# Patient Record
Sex: FEMALE | Race: BLACK OR AFRICAN AMERICAN | Employment: OTHER | URBAN - METROPOLITAN AREA
[De-identification: names, ages, dates, MRNs, and addresses within clinical notes are randomized per-mention and may not be internally consistent; named-entity substitution may affect disease eponyms.]

---

## 2021-11-13 ENCOUNTER — HOSPITAL ENCOUNTER (EMERGENCY)
Age: 71
Discharge: HOME | End: 2021-11-13
Payer: COMMERCIAL

## 2021-11-13 VITALS
DIASTOLIC BLOOD PRESSURE: 92 MMHG | WEIGHT: 127 LBS | HEART RATE: 69 BPM | HEIGHT: 61 IN | RESPIRATION RATE: 18 BRPM | OXYGEN SATURATION: 97 % | BODY MASS INDEX: 23.98 KG/M2 | SYSTOLIC BLOOD PRESSURE: 186 MMHG

## 2021-11-13 DIAGNOSIS — S05.8X2A ABRASION OF LEFT EYE, INITIAL ENCOUNTER: ICD-10-CM

## 2021-11-13 DIAGNOSIS — H11.32 CONJUNCTIVAL HEMORRHAGE OF LEFT EYE: Primary | ICD-10-CM

## 2021-11-13 DIAGNOSIS — S05.02XA ABRASION OF LEFT CORNEA, INITIAL ENCOUNTER: ICD-10-CM

## 2021-11-13 PROCEDURE — 99284 EMERGENCY DEPT VISIT MOD MDM: CPT

## 2021-11-13 PROCEDURE — 90471 IMMUNIZATION ADMIN: CPT | Performed by: NURSE PRACTITIONER

## 2021-11-13 PROCEDURE — 6370000000 HC RX 637 (ALT 250 FOR IP): Performed by: NURSE PRACTITIONER

## 2021-11-13 PROCEDURE — 90715 TDAP VACCINE 7 YRS/> IM: CPT | Performed by: NURSE PRACTITIONER

## 2021-11-13 PROCEDURE — 6360000002 HC RX W HCPCS: Performed by: NURSE PRACTITIONER

## 2021-11-13 RX ORDER — AMLODIPINE BESYLATE 10 MG/1
10 TABLET ORAL DAILY
COMMUNITY
Start: 2021-10-01

## 2021-11-13 RX ORDER — ASPIRIN 81 MG/1
81 TABLET ORAL DAILY
COMMUNITY
Start: 2021-10-01

## 2021-11-13 RX ORDER — OMEPRAZOLE 20 MG/1
20 CAPSULE, DELAYED RELEASE ORAL DAILY
COMMUNITY
Start: 2021-10-01

## 2021-11-13 RX ORDER — GENTAMICIN SULFATE 3 MG/ML
2 SOLUTION/ DROPS OPHTHALMIC ONCE
Status: COMPLETED | OUTPATIENT
Start: 2021-11-13 | End: 2021-11-13

## 2021-11-13 RX ORDER — VALSARTAN AND HYDROCHLOROTHIAZIDE 320; 25 MG/1; MG/1
1 TABLET, FILM COATED ORAL DAILY
COMMUNITY
Start: 2021-10-22

## 2021-11-13 RX ORDER — GENTAMICIN SULFATE 3 MG/ML
2 SOLUTION/ DROPS OPHTHALMIC 3 TIMES DAILY
Status: DISCONTINUED | OUTPATIENT
Start: 2021-11-13 | End: 2021-11-13

## 2021-11-13 RX ORDER — ATORVASTATIN CALCIUM 10 MG/1
10 TABLET, FILM COATED ORAL DAILY
COMMUNITY
Start: 2021-10-01

## 2021-11-13 RX ORDER — ATENOLOL 100 MG/1
100 TABLET ORAL DAILY
COMMUNITY
Start: 2021-10-01

## 2021-11-13 RX ADMIN — FLUORESCEIN SODIUM 1 MG: 1 STRIP OPHTHALMIC at 11:21

## 2021-11-13 RX ADMIN — GENTAMICIN SULFATE 2 DROP: 3 SOLUTION OPHTHALMIC at 11:40

## 2021-11-13 RX ADMIN — TETANUS TOXOID, REDUCED DIPHTHERIA TOXOID AND ACELLULAR PERTUSSIS VACCINE, ADSORBED 0.5 ML: 5; 2.5; 8; 8; 2.5 SUSPENSION INTRAMUSCULAR at 11:40

## 2021-11-13 ASSESSMENT — ENCOUNTER SYMPTOMS
BLURRED VISION: 1
EYE WATERING: 0
EYE ITCHING: 0
NAUSEA: 0
EYE DISCHARGE: 0
EYE REDNESS: 1
CRUSTING: 0
EYE INFLAMMATION: 1
DOUBLE VISION: 0
BLIND SPOTS: 0
PHOTOPHOBIA: 0
VOMITING: 0
PERI-ORBITAL EDEMA: 0

## 2021-11-13 ASSESSMENT — PAIN DESCRIPTION - PAIN TYPE: TYPE: ACUTE PAIN

## 2021-11-13 ASSESSMENT — PAIN DESCRIPTION - ORIENTATION: ORIENTATION: LEFT

## 2021-11-13 ASSESSMENT — PAIN DESCRIPTION - LOCATION: LOCATION: EYE

## 2021-11-13 ASSESSMENT — PAIN SCALES - GENERAL: PAINLEVEL_OUTOF10: 5

## 2021-11-13 NOTE — ED NOTES
Visual acuity completed and Radha np updated. Left eye 20/30, right eye 20/30.      Ilya Abbott RN  11/13/21 2547

## 2021-11-13 NOTE — ED PROVIDER NOTES
[address] [facility] ED  eMERGENCY dEPARTMENT eNCOUnter      Pt Name: Geetha Sims  MRN: [id number]  Magalygfirais 1950  Date of evaluation: 11/13/2021  Provider: FEMI Trevino 6626       Chief Complaint   Patient presents with    Eye Problem     contact with metal hook of [employer] decoration immediatly prior to arrival, left eye         HISTORY OF PRESENT ILLNESS   (Location/Symptom, Timing/Onset,Context/Setting, Quality, Duration, Modifying Factors, Severity)  Note limiting factors. Geetha Sims is a 66 y.o. female who presents to the emergency department      The history is provided by the patient. Eye Problem  Location:  Left eye  Quality:  Foreign body sensation  Severity:  Mild  Onset quality:  Sudden  Duration:  1 hour  Timing:  Constant  Progression:  Unchanged  Chronicity:  New  Context: direct trauma and scratch    Context: not burn, not chemical exposure, not contact lens problem, not foreign body, not using machinery, not smoke exposure and not UV exposure    Context comment:  Hiy in eye with garland that had a metal strip in it  Relieved by:  None tried  Worsened by:  Nothing  Ineffective treatments:  None tried  Associated symptoms: blurred vision, inflammation and redness    Associated symptoms: no crusting, no decreased vision, no discharge, no double vision, no facial rash, no headaches, no itching, no nausea, no numbness, no photophobia, no scotomas, no swelling, no tearing, no tingling, no vomiting and no weakness    Risk factors: conjunctival hemorrhage    Risk factors: no exposure to pinkeye, no previous injury to eye, no recent herpes zoster and no recent URI        NursingNotes were reviewed. REVIEW OF SYSTEMS    (2-9 systems for level 4, 10 or more for level 5)     Review of Systems   Eyes: Positive for blurred vision and redness. Negative for double vision, photophobia, discharge and itching. Gastrointestinal: Negative for nausea and vomiting. Neurological: Negative for tingling, weakness, numbness and headaches. Except as noted above the remainder of the review of systems was reviewed and negative. PAST MEDICAL HISTORY     Past Medical History:   Diagnosis Date    Hyperlipidemia     Hypertension          SURGICALHISTORY     History reviewed. No pertinent surgical history. CURRENT MEDICATIONS       Previous Medications    AMLODIPINE (NORVASC) 10 MG TABLET    Take 10 mg by mouth daily    ASPIRIN 81 MG EC TABLET    Take 81 mg by mouth daily    ATENOLOL (TENORMIN) 100 MG TABLET    Take 100 mg by mouth daily    ATORVASTATIN (LIPITOR) 10 MG TABLET    Take 10 mg by mouth daily    CHOLECALCIFEROL 50 MCG (2000 UT) TABS    Take 2,000 Units by mouth daily    OMEPRAZOLE (PRILOSEC) 20 MG DELAYED RELEASE CAPSULE    Take 20 mg by mouth daily    VALSARTAN-HYDROCHLOROTHIAZIDE (DIOVAN-HCT) 320-25 MG PER TABLET    Take 1 tablet by mouth daily       ALLERGIES     Hydralazine, Lisinopril, and Morphine    FAMILY HISTORY     History reviewed. No pertinent family history. SOCIAL HISTORY       Social History     Socioeconomic History    Marital status: Single     Spouse name: None    Number of children: None    Years of education: None    Highest education level: None   Occupational History    None   Tobacco Use    Smoking status: Never Smoker    Smokeless tobacco: Never Used   Substance and Sexual Activity    Alcohol use: Never    Drug use: Never    Sexual activity: None   Other Topics Concern    None   Social History Narrative    None     Social Determinants of Health     Financial Resource Strain:     Difficulty of Paying Living Expenses: Not on file   Food Insecurity:     Worried About Running Out of Food in the Last Year: Not on file    Jovan of Food in the Last Year: Not on file   Transportation Needs:     Lack of Transportation (Medical): Not on file    Lack of Transportation (Non-Medical):  Not on file   Physical Activity: physician with the below findings:        Interpretation per the Radiologist below, if available at the time ofthis note:    No orders to display         ED BEDSIDE ULTRASOUND:   Performed by ED Physician - none    LABS:  Labs Reviewed - No data to display    All other labs were within normal range or not returned as of this dictation. EMERGENCY DEPARTMENT COURSE and DIFFERENTIAL DIAGNOSIS/MDM:   Vitals:    Vitals:    11/13/21 1045   BP: (!) 186/92   Pulse: 69   Resp: 18   SpO2: 97%   Weight: 127 lb (57.6 kg)   Height: 5' 1\" (1.549 m)           MDM    CRITICAL CARE TIME   Total Critical Care time was  minutes, excluding separately reportableprocedures. There was a high probability of clinicallysignificant/life threatening deterioration in the patient's condition which required my urgent intervention. CONSULTS:  None    PROCEDURES:  Unless otherwise noted below, none     Procedures    FINAL IMPRESSION      1. Conjunctival hemorrhage of left eye    2. Abrasion of left cornea, initial encounter    3.  Abrasion of left eye, initial encounter        DISPOSITION/PLAN   DISPOSITION Decision To Discharge 11/13/2021 11:32:02 AM      PATIENT REFERRED TO:  [address]   [phone]  Schedule an appointment as soon as possible for a visit in 1 day  call MOnday morning ask for same day appointment      DISCHARGE MEDICATIONS:  New Prescriptions    No medications on file          (Please note that portions of this note were completed with a voice recognitionprogram.  Efforts were made to edit the dictations but occasionally words are mis-transcribed.)    FEMI Griffith CNP (electronically signed)  Attending Emergency Physician         FEMI Jeter CNP  11/13/21 3631

## 2021-11-13 NOTE — ED TRIAGE NOTES
PT to ed from home via triage with complaints of eye injury  Pt states the was hit in the eye by a elías ornament. Pt reports metal hook end hit left eye. Subconjunctival hemorrhage noted to lateral left eye. No bleeding drainage or discharge noted. Pt reports vision is intact with mild blurring noted.

## 2024-07-05 ENCOUNTER — HOSPITAL ENCOUNTER (OUTPATIENT)
Facility: HOSPITAL | Age: 74
Setting detail: OBSERVATION
Discharge: HOME | End: 2024-07-07
Attending: STUDENT IN AN ORGANIZED HEALTH CARE EDUCATION/TRAINING PROGRAM | Admitting: INTERNAL MEDICINE
Payer: COMMERCIAL

## 2024-07-05 ENCOUNTER — APPOINTMENT (OUTPATIENT)
Dept: RADIOLOGY | Facility: HOSPITAL | Age: 74
End: 2024-07-05
Payer: COMMERCIAL

## 2024-07-05 ENCOUNTER — APPOINTMENT (OUTPATIENT)
Dept: CARDIOLOGY | Facility: HOSPITAL | Age: 74
End: 2024-07-05
Payer: COMMERCIAL

## 2024-07-05 DIAGNOSIS — G45.8 OTHER TRANSIENT CEREBRAL ISCHEMIC ATTACKS AND RELATED SYNDROMES: ICD-10-CM

## 2024-07-05 DIAGNOSIS — R06.02 SHORTNESS OF BREATH: ICD-10-CM

## 2024-07-05 DIAGNOSIS — R51.9 NONINTRACTABLE HEADACHE, UNSPECIFIED CHRONICITY PATTERN, UNSPECIFIED HEADACHE TYPE: Primary | ICD-10-CM

## 2024-07-05 DIAGNOSIS — G45.9 TIA (TRANSIENT ISCHEMIC ATTACK): ICD-10-CM

## 2024-07-05 LAB
ALBUMIN SERPL BCP-MCNC: 4.3 G/DL (ref 3.4–5)
ALP SERPL-CCNC: 74 U/L (ref 33–136)
ALT SERPL W P-5'-P-CCNC: 14 U/L (ref 7–45)
ANION GAP SERPL CALC-SCNC: 13 MMOL/L (ref 10–20)
APTT PPP: 31 SECONDS (ref 27–38)
AST SERPL W P-5'-P-CCNC: 17 U/L (ref 9–39)
BASOPHILS # BLD AUTO: 0.04 X10*3/UL (ref 0–0.1)
BASOPHILS NFR BLD AUTO: 0.7 %
BILIRUB SERPL-MCNC: 0.4 MG/DL (ref 0–1.2)
BNP SERPL-MCNC: 19 PG/ML (ref 0–99)
BUN SERPL-MCNC: 25 MG/DL (ref 6–23)
CALCIUM SERPL-MCNC: 9.4 MG/DL (ref 8.6–10.3)
CARDIAC TROPONIN I PNL SERPL HS: 4 NG/L (ref 0–13)
CARDIAC TROPONIN I PNL SERPL HS: 5 NG/L (ref 0–13)
CHLORIDE SERPL-SCNC: 104 MMOL/L (ref 98–107)
CHOLEST SERPL-MCNC: 159 MG/DL (ref 0–199)
CHOLESTEROL/HDL RATIO: 3.8
CO2 SERPL-SCNC: 26 MMOL/L (ref 21–32)
CREAT SERPL-MCNC: 1.03 MG/DL (ref 0.5–1.05)
EGFRCR SERPLBLD CKD-EPI 2021: 57 ML/MIN/1.73M*2
EOSINOPHIL # BLD AUTO: 0.15 X10*3/UL (ref 0–0.4)
EOSINOPHIL NFR BLD AUTO: 2.6 %
ERYTHROCYTE [DISTWIDTH] IN BLOOD BY AUTOMATED COUNT: 13.2 % (ref 11.5–14.5)
GLUCOSE BLD MANUAL STRIP-MCNC: 136 MG/DL (ref 74–99)
GLUCOSE SERPL-MCNC: 100 MG/DL (ref 74–99)
HCT VFR BLD AUTO: 39.4 % (ref 36–46)
HDLC SERPL-MCNC: 42.1 MG/DL
HGB BLD-MCNC: 12.8 G/DL (ref 12–16)
HOLD SPECIMEN: NORMAL
IMM GRANULOCYTES # BLD AUTO: 0.01 X10*3/UL (ref 0–0.5)
IMM GRANULOCYTES NFR BLD AUTO: 0.2 % (ref 0–0.9)
INR PPP: 1 (ref 0.9–1.1)
LDLC SERPL CALC-MCNC: 85 MG/DL
LYMPHOCYTES # BLD AUTO: 2.71 X10*3/UL (ref 0.8–3)
LYMPHOCYTES NFR BLD AUTO: 47.5 %
MAGNESIUM SERPL-MCNC: 2.41 MG/DL (ref 1.6–2.4)
MCH RBC QN AUTO: 28.5 PG (ref 26–34)
MCHC RBC AUTO-ENTMCNC: 32.5 G/DL (ref 32–36)
MCV RBC AUTO: 88 FL (ref 80–100)
MONOCYTES # BLD AUTO: 0.62 X10*3/UL (ref 0.05–0.8)
MONOCYTES NFR BLD AUTO: 10.9 %
NEUTROPHILS # BLD AUTO: 2.18 X10*3/UL (ref 1.6–5.5)
NEUTROPHILS NFR BLD AUTO: 38.1 %
NON HDL CHOLESTEROL: 117 MG/DL (ref 0–149)
NRBC BLD-RTO: 0 /100 WBCS (ref 0–0)
PLATELET # BLD AUTO: 268 X10*3/UL (ref 150–450)
POTASSIUM SERPL-SCNC: 4.5 MMOL/L (ref 3.5–5.3)
PROT SERPL-MCNC: 7.5 G/DL (ref 6.4–8.2)
PROTHROMBIN TIME: 10.7 SECONDS (ref 9.8–12.8)
RBC # BLD AUTO: 4.49 X10*6/UL (ref 4–5.2)
SODIUM SERPL-SCNC: 138 MMOL/L (ref 136–145)
TRIGL SERPL-MCNC: 158 MG/DL (ref 0–149)
VLDL: 32 MG/DL (ref 0–40)
WBC # BLD AUTO: 5.7 X10*3/UL (ref 4.4–11.3)

## 2024-07-05 PROCEDURE — 36415 COLL VENOUS BLD VENIPUNCTURE: CPT | Performed by: INTERNAL MEDICINE

## 2024-07-05 PROCEDURE — 96361 HYDRATE IV INFUSION ADD-ON: CPT

## 2024-07-05 PROCEDURE — 80061 LIPID PANEL: CPT | Performed by: INTERNAL MEDICINE

## 2024-07-05 PROCEDURE — 85610 PROTHROMBIN TIME: CPT | Performed by: STUDENT IN AN ORGANIZED HEALTH CARE EDUCATION/TRAINING PROGRAM

## 2024-07-05 PROCEDURE — 80053 COMPREHEN METABOLIC PANEL: CPT | Performed by: STUDENT IN AN ORGANIZED HEALTH CARE EDUCATION/TRAINING PROGRAM

## 2024-07-05 PROCEDURE — 70551 MRI BRAIN STEM W/O DYE: CPT

## 2024-07-05 PROCEDURE — 93005 ELECTROCARDIOGRAM TRACING: CPT

## 2024-07-05 PROCEDURE — 71045 X-RAY EXAM CHEST 1 VIEW: CPT

## 2024-07-05 PROCEDURE — 70450 CT HEAD/BRAIN W/O DYE: CPT | Mod: RCN

## 2024-07-05 PROCEDURE — 85730 THROMBOPLASTIN TIME PARTIAL: CPT | Performed by: STUDENT IN AN ORGANIZED HEALTH CARE EDUCATION/TRAINING PROGRAM

## 2024-07-05 PROCEDURE — 85025 COMPLETE CBC W/AUTO DIFF WBC: CPT | Performed by: STUDENT IN AN ORGANIZED HEALTH CARE EDUCATION/TRAINING PROGRAM

## 2024-07-05 PROCEDURE — G0378 HOSPITAL OBSERVATION PER HR: HCPCS

## 2024-07-05 PROCEDURE — 70498 CT ANGIOGRAPHY NECK: CPT | Performed by: RADIOLOGY

## 2024-07-05 PROCEDURE — 97165 OT EVAL LOW COMPLEX 30 MIN: CPT | Mod: GO

## 2024-07-05 PROCEDURE — 96374 THER/PROPH/DIAG INJ IV PUSH: CPT | Mod: 59

## 2024-07-05 PROCEDURE — 83880 ASSAY OF NATRIURETIC PEPTIDE: CPT | Performed by: INTERNAL MEDICINE

## 2024-07-05 PROCEDURE — 70551 MRI BRAIN STEM W/O DYE: CPT | Performed by: RADIOLOGY

## 2024-07-05 PROCEDURE — 70496 CT ANGIOGRAPHY HEAD: CPT | Performed by: RADIOLOGY

## 2024-07-05 PROCEDURE — 83036 HEMOGLOBIN GLYCOSYLATED A1C: CPT | Mod: ELYLAB | Performed by: INTERNAL MEDICINE

## 2024-07-05 PROCEDURE — 2550000001 HC RX 255 CONTRASTS: Performed by: STUDENT IN AN ORGANIZED HEALTH CARE EDUCATION/TRAINING PROGRAM

## 2024-07-05 PROCEDURE — 82947 ASSAY GLUCOSE BLOOD QUANT: CPT

## 2024-07-05 PROCEDURE — 2500000004 HC RX 250 GENERAL PHARMACY W/ HCPCS (ALT 636 FOR OP/ED): Performed by: STUDENT IN AN ORGANIZED HEALTH CARE EDUCATION/TRAINING PROGRAM

## 2024-07-05 PROCEDURE — 2500000001 HC RX 250 WO HCPCS SELF ADMINISTERED DRUGS (ALT 637 FOR MEDICARE OP): Performed by: STUDENT IN AN ORGANIZED HEALTH CARE EDUCATION/TRAINING PROGRAM

## 2024-07-05 PROCEDURE — 84484 ASSAY OF TROPONIN QUANT: CPT | Performed by: STUDENT IN AN ORGANIZED HEALTH CARE EDUCATION/TRAINING PROGRAM

## 2024-07-05 PROCEDURE — 96375 TX/PRO/DX INJ NEW DRUG ADDON: CPT | Mod: 59

## 2024-07-05 PROCEDURE — 71045 X-RAY EXAM CHEST 1 VIEW: CPT | Mod: FOREIGN READ | Performed by: RADIOLOGY

## 2024-07-05 PROCEDURE — 97161 PT EVAL LOW COMPLEX 20 MIN: CPT | Mod: GP | Performed by: PHYSICAL THERAPIST

## 2024-07-05 PROCEDURE — 83735 ASSAY OF MAGNESIUM: CPT | Performed by: STUDENT IN AN ORGANIZED HEALTH CARE EDUCATION/TRAINING PROGRAM

## 2024-07-05 PROCEDURE — 99223 1ST HOSP IP/OBS HIGH 75: CPT | Performed by: INTERNAL MEDICINE

## 2024-07-05 PROCEDURE — 99285 EMERGENCY DEPT VISIT HI MDM: CPT

## 2024-07-05 PROCEDURE — 2500000001 HC RX 250 WO HCPCS SELF ADMINISTERED DRUGS (ALT 637 FOR MEDICARE OP): Performed by: INTERNAL MEDICINE

## 2024-07-05 PROCEDURE — 36415 COLL VENOUS BLD VENIPUNCTURE: CPT | Performed by: STUDENT IN AN ORGANIZED HEALTH CARE EDUCATION/TRAINING PROGRAM

## 2024-07-05 PROCEDURE — 70498 CT ANGIOGRAPHY NECK: CPT

## 2024-07-05 RX ORDER — ASPIRIN 81 MG/1
81 TABLET ORAL DAILY
Status: DISCONTINUED | OUTPATIENT
Start: 2024-07-06 | End: 2024-07-07 | Stop reason: HOSPADM

## 2024-07-05 RX ORDER — DICLOFENAC SODIUM 10 MG/G
4 GEL TOPICAL 4 TIMES DAILY PRN
COMMUNITY

## 2024-07-05 RX ORDER — ASPIRIN 81 MG/1
81 TABLET ORAL DAILY
COMMUNITY

## 2024-07-05 RX ORDER — AMLODIPINE BESYLATE 10 MG/1
10 TABLET ORAL DAILY
COMMUNITY

## 2024-07-05 RX ORDER — CLOPIDOGREL BISULFATE 300 MG/1
300 TABLET, FILM COATED ORAL ONCE
Status: COMPLETED | OUTPATIENT
Start: 2024-07-05 | End: 2024-07-05

## 2024-07-05 RX ORDER — POLYETHYLENE GLYCOL 3350 17 G/17G
17 POWDER, FOR SOLUTION ORAL DAILY
Status: DISCONTINUED | OUTPATIENT
Start: 2024-07-05 | End: 2024-07-07 | Stop reason: HOSPADM

## 2024-07-05 RX ORDER — METOCLOPRAMIDE HYDROCHLORIDE 5 MG/ML
10 INJECTION INTRAMUSCULAR; INTRAVENOUS ONCE
Status: COMPLETED | OUTPATIENT
Start: 2024-07-05 | End: 2024-07-05

## 2024-07-05 RX ORDER — ASPIRIN 325 MG
50000 TABLET, DELAYED RELEASE (ENTERIC COATED) ORAL
COMMUNITY

## 2024-07-05 RX ORDER — CLOPIDOGREL BISULFATE 75 MG/1
75 TABLET ORAL DAILY
Status: DISCONTINUED | OUTPATIENT
Start: 2024-07-06 | End: 2024-07-07 | Stop reason: HOSPADM

## 2024-07-05 RX ORDER — ATORVASTATIN CALCIUM 10 MG/1
10 TABLET, FILM COATED ORAL DAILY
COMMUNITY
End: 2024-07-07 | Stop reason: HOSPADM

## 2024-07-05 RX ORDER — ATENOLOL 100 MG/1
100 TABLET ORAL DAILY
COMMUNITY

## 2024-07-05 RX ORDER — DIPHENHYDRAMINE HYDROCHLORIDE 50 MG/ML
25 INJECTION INTRAMUSCULAR; INTRAVENOUS ONCE
Status: COMPLETED | OUTPATIENT
Start: 2024-07-05 | End: 2024-07-05

## 2024-07-05 RX ORDER — ATORVASTATIN CALCIUM 20 MG/1
40 TABLET, FILM COATED ORAL NIGHTLY
Status: DISCONTINUED | OUTPATIENT
Start: 2024-07-05 | End: 2024-07-07 | Stop reason: HOSPADM

## 2024-07-05 RX ORDER — LABETALOL HYDROCHLORIDE 5 MG/ML
10 INJECTION, SOLUTION INTRAVENOUS EVERY 10 MIN PRN
Status: ACTIVE | OUTPATIENT
Start: 2024-07-05 | End: 2024-07-07

## 2024-07-05 RX ORDER — ASPIRIN 325 MG
325 TABLET ORAL ONCE
Status: COMPLETED | OUTPATIENT
Start: 2024-07-05 | End: 2024-07-05

## 2024-07-05 RX ORDER — LOSARTAN POTASSIUM AND HYDROCHLOROTHIAZIDE 25; 100 MG/1; MG/1
1 TABLET ORAL DAILY
COMMUNITY

## 2024-07-05 SDOH — SOCIAL STABILITY: SOCIAL INSECURITY: HAS ANYONE EVER THREATENED TO HURT YOUR FAMILY OR YOUR PETS?: NO

## 2024-07-05 SDOH — SOCIAL STABILITY: SOCIAL INSECURITY: ABUSE: ADULT

## 2024-07-05 SDOH — SOCIAL STABILITY: SOCIAL INSECURITY: DO YOU FEEL UNSAFE GOING BACK TO THE PLACE WHERE YOU ARE LIVING?: NO

## 2024-07-05 SDOH — SOCIAL STABILITY: SOCIAL INSECURITY: DO YOU FEEL ANYONE HAS EXPLOITED OR TAKEN ADVANTAGE OF YOU FINANCIALLY OR OF YOUR PERSONAL PROPERTY?: NO

## 2024-07-05 SDOH — SOCIAL STABILITY: SOCIAL INSECURITY: HAVE YOU HAD THOUGHTS OF HARMING ANYONE ELSE?: NO

## 2024-07-05 SDOH — SOCIAL STABILITY: SOCIAL INSECURITY: ARE THERE ANY APPARENT SIGNS OF INJURIES/BEHAVIORS THAT COULD BE RELATED TO ABUSE/NEGLECT?: NO

## 2024-07-05 SDOH — SOCIAL STABILITY: SOCIAL INSECURITY: HAVE YOU HAD ANY THOUGHTS OF HARMING ANYONE ELSE?: NO

## 2024-07-05 SDOH — SOCIAL STABILITY: SOCIAL INSECURITY: ARE YOU OR HAVE YOU BEEN THREATENED OR ABUSED PHYSICALLY, EMOTIONALLY, OR SEXUALLY BY ANYONE?: NO

## 2024-07-05 SDOH — SOCIAL STABILITY: SOCIAL INSECURITY: DOES ANYONE TRY TO KEEP YOU FROM HAVING/CONTACTING OTHER FRIENDS OR DOING THINGS OUTSIDE YOUR HOME?: NO

## 2024-07-05 ASSESSMENT — COGNITIVE AND FUNCTIONAL STATUS - GENERAL
DAILY ACTIVITIY SCORE: 24
DAILY ACTIVITIY SCORE: 24
MOBILITY SCORE: 24
PATIENT BASELINE BEDBOUND: NO
MOBILITY SCORE: 24

## 2024-07-05 ASSESSMENT — ENCOUNTER SYMPTOMS
CONSTITUTIONAL NEGATIVE: 1
ENDOCRINE NEGATIVE: 1
RESPIRATORY NEGATIVE: 1
MUSCULOSKELETAL NEGATIVE: 1
EYES NEGATIVE: 1
CARDIOVASCULAR NEGATIVE: 1
GASTROINTESTINAL NEGATIVE: 1
NEUROLOGICAL NEGATIVE: 1

## 2024-07-05 ASSESSMENT — LIFESTYLE VARIABLES
HOW MANY STANDARD DRINKS CONTAINING ALCOHOL DO YOU HAVE ON A TYPICAL DAY: PATIENT DOES NOT DRINK
HOW OFTEN DO YOU HAVE A DRINK CONTAINING ALCOHOL: NEVER
AUDIT-C TOTAL SCORE: 0
TOTAL SCORE: 0
SKIP TO QUESTIONS 9-10: 1
EVER FELT BAD OR GUILTY ABOUT YOUR DRINKING: NO
AUDIT-C TOTAL SCORE: 0
HAVE PEOPLE ANNOYED YOU BY CRITICIZING YOUR DRINKING: NO
HOW OFTEN DO YOU HAVE 6 OR MORE DRINKS ON ONE OCCASION: NEVER
EVER HAD A DRINK FIRST THING IN THE MORNING TO STEADY YOUR NERVES TO GET RID OF A HANGOVER: NO
HAVE YOU EVER FELT YOU SHOULD CUT DOWN ON YOUR DRINKING: NO

## 2024-07-05 ASSESSMENT — COLUMBIA-SUICIDE SEVERITY RATING SCALE - C-SSRS
6. HAVE YOU EVER DONE ANYTHING, STARTED TO DO ANYTHING, OR PREPARED TO DO ANYTHING TO END YOUR LIFE?: NO
1. IN THE PAST MONTH, HAVE YOU WISHED YOU WERE DEAD OR WISHED YOU COULD GO TO SLEEP AND NOT WAKE UP?: NO
2. HAVE YOU ACTUALLY HAD ANY THOUGHTS OF KILLING YOURSELF?: NO

## 2024-07-05 ASSESSMENT — PAIN SCALES - GENERAL
PAINLEVEL_OUTOF10: 0 - NO PAIN
PAINLEVEL_OUTOF10: 0 - NO PAIN
PAINLEVEL_OUTOF10: 8
PAINLEVEL_OUTOF10: 0 - NO PAIN
PAINLEVEL_OUTOF10: 4

## 2024-07-05 ASSESSMENT — PATIENT HEALTH QUESTIONNAIRE - PHQ9
2. FEELING DOWN, DEPRESSED OR HOPELESS: NOT AT ALL
1. LITTLE INTEREST OR PLEASURE IN DOING THINGS: NOT AT ALL
SUM OF ALL RESPONSES TO PHQ9 QUESTIONS 1 & 2: 0

## 2024-07-05 ASSESSMENT — PAIN DESCRIPTION - ORIENTATION: ORIENTATION: RIGHT

## 2024-07-05 ASSESSMENT — ACTIVITIES OF DAILY LIVING (ADL)
WALKS IN HOME: INDEPENDENT
FEEDING YOURSELF: INDEPENDENT
TOILETING: INDEPENDENT
BATHING_ASSISTANCE: INDEPENDENT
DRESSING YOURSELF: INDEPENDENT
ADEQUATE_TO_COMPLETE_ADL: YES
JUDGMENT_ADEQUATE_SAFELY_COMPLETE_DAILY_ACTIVITIES: YES
GROOMING: INDEPENDENT
BATHING: INDEPENDENT
PATIENT'S MEMORY ADEQUATE TO SAFELY COMPLETE DAILY ACTIVITIES?: YES
LACK_OF_TRANSPORTATION: NO
HEARING - RIGHT EAR: FUNCTIONAL
HEARING - LEFT EAR: FUNCTIONAL

## 2024-07-05 ASSESSMENT — PAIN - FUNCTIONAL ASSESSMENT
PAIN_FUNCTIONAL_ASSESSMENT: 0-10

## 2024-07-05 ASSESSMENT — PAIN DESCRIPTION - LOCATION: LOCATION: HEAD

## 2024-07-05 ASSESSMENT — PAIN DESCRIPTION - PAIN TYPE: TYPE: ACUTE PAIN

## 2024-07-05 ASSESSMENT — PAIN DESCRIPTION - DESCRIPTORS: DESCRIPTORS: ACHING

## 2024-07-05 NOTE — H&P
History Of Present Illness  Moon Martinez is a 74 y.o. female with a significant past medical history of hypertension hyperlipidemia came to the emergency room complaining of facial droop, word finding difficulty slurred speech and right upper extremity weakness and numbness, by the time patient reached the emergency room symptoms resolved, denies any chest pain shortness of breath or palpitations, denies any abdominal pain nausea or vomitings, denies any weakness.  Concern for TIA, CT head ruled out intracranial bleed, CTA head and neck no evidence of significant arterial occlusion except moderate stenosis involving the right MCA distal M1 segment.  ER physician contacted both neurology and stroke team at [facility].  No intervention was recommended, continue medical treatment with the dual antiplatelet therapy and admitting patient for further stroke workup.     Past Medical History  She has a past medical history of Personal history of other diseases of the circulatory system and Personal history of other endocrine, nutritional and metabolic disease.  Hypertension.  Hyperlipidemia  Surgical History  She has a past surgical history that includes Lithotripsy (03/24/2015); Other surgical history (03/24/2015); and Other surgical history (03/24/2015).     Social History  She has no history on file for tobacco use, alcohol use, and drug use.    Family History  No family history on file.  Father has history of hypertension  Allergies  Hydralazine, Hydrochlorothiazide, Lisinopril, Milk, and Morphine    Review of Systems   Constitutional: Negative.    HENT: Negative.     Eyes: Negative.    Respiratory: Negative.     Cardiovascular: Negative.    Gastrointestinal: Negative.    Endocrine: Negative.    Genitourinary: Negative.    Musculoskeletal: Negative.    Skin: Negative.    Neurological: Negative.       Review of Systems   Constitutional: Negative.   HENT: Negative.     Eyes: Negative.    Cardiovascular:  Negative.    Respiratory: Negative.     Endocrine: Negative.    Skin: Negative.    Musculoskeletal: Negative.    Gastrointestinal: Negative.    Genitourinary: Negative.    Neurological: Negative.       Physical Exam  General: No distress  Neck: Supple.  HEENT: Normocephalic atraumatic pupils equal react light  Heart: S1-S2 heard no murmurs gallops regurgitation  Lungs: Clear to auscultation bilaterally no wheezing.  Abdomen: Nondistended nontender bowel sounds are present  Neuro: No focal deficits  Last Recorded Vitals  /71   Pulse 70   Temp 36.6 °C (97.9 °F)   Resp 16   Wt 57.6 kg (127 lb)   SpO2 94%     Relevant Results  CT angio head and neck w and wo IV contrast    Result Date: 7/5/2024  Interpreted By:  Jimenez Martinez, STUDY: CT ANGIO HEAD AND NECK W AND WO IV CONTRAST performed 7/5/2024   INDICATION: Signs/Symptoms:HA, c/f dissection   COMPARISON: CT head dated 07/05/2024.   ACCESSION NUMBER(S): [id number]   ORDERING CLINICIAN: MATHEW CULLEN   TECHNIQUE: Axial CTA imaging was obtained through the head and neck following the administration of 75 ML Omnipaque 350 intravenous contrast. Coronal, sagittal, MIP, and 3D reconstructions were obtained. 3D reconstructions were rendered using a separate 3D workstation.   Motion artifact degrades assessment.   FINDINGS: NECK:   No suspicious lymphadenopathy. Patent airway. Salivary glands are unremarkable. Multinodular thyroid gland with dominant 2.0 cm nodule involving the inferior aspect of the left lobe of the thyroid gland. No definite acute osseous abnormality of the cervical spine. Moderate to advanced cervical spondylosis. Mild biapical pleuroparenchymal scarring.   VASCULAR FINDINGS:   Aorta and subclavian arteries:Brachiocephalic and left common carotid artery share a common origin, an anatomic variant. Subclavian arteries are patent without flow significant stenosis.   Common carotid arteries: Allowing for motion artifact, patent bilaterally without  focal stenosis.   External carotid arteries: Patent bilaterally.   Internal carotid arteries: Patent bilaterally. There is small calcified atherosclerotic plaque of the left carotid bifurcation/left internal carotid artery origin. There is no NASCET stenosis bilaterally. There is also trace atherosclerotic involvement of the parasellar intracranial internal carotid artery segments bilaterally without stenosis or definite aneurysm.   Anterior cerebral arteries: Right A1 segment is hypoplastic versus absent. Otherwise expected enhancement of the ACAs bilaterally.   Middle cerebral arteries: Patent appearing bilaterally. There is moderate stenosis involving the right MCA distal M1 and extending to the M2 segment origins (series 401, image 513 and series 404, image 84, for example). The left MCA appears patent without proximal stenosis.   Vertebral arteries: Patent flow signal bilaterally. Mild tortuosity of the V1 segments bilaterally. Otherwise normal enhancement.   Basilar artery: Normal.   Posterior communicating arteries: Visualized on the left, not well visualized on the right.   Posterior cerebral arteries: Near fetal origin of the left PCA. Normal bilaterally.       Motion artifact mildly degrades assessment. No evidence of source vessel arterial occlusion within the head and neck.   There is concern for moderate stenosis involving the right MCA distal M1 segment extending to the proximal M2 branches. Suspected hypoplastic versus absent right A1 segment. Otherwise no additional flow significant stenosis within the head and neck identified.   No evidence of dissection. No NASCET stenosis of the internal carotid artery origins.   2.0 cm diameter nodule suggested within the inferior aspect of the left lobe of the thyroid gland; recommend nonemergent ultrasound for further characterization.   MACRO: Incidental Finding:  There are few small hypoattenuating nodules measuring equal to or greater than 1.5 cm in the  thyroid gland. (**-YCF-**)   Instructions:  Further evaluation with nonemergent thyroid ultrasound. (Managing Incidental Thyroid Nodules Detected on Imaging: White Paper of the ACR Incidental Thyroid Findings Committee. Ora Haines et al. Journal of the American College of Radiology,Volume 12, Issue 2, 143  150.) [email or web address]   Signed by: Jimenez Martinez 7/5/2024 10:04 AM Dictation workstation:   [id number]    ECG 12 lead    Result Date: 7/5/2024  Normal sinus rhythm Possible Left atrial enlargement Left ventricular hypertrophy Cannot rule out Septal infarct (cited on or before 05-JUL-2024) ST & Marked T wave abnormality, consider lateral ischemia Abnormal ECG When compared with ECG of 05-JUL-2024 07:51, (unconfirmed) T wave inversion no longer evident in Inferior leads See ED provider note for full interpretation and clinical correlation    XR chest 1 view    Result Date: 7/5/2024  STUDY: Chest Radiograph;  7/5/2024 7:18AM INDICATION: Chest pain. COMPARISON: None Available. ACCESSION NUMBER(S): [id number] ORDERING CLINICIAN: MATHEW CULLEN TECHNIQUE:  Frontal chest was obtained at 8:18 hours. FINDINGS: CARDIOMEDIASTINAL SILHOUETTE: Cardiomediastinal silhouette is normal in size and configuration.  LUNGS: Lungs are clear.  ABDOMEN: No remarkable upper abdominal findings.  BONES: No acute osseous changes.    No radiographic evidence of acute cardiopulmonary disease. Signed by Raphael Nelson MD    CT head wo IV contrast    Result Date: 7/5/2024  Interpreted By:  Nicole Kapoor, STUDY: CT HEAD WO IV CONTRAST;  7/5/2024 7:48 am   INDICATION: Signs/Symptoms:R side headache, transient slurred speech.   COMPARISON: 01/20/2015   ACCESSION NUMBER(S): [id number]   ORDERING CLINICIAN: MATHEW CULLEN   TECHNIQUE: Noncontrast axial CT scan of head was performed. Angled reformats in brain and bone windows and coronal and sagittal reformats in brain window were generated.   FINDINGS: CSF Spaces: The ventricles, sulci  and basal cisterns are within normal limits. There is no extraaxial fluid collection.   Parenchyma:  The grey-white differentiation is intact. There is no mass effect or midline shift.  There is no intracranial hemorrhage.   Calvarium: The calvarium is unremarkable.   Paranasal sinuses and mastoids: Mild mucoperiosteal thickening is noted in ethmoid air cells. Paranasal sinuses and mastoid air cells are otherwise clear.       No evidence of acute cortical infarct or intracranial hemorrhage.   No evidence of intracranial hemorrhage or displaced skull fracture.   MACRO: None.   Signed by: Nicole Kapoor 7/5/2024 8:20 AM Dictation workstation:   [id number]    ECG 12 lead    Result Date: 7/5/2024  Normal sinus rhythm Possible Left atrial enlargement Left ventricular hypertrophy Cannot rule out Septal infarct (cited on or before 05-JUL-2024) ST & Marked T wave abnormality, consider inferolateral ischemia Abnormal ECG When compared with ECG of 15-JUL-2022 13:25, Previous ECG has undetermined rhythm, needs review Serial changes of Septal infarct Present See ED provider note for full interpretation and clinical correlation   Scheduled medications  [START ON 7/6/2024] aspirin, 81 mg, oral, Daily  aspirin, 325 mg, oral, Once  atorvastatin, 40 mg, oral, Nightly  clopidogrel, 300 mg, oral, Once  [START ON 7/6/2024] clopidogrel, 75 mg, oral, Daily  oxygen, , inhalation, Continuous - 02/gases  perflutren lipid microspheres, 0.5-10 mL of dilution, intravenous, Once in imaging  perflutren protein A microsphere, 0.5 mL, intravenous, Once in imaging  polyethylene glycol, 17 g, oral, Daily  sulfur hexafluoride microsphr, 2 mL, intravenous, Once in imaging      Continuous medications     PRN medications  PRN medications: labetaloL       Assessment/Plan   TIA.  Hypertension  T wave changes on the EKG.  Narrowing of right MCA.    History of:  Hypertension  Hyperlipidemia.    Plan:  Patient initially presented to the emergency room  with right upper extremity weakness, word finding difficulty and slurred speech-symptoms resolved by the time patient reached the emergency room.  Concern for TIA, initial workup is unremarkable except mild to moderate stenosis of right MCA.  ER physician contacted both neurology and stroke neurology service at [facility], discussed with Dr. Lonnie Hanson-recommended admitted locally for TIA workup.  As the patient's ABCD2 score is 4-starting patient on dual antiplatelet therapy.  With aspirin and Plavix, statins nightly.  MRI brain ordered.  Echocardiogram.  Neurochecks.  Gentle hydration.  EKG results reviewed, showing a significant T wave depressions in the chest leads, consulted cardiology, cycling troponins.  Lipid panel for tomorrow.  Blood pressure initially was high in 170s improved to 140s on its own, stable.  Currently n.p.o., speech evaluation, after clearance okay for regular diet.    DVT prophylaxis:  Lovenox daily.    Disposition:  Stroke workup and neurology evaluation pending.  Possible discharge in next 1 to 2 days.    Wilfred Henderson MD

## 2024-07-05 NOTE — PROGRESS NOTES
Physical Therapy    Physical Therapy Evaluation    Patient Name: Moon Martinez  MRN: [id number]  Today's Date: 7/5/2024   Time Calculation  Start Time: 1501  Stop Time: 1513  Time Calculation (min): 12 min  909/909-B    Assessment/Plan   PT Assessment  Rehab Prognosis: Good  Evaluation/Treatment Tolerance: Patient tolerated treatment well  Medical Staff Made Aware: Yes  Strengths: Ability to acquire knowledge, Access to adaptive/assistive products, Attitude of self, Capable of completing ADLs semi/independent, Coping skills, Insight into problems, Housing layout, Living arrangement secure, Premorbid level of function  End of Session Communication: Bedside nurse  End of Session Patient Position: Bed, 2 rail up, Alarm off, not on at start of session (Call light within reach)  IP OR SWING BED PT PLAN  Inpatient or Swing Bed: Inpatient  PT Plan  PT Plan: PT Eval only  PT Eval Only Reason: No acute PT needs identified  PT Frequency: PT eval only  PT Discharge Recommendations: No further acute PT  PT Recommended Transfer Status: Independent  PPhysical Therapy eval completed per MD requisition. P.T. recommendations as outlined above. Recommend D/C from acute care when medically appropriate as deemed by medical staff.    Subjective       General Visit Information:  General  Reason for Referral: impaired mobility  Referred By: Dr. Chavez (PT/OT 7/5)  Past Medical History Relevant to Rehab: includes: HTN, HLD  Family/Caregiver Present: Yes (Friend present and supportive)  Prior to Session Communication: Bedside nurse  Patient Position Received: Bed, 2 rail up, Alarm off, not on at start of session  Preferred Learning Style: auditory, verbal  General Comment: Pt. is a 73yo whp presented to [facility] ED on 7/5/2024 with c/o headache, R facial droop with drooling, R arm weakness/numbness. on arrival to ED, symptoms have resolved per EMR. admitted for TIA workup.   CT head (-), CXR (-),   CT angio head: Moderate stenosis R MCA    Home  Living:  Home Living  Home Living Comments: Pt. lives alone in a 2nd floor apt with elevator or 12 steps with HR to access.  Tub shower with a grab bar but no seat. Laundry same floor.    Prior Level of Function:  Prior Function Per Pt/Caregiver Report  Prior Function Comments: Pt. amb without AD PTA and was I with I with ADLs and IADLs PTA. Pt. denied falls in last 3 months. Pt. drove PTA.    Precautions:  Precautions  Medical Precautions:  (Activity order: Bathroom privileges with A; aspiration precautions)  Precautions Comment: Per EMR: Low fall risk         Objective     Pain:  Pain Assessment  Pain Assessment: 0-10  0-10 (Numeric) Pain Score: 0 - No pain    Cognition:  Cognition  Overall Cognitive Status: Within Functional Limits  Orientation Level: Oriented X4    General Assessments:  General Observation  General Observation: Tele   Activity Tolerance  Endurance: Endurance does not limit participation in activity                 Dynamic Sitting Balance  Dynamic Sitting-Comments: Good static and dynamic sitting balance  Dynamic Standing Balance  Dynamic Standing-Comments: Goodstatic and dynamic standing balance    Functional Assessments:     Bed Mobility  Bed Mobility: Yes  Bed Mobility 1  Bed Mobility 1: Supine to sitting, Sitting to supine  Level of Assistance 1: Independent  Transfers  Transfer: Yes  Transfer 1  Technique 1: Sit to stand, Stand to sit  Transfer Device 1:  (No AD)  Transfer Level of Assistance 1: Independent  Ambulation/Gait Training  Ambulation/Gait Training Performed: Yes  Ambulation/Gait Training 1  Surface 1: Level tile  Device 1: No device  Assistance 1: Independent  Quality of Gait 1:  (Steady, reciprocal gait with no gait deviations)  Comments/Distance (ft) 1: 200'  Stairs  Stairs: No       Extremity/Trunk Assessments:        RLE   RLE : Within Functional Limits  LLE   LLE : Within Functional Limits    Outcome Measures:     [facility] Basic Mobility  Turning from your back to your side  while in a flat bed without using bedrails: None  Moving from lying on your back to sitting on the side of a flat bed without using bedrails: None  Moving to and from bed to chair (including a wheelchair): None  Standing up from a chair using your arms (e.g. wheelchair or bedside chair): None  To walk in hospital room: None  Climbing 3-5 steps with railing: None  Basic Mobility - Total Score: 24                                            Education Documentation  Mobility Training, taught by [place] PT at 7/5/2024  3:22 PM.  Learner: Patient  Readiness: Acceptance  Method: Explanation  Response: Verbalizes Understanding  Comment: Role of PT

## 2024-07-05 NOTE — PROGRESS NOTES
Occupational Therapy    Evaluation    Patient Name: Moon Martinez  MRN: [id number]  Today's Date: 7/5/2024  Time Calculation  Start Time: 1500  Stop Time: 1512  Time Calculation (min): 12 min        Assessment:  OT Assessment: Independent ADLs/transfers/mobility  Prognosis: Good  Barriers to Discharge: None  Evaluation/Treatment Tolerance: Patient tolerated treatment well  End of Session Communication: Bedside nurse  End of Session Patient Position: Bed, 2 rail up, Alarm off, not on at start of session  Prognosis: Good  Barriers to Discharge: None  Evaluation/Treatment Tolerance: Patient tolerated treatment well  Plan:  No Skilled OT: No acute OT goals identified  OT Frequency: OT eval only  OT Discharge Recommendations: No further acute OT, No OT needed after discharge  OT - OK to Discharge: Yes (when medically stable/cleared)       Subjective   Current Problem:  1. Nonintractable headache, unspecified chronicity pattern, unspecified headache type        2. TIA (transient ischemic attack)  Transthoracic Echo (TTE) Complete    Transthoracic Echo (TTE) Complete        General:  General  Reason for Referral: ADL impairment  Referred By: Scott 7/5, OT/PT  Past Medical History Relevant to Rehab: HTN, HLD  Family/Caregiver Present: Yes  Caregiver Feedback: friend  Prior to Session Communication: Bedside nurse  Patient Position Received: Bed, 2 rail up, Alarm off, not on at start of session  General Comment: Pt. is 73 y/o female to ED with c/o headache, R facial droop with drooling, R arm weakness/numbness. on arrival to ED, symptoms have resolved per EMR. admitted for TIA workup. CT head (-), CXR (-), CT angio head: Moderate stenosis R MCA  Precautions:  Precautions Comment: no known precautions    Pain:  Pain Assessment  Pain Assessment: 0-10  0-10 (Numeric) Pain Score: 0 - No pain  Pain Type:  (reports have chronic shoulder pain an dback pain, but no pain at this time.)    Objective   Cognition:  Overall Cognitive  Status: Within Functional Limits  Orientation Level: Oriented X4     Home Living:  Home Living Comments: Pt. lives on 2nd floor apartment; has elevator access; able to do stairs. tub shower, no seat or grab bars. Laundry on 2nd floor  Prior Function:  Prior Function Comments: Independent with ADLs/IADLs. No AD. Drives. Denies falls.    ADL:  Eating Assistance: Independent  Grooming Assistance: Independent  Bathing Assistance: Independent  UE Dressing Assistance: Independent  LE Dressing Assistance: Independent  Toileting Assistance with Device: Independent  Activity Tolerance:  Endurance: Endurance does not limit participation in activity  Bed Mobility/Transfers: Bed Mobility  Bed Mobility:  (Independent sup to sit, sit to sup, and boosting herself in bed.)    Transfers  Transfer:  (sit to stand, stand to sit; No AD. Independent)    Functional Mobility:  Functional Mobility  Functional Mobility Performed:  (>100 feet. No Ad. Independent)    Standing Balance:  Dynamic Standing Balance  Dynamic Standing-Comments: Good     Sensation:  Sensation Comment: denies numbness/tinging  Strength:  Strength Comments: Eulalio 5/5 MMT    Coordination:  Coordination Comment: WNL   Hand Function:  Gross Grasp: Functional  Extremities: RUE   RUE : Within Functional Limits and LUE   LUE: Within Functional Limits    Outcome Measures:[facility] Daily Activity  Putting on and taking off regular lower body clothing: None  Bathing (including washing, rinsing, drying): None  Putting on and taking off regular upper body clothing: None  Toileting, which includes using toilet, bedpan or urinal: None  Taking care of personal grooming such as brushing teeth: None  Eating Meals: None  Daily Activity - Total Score: 24      Education Documentation  ADL Training, taught by Wendy Yao OT at 7/5/2024  3:18 PM.  Learner: Patient  Readiness: Acceptance  Method: Explanation  Response: Verbalizes Understanding, Demonstrated Understanding

## 2024-07-05 NOTE — ED PROVIDER NOTES
HPI: The patient is a 74-year-old woman with history of hypertension who is on a baby aspirin who presents to the Emergency Department with a chief complaint of headache.  Patient reports that when she woke this morning she was in her normal state of health at roughly 5:30 AM.  She was watching TV and noticed that she developed a severe right-sided headache and noticed that she was drooling out of the right side of her mouth.  She felt some pain in her right arm as well as some numbness and felt like her speech was off.  She then presented to the ER for further evaluation and care.  She reports that at this time, the headache is still present but she no longer has the drooling or the speech difficulty or the numbness.  She reports has not had symptoms like this before and takes baby aspirin but no other blood thinners.  No trauma.  She reports that when she went to bed last night when she awoke this morning she was in her normal state of health.  Patient denies any new vision change or jaw claudication.     PAST MEDICAL HISTORY:  as per HPI  ALLERGIES:  as per HPI  MEDICATIONS:  as per HPI  FAMILY HISTORY: as per HPI  SURGICAL HISTORY: as per HPI  SOCIAL HISTORY: as per HPI     PHYSICAL EXAM:  VITAL SIGNS: Nursing notes reviewed.  GENERAL:  Alert and interactive  EYES:   Eyes track.  ENT:  Airway patent.  RESPIRATORY:  Nonlabored breathing.  CARDIOVASCULAR:  [Regular rate.] [Regular rhythm.]  GASTROINTESTINAL:  No distension.  MUSCULOSKELETAL:  No deformity.   NEUROLOGICAL:  Awake.  Tracks with eyes, PERRL, EOMI, equal sensation in V1-V3, no facial droop, sounds equal in both ears, 5/5 w/ strength shoulder raise, tongue protrudes at midline, soft palate raises symmetrically 5/5 strength in UE and LE, no deficit with light touch.  Normal finger-nose bilaterally.  NIH 0  SKIN:  Dry.    Stroke Documentation  Arrival From: [home]   Mode of Arrival: [private vehicle]   V.A.N. Assessment: [negative]  Stroke Onset: This  onset  LKW: 6:30 AM  TNK Administration Prior to Arrival: [No]  Patient Taken Directly to CT: [yes]        NIH Stroke Scale  1a. LOC: [0]  0 = Alert and responsive  1 = Arousable to minor stimulation  2 = Arousable only to painful stimulation  3 = Unarousable or reflex responses    1b. Questions: [0]  Ask patient´s age and month. Must be exact.  0 = Both correct  1 = One correct  2 = Neither correct    1c. Commands: [0]  Ask patient to open/close eyes,  and release non-affected hand.  0 = Both correct  1 = One correct  2 = Neither correct    2. Best Gaze: [0]  Horizontal extraocular movements by voluntary or reflexive testing.  0 = Normal  1 = Partial gaze palsy; abnormal gaze in one or both eyes  2 = Forced eye deviation or total paresis which cannot be overcome by oculocephalic maneuver    3. Visual Fields: [0]  Test by confrontation or threat as appropriate. If monocular, score field of good eye.  0 = No visual loss  1 = Partial hemianopia, quadrantanopia, extinction  2 = Complete hemianopia  3 = Bilateral hemianopia or blindness    4. Facial Palsy: [0]  If stuporous, check symmetry of grimace to pain. Paralysis (lower face).  0 = Normal  1 = Minor paralysis (normal looking face, asymmetric smile)  2 = Partial paralysis  3 = Complete paralysis (upper and lower face)    5a. Left motor arm: [0]  Arms outstretched 90° (if patient is sitting) or 45° (if supine) for 10 seconds. Encourage best effort, note paretic side.  0 = No drift  1 = Drift but does not hit bed  2 = Some antigravity effort, but cannot sustain  3 = No antigravity effort, but minimal movement present  4 = No movement at all X = Unable to assess due to amputation, fusion, etc    5b. Right motor arm: [0]  Arms outstretched 90° (if patient is sitting) or 45° (if supine) for 10 seconds. Encourage best effort, note paretic side.  0 = No drift  1 = Drift but does not hit bed  2 = Some antigravity effort, but cannot sustain  3 = No antigravity effort,  but minimal movement present  4 = No movement at all X = Unable to assess due to amputation, fusion, etc    6a. Left motor leg: [0]  Raise leg to 30° (always test patient supine) for 5 seconds.  0 = No drift  1 = Drift but does not hit bed  2 = Some antigravity effort, but cannot sustain  3 = No antigravity effort, but minimal movement present  4 = No movement at all X = Unable to assess due to amputation, fusion, etc    6b. Right motor leg: [0]  Raise leg to 30° (always test patient supine) for 5 seconds.  0 = No drift  1 = Drift but does not hit bed  2 = Some antigravity effort, but cannot sustain  3 = No antigravity effort, but minimal movement present  4 = No movement at all X = Unable to assess due to amputation, fusion, etc    7. Limb Ataxia: [0]  Check finger-nose-finger; heel-shin; score only if out of proportion to weakness.  0 = No ataxia (or aphasic, hemiplegic)  1 = Ataxia present in one limb  2 = Ataxia present in two limbs X = Unable to assess as above    8. Sensory: [0]  Use safety pin. Check grimace or withdrawal if stuporous. Score only stroke related losses.  0 = Normal  1 = Mild to moderate unilateral sensory loss but patient aware of touch  2 = Severe to total sensory loss, patient unaware of touch (or bilateral sensory loss or comatose)    9. Best language: [0]  Ask patient to describe cookie jar picture, name objects, read sentences. May use repeating, writing, stereognosis.  0 = Normal  1 = Mild-moderate aphasia  2 = Severe aphasia (almost no information exchanged)  3 = Mute, global aphasia, or coma    10. Dysarthria: [0]  0 = Normal  1 = Mild-moderate dysarthria  2 = Severe, unintelligible or mute  X = Intubation or mechanical barrier    11. Extinction and inattention: [0]  Simultaneously touch patient on both hands, show fingers in both visual fields, ask patient to describe deficit, left hand.  0 = Normal, none detected (or severe visual loss with normal cutaneous responses)  1 = Neglects  or extinguishes to bilateral simultaneous stimulation in any sensory modality (visual, tactile, auditory, spatial, or personal inattention)  2 = Profound vamsi-inattention or extinction in more than one modality    Total NIHSS score: [0]        MEDICAL DECISION MAKING (MDM):     DIAGNOSTIC STUDIES  Labs: Coagulation screen, CBC, CMP, magnesium level, troponin  Radiology: Chest x-ray, CT head, CTA head and neck     EKG 0751  Per my interpretation:  Electrocardiogram ECG  RATE:  [Normal]  RHYTHM: [Sinus]  AXIS: Left axis deviation  INTERVALS: [Normal]  ST-T WAVE CHANGES: T wave inversions in 2, 3, aVF and V4 V5 and V6.  ABNORMALITIES/COMPARISON: T wave inversions seen today are similar to most recent EKG in July 15, 2022.  Left axis is also stable.    EKG 0950  Per my interpretation:  Electrocardiogram ECG  RATE:  [Normal]  RHYTHM: [Sinus]  AXIS: Left axis deviation  INTERVALS: [Normal]  ST-T WAVE CHANGES: Similar T wave inversions to previous EKG  ABNORMALITIES/COMPARISON: EKG unchanged earlier today         SUMMARY:  The patient is admitted to the Emergency Department for evaluation of above. Complete history and physical examination was performed by me.  Patient presents with abrupt onset headache and some neurologic symptoms that raise suspicion for intracranial pathology.  Her neurologic deficits that she describes such as drooling which could point towards a facial droop, word finding difficulty and slurred speech as well as right upper extremity weakness and numbness have since resolved which is reassuring.  Given this we did not make her a brain attack but I did prior ties her for a CT of the head to investigate for bleed or other acute pathology.  I also initiated cardiac workup including EKG and blood work.  I was also worried about cerebrovascular dissection which could be causing pain and some of these transient neurologic symptoms so I sent her for CTA as well.  I treated her headache with Reglan Benadryl  and fluids.  Lower suspicion for giant cell arteritis given no vision change no temporal tenderness and no jaw claudication.  CT head was negative for acute pathology and the CTA did show some atherosclerotic disease and some concerns for narrowing of the right MCA.  Did discuss this with our on-call neurologist, Dr. Hernandez who recommended discussing this with the stroke neurology service at [facility].  I reached out to them spoke with Dr. Delfino Hanson who felt that the patient could be admitted locally for a TIA workup and did agree with giving the patient dual antiplatelet therapy given the patient's ABCD 2 score is 4.  Was given aspirin and Plavix will be admitted for further evaluation care.  Patient was reassessed and continues to have an NIH of 0 which is reassuring.  Patient will be admitted for further evaluation and care.     DIAGNOSIS:    TIA  Headache     DISPOSITION:    1) admission     Dakota Mcgee MD  07/05/24 [id number]

## 2024-07-06 ENCOUNTER — APPOINTMENT (OUTPATIENT)
Dept: CARDIOLOGY | Facility: HOSPITAL | Age: 74
End: 2024-07-06
Payer: COMMERCIAL

## 2024-07-06 ENCOUNTER — APPOINTMENT (OUTPATIENT)
Dept: RADIOLOGY | Facility: HOSPITAL | Age: 74
End: 2024-07-06
Payer: COMMERCIAL

## 2024-07-06 LAB
ANION GAP SERPL CALC-SCNC: 11 MMOL/L (ref 10–20)
AORTIC VALVE MEAN GRADIENT: 6 MMHG
AORTIC VALVE PEAK VELOCITY: 1.8 M/S
AV PEAK GRADIENT: 13 MMHG
AVA (PEAK VEL): 2.42 CM2
AVA (VTI): 2.63 CM2
BASOPHILS # BLD AUTO: 0.06 X10*3/UL (ref 0–0.1)
BASOPHILS NFR BLD AUTO: 1.1 %
BUN SERPL-MCNC: 27 MG/DL (ref 6–23)
CALCIUM SERPL-MCNC: 9.3 MG/DL (ref 8.6–10.3)
CHLORIDE SERPL-SCNC: 104 MMOL/L (ref 98–107)
CO2 SERPL-SCNC: 26 MMOL/L (ref 21–32)
CREAT SERPL-MCNC: 1.02 MG/DL (ref 0.5–1.05)
EGFRCR SERPLBLD CKD-EPI 2021: 57 ML/MIN/1.73M*2
EJECTION FRACTION APICAL 4 CHAMBER: 73.3
EJECTION FRACTION: 68 %
EOSINOPHIL # BLD AUTO: 0.24 X10*3/UL (ref 0–0.4)
EOSINOPHIL NFR BLD AUTO: 4.3 %
ERYTHROCYTE [DISTWIDTH] IN BLOOD BY AUTOMATED COUNT: 13.2 % (ref 11.5–14.5)
EST. AVERAGE GLUCOSE BLD GHB EST-MCNC: 120 MG/DL
GLUCOSE BLD MANUAL STRIP-MCNC: 104 MG/DL (ref 74–99)
GLUCOSE BLD MANUAL STRIP-MCNC: 105 MG/DL (ref 74–99)
GLUCOSE BLD MANUAL STRIP-MCNC: 90 MG/DL (ref 74–99)
GLUCOSE SERPL-MCNC: 94 MG/DL (ref 74–99)
HBA1C MFR BLD: 5.8 %
HCT VFR BLD AUTO: 41.4 % (ref 36–46)
HGB BLD-MCNC: 13.5 G/DL (ref 12–16)
IMM GRANULOCYTES # BLD AUTO: 0.01 X10*3/UL (ref 0–0.5)
IMM GRANULOCYTES NFR BLD AUTO: 0.2 % (ref 0–0.9)
LEFT ATRIUM VOLUME AREA LENGTH INDEX BSA: 11.4 ML/M2
LEFT VENTRICLE INTERNAL DIMENSION DIASTOLE: 3.83 CM (ref 3.5–6)
LEFT VENTRICULAR OUTFLOW TRACT DIAMETER: 1.95 CM
LV EJECTION FRACTION BIPLANE: 70 %
LYMPHOCYTES # BLD AUTO: 2.65 X10*3/UL (ref 0.8–3)
LYMPHOCYTES NFR BLD AUTO: 47 %
MCH RBC QN AUTO: 28.7 PG (ref 26–34)
MCHC RBC AUTO-ENTMCNC: 32.6 G/DL (ref 32–36)
MCV RBC AUTO: 88 FL (ref 80–100)
MITRAL VALVE E/A RATIO: 0.95
MONOCYTES # BLD AUTO: 0.62 X10*3/UL (ref 0.05–0.8)
MONOCYTES NFR BLD AUTO: 11 %
NEUTROPHILS # BLD AUTO: 2.06 X10*3/UL (ref 1.6–5.5)
NEUTROPHILS NFR BLD AUTO: 36.4 %
NRBC BLD-RTO: 0 /100 WBCS (ref 0–0)
PLATELET # BLD AUTO: 282 X10*3/UL (ref 150–450)
POTASSIUM SERPL-SCNC: 4.4 MMOL/L (ref 3.5–5.3)
RBC # BLD AUTO: 4.71 X10*6/UL (ref 4–5.2)
RIGHT VENTRICLE FREE WALL PEAK S': 13.5 CM/S
RIGHT VENTRICLE PEAK SYSTOLIC PRESSURE: 13.2 MMHG
SODIUM SERPL-SCNC: 137 MMOL/L (ref 136–145)
TRICUSPID ANNULAR PLANE SYSTOLIC EXCURSION: 1.6 CM
WBC # BLD AUTO: 5.6 X10*3/UL (ref 4.4–11.3)

## 2024-07-06 PROCEDURE — 93306 TTE W/DOPPLER COMPLETE: CPT | Performed by: INTERNAL MEDICINE

## 2024-07-06 PROCEDURE — 2500000001 HC RX 250 WO HCPCS SELF ADMINISTERED DRUGS (ALT 637 FOR MEDICARE OP): Performed by: INTERNAL MEDICINE

## 2024-07-06 PROCEDURE — 2500000004 HC RX 250 GENERAL PHARMACY W/ HCPCS (ALT 636 FOR OP/ED): Performed by: INTERNAL MEDICINE

## 2024-07-06 PROCEDURE — 93970 EXTREMITY STUDY: CPT

## 2024-07-06 PROCEDURE — 82947 ASSAY GLUCOSE BLOOD QUANT: CPT | Mod: 91

## 2024-07-06 PROCEDURE — 36415 COLL VENOUS BLD VENIPUNCTURE: CPT | Performed by: INTERNAL MEDICINE

## 2024-07-06 PROCEDURE — 93306 TTE W/DOPPLER COMPLETE: CPT

## 2024-07-06 PROCEDURE — 99223 1ST HOSP IP/OBS HIGH 75: CPT | Performed by: PSYCHIATRY & NEUROLOGY

## 2024-07-06 PROCEDURE — 82374 ASSAY BLOOD CARBON DIOXIDE: CPT | Performed by: INTERNAL MEDICINE

## 2024-07-06 PROCEDURE — G0378 HOSPITAL OBSERVATION PER HR: HCPCS

## 2024-07-06 PROCEDURE — 93971 EXTREMITY STUDY: CPT | Performed by: RADIOLOGY

## 2024-07-06 PROCEDURE — 85025 COMPLETE CBC W/AUTO DIFF WBC: CPT | Performed by: INTERNAL MEDICINE

## 2024-07-06 ASSESSMENT — COGNITIVE AND FUNCTIONAL STATUS - GENERAL
DAILY ACTIVITIY SCORE: 24
MOBILITY SCORE: 24

## 2024-07-06 ASSESSMENT — ENCOUNTER SYMPTOMS
SINUS PRESSURE: 0
CONFUSION: 0
HEADACHES: 0
HALLUCINATIONS: 0
BRUISES/BLEEDS EASILY: 0
JOINT SWELLING: 0
NECK PAIN: 0
LIGHT-HEADEDNESS: 0
SEIZURES: 0
ADENOPATHY: 0
WHEEZING: 0
PALPITATIONS: 0
FACIAL ASYMMETRY: 0
EYE PAIN: 0
HYPERACTIVE: 0
FATIGUE: 0
PHOTOPHOBIA: 0
VOMITING: 0
NECK STIFFNESS: 0
COUGH: 0
NAUSEA: 0
AGITATION: 0
SHORTNESS OF BREATH: 0
BACK PAIN: 0
TREMORS: 0
FEVER: 0
SLEEP DISTURBANCE: 0
SPEECH DIFFICULTY: 1
TROUBLE SWALLOWING: 0
NUMBNESS: 0
ABDOMINAL PAIN: 0
UNEXPECTED WEIGHT CHANGE: 0
WEAKNESS: 1
DIZZINESS: 0
ARTHRALGIAS: 0
FREQUENCY: 0
DIFFICULTY URINATING: 0

## 2024-07-06 ASSESSMENT — PAIN - FUNCTIONAL ASSESSMENT: PAIN_FUNCTIONAL_ASSESSMENT: 0-10

## 2024-07-06 ASSESSMENT — PAIN SCALES - GENERAL: PAINLEVEL_OUTOF10: 7

## 2024-07-06 NOTE — CONSULTS
History Of Present Illness  Moon Martinez is a 74 y.o. female presenting with  left -sided weakness ,speech difficulty and clumsiness.  She came to the emergency room and by the time she came her symptoms were resolved.  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  Patient was not a candidate for TNK or surgical intervention.  By the time patient came back from the CAT scan all her symptoms were resolved    She has a longstanding history of TIA which occurred almost like 15 or 20 years ago    At the time of my evaluation she denies any headache nausea vomiting or any weakness numbness or difficulty with speech or swallowing    Please refer to the initial H&P and the ER records for details.        Past Medical History  Past Medical History:   Diagnosis Date    Personal history of other diseases of the circulatory system     History of hypertension    Personal history of other endocrine, nutritional and metabolic disease     History of hypercholesterolemia     Surgical History  Past Surgical History:   Procedure Laterality Date    LITHOTRIPSY  03/24/2015    Renal Lithotripsy    OTHER SURGICAL HISTORY  03/24/2015    Cystoscopy With Insertion Of Ureteral Stent    OTHER SURGICAL HISTORY  03/24/2015    Abdominal Surgery     Social History  Social History     Tobacco Use    Smoking status: Never    Smokeless tobacco: Never     Allergies  Hydralazine, Hydrochlorothiazide, Lisinopril, Milk, and Morphine  Home Medications  Medications Prior to Admission   Medication Sig Dispense Refill Last Dose    amLODIPine (Norvasc) 10 mg tablet Take 1 tablet (10 mg) by mouth once daily.   7/5/2024 at am    aspirin 81 mg EC tablet Take 1 tablet (81 mg) by mouth once daily.   7/5/2024 at am    atenolol (Tenormin) 100 mg tablet Take 1 tablet (100 mg) by mouth once daily.   7/5/2024 at am    atorvastatin (Lipitor) 10 mg tablet Take 1 tablet (10 mg) by mouth once daily.    7/5/2024 at am    losartan-hydrochlorothiazide (Hyzaar) 100-25 mg tablet Take 1 tablet by mouth once daily.   7/5/2024 at am    cholecalciferol (Vitamin D-3) 50,000 unit capsule Take 1 capsule (50,000 Units) by mouth 1 (one) time per week. Sunday 6/30/2024    diclofenac sodium (Voltaren) 1 % gel Apply 4.5 inches (4 g) topically 4 times a day as needed.   Unknown       Review of Systems   Constitutional:  Negative for fatigue, fever and unexpected weight change.   HENT:  Negative for dental problem, ear pain, hearing loss, sinus pressure, tinnitus and trouble swallowing.    Eyes:  Negative for photophobia, pain and visual disturbance.   Respiratory:  Negative for cough, shortness of breath and wheezing.    Cardiovascular:  Negative for chest pain, palpitations and leg swelling.   Gastrointestinal:  Negative for abdominal pain, nausea and vomiting.   Genitourinary:  Negative for difficulty urinating, enuresis and frequency.   Musculoskeletal:  Negative for arthralgias, back pain, joint swelling, neck pain and neck stiffness.   Skin:  Negative for pallor and rash.   Allergic/Immunologic: Negative for food allergies.   Neurological:  Positive for speech difficulty and weakness. Negative for dizziness, tremors, seizures, syncope, facial asymmetry, light-headedness, numbness and headaches.   Hematological:  Negative for adenopathy. Does not bruise/bleed easily.   Psychiatric/Behavioral:  Negative for agitation, behavioral problems, confusion, hallucinations and sleep disturbance. The patient is not hyperactive.        Physical Exam  Neurological Exam  Constitutional: General appearance: no acute distress   Auscultation of Heart: Regular rate and rhythm, no murmurs, normal S1 and S2.   Carotid Arteries: Intact without any bruits.   Neck is supple.   No lymph adenopathy.   Peripheral Vascular Exam: Pulses +2 and equal in all extremities. No swelling, varicosities, edema or tenderness to palpations.    Abdomen is soft,  nondistended. No organomegaly.  Mental status: The patient was in no distress, alert, interactive and cooperative. Affect is appropriate.   Orientation: oriented to person, oriented to place and oriented to time.   Memory: recent memory intact and remote memory intact.   Attention: normal attention span and normal concentrating ability.   Language: normal comprehension and no difficulty naming common objects.   Fund of knowledge: Patient displays adequate knowledge of current events, adequate fund of knowledge regarding past history and adequate fund of knowledge regarding vocabulary.   Eyes: The ophthalmoscopic examination was normal. The fundi are visualized with normal disc margins and without.  Cranial nerve II: Visual fields full to confrontation.   Cranial nerves III, IV, and VI: Pupils round, equally reactive to light; no ptosis. EOMs intact. No nystagmus.   Cranial Nerve V: Facial sensation intact bilaterally.   Cranial nerve VII: Normal and symmetric facial strength.   Cranial nerve VIII: Hearing is intact bilaterally to finger rub / whisper.   Cranial nerves IX and X: Palate elevates symmetrically.   Cranial nerve XI: Shoulder shrug and neck rotation strength are intact.   Cranial nerve XII: Tongue midline with normal strength.   Motor: Motor exam was normal. Muscle bulk was normal in both upper and lower extremities. Muscle tone was normal in both upper and lower extremities. Muscle strength was 5/5 throughout. no abnormal or adventitious movements were present.   Deep Tendon Reflexes: left biceps 2+ , right biceps 2+, left triceps 2+, right triceps 2+, left brachioradialis 2+, right brachioradialis 2+, left patella 2+, right patella 2+, left ankle jerk 2+, right ankle jerk 2+   Plantar Reflex: Toes downgoing to plantar stimulation on the left. Toes downgoing to plantar stimulation on the right.   Sensory Exam: Normal to light touch.   Coordination: There is no limb dystaxia and rapid alternating  "movements are intact.  Gait: Gait is normal without spasticity, ataxia or bradykinesia. Stance is stable with a negative Romberg.  Last Recorded Vitals  Blood pressure 169/82, pulse 80, temperature 36.1 °C (97 °F), resp. rate 17, height 1.499 m (4' 11\"), weight 58.1 kg (128 lb), SpO2 97%.      Relevant Results  Recent Results (from the past 24 hour(s))   POCT GLUCOSE    Collection Time: 07/05/24  7:42 PM   Result Value Ref Range    POCT Glucose 136 (H) 74 - 99 mg/dL   CBC and Auto Differential    Collection Time: 07/06/24  6:05 AM   Result Value Ref Range    WBC 5.6 4.4 - 11.3 x10*3/uL    nRBC 0.0 0.0 - 0.0 /100 WBCs    RBC 4.71 4.00 - 5.20 x10*6/uL    Hemoglobin 13.5 12.0 - 16.0 g/dL    Hematocrit 41.4 36.0 - 46.0 %    MCV 88 80 - 100 fL    MCH 28.7 26.0 - 34.0 pg    MCHC 32.6 32.0 - 36.0 g/dL    RDW 13.2 11.5 - 14.5 %    Platelets 282 150 - 450 x10*3/uL    Neutrophils % 36.4 40.0 - 80.0 %    Immature Granulocytes %, Automated 0.2 0.0 - 0.9 %    Lymphocytes % 47.0 13.0 - 44.0 %    Monocytes % 11.0 2.0 - 10.0 %    Eosinophils % 4.3 0.0 - 6.0 %    Basophils % 1.1 0.0 - 2.0 %    Neutrophils Absolute 2.06 1.60 - 5.50 x10*3/uL    Immature Granulocytes Absolute, Automated 0.01 0.00 - 0.50 x10*3/uL    Lymphocytes Absolute 2.65 0.80 - 3.00 x10*3/uL    Monocytes Absolute 0.62 0.05 - 0.80 x10*3/uL    Eosinophils Absolute 0.24 0.00 - 0.40 x10*3/uL    Basophils Absolute 0.06 0.00 - 0.10 x10*3/uL   Basic Metabolic Panel    Collection Time: 07/06/24  6:05 AM   Result Value Ref Range    Glucose 94 74 - 99 mg/dL    Sodium 137 136 - 145 mmol/L    Potassium 4.4 3.5 - 5.3 mmol/L    Chloride 104 98 - 107 mmol/L    Bicarbonate 26 21 - 32 mmol/L    Anion Gap 11 10 - 20 mmol/L    Urea Nitrogen 27 (H) 6 - 23 mg/dL    Creatinine 1.02 0.50 - 1.05 mg/dL    eGFR 57 (L) >60 mL/min/1.73m*2    Calcium 9.3 8.6 - 10.3 mg/dL   POCT GLUCOSE    Collection Time: 07/06/24  6:05 AM   Result Value Ref Range    POCT Glucose 90 74 - 99 mg/dL "   Transthoracic Echo (TTE) Complete    Collection Time: 07/06/24 10:05 AM   Result Value Ref Range    AV mn grad 6.0 mmHg    AV pk geri 1.80 m/s    LV Biplane EF 70 %    LVOT diam 1.95 cm    MV E/A ratio 0.95     Tricuspid annular plane systolic excursion 1.6 cm    LA vol index A/L 11.4 ml/m2    LV EF 68 %    RV free wall pk S' 13.50 cm/s    RVSP 13.2 mmHg    LVIDd 3.83 cm    Aortic Valve Area by Continuity of Peak Velocity 2.42 cm2    AV pk grad 13.0 mmHg    Aortic Valve Area by Continuity of VTI 2.63 cm2    LV A4C EF 73.3    POCT GLUCOSE    Collection Time: 07/06/24 11:06 AM   Result Value Ref Range    POCT Glucose 105 (H) 74 - 99 mg/dL      NIH Stroke Scale  1A. Level of Consciousness: Alert, Keenly Responsive  1B. Ask Month and Age: Both Questions Right  1C. Blink Eyes & Squeeze Hands: Performs Both Tasks  2. Best Gaze: Normal  3. Visual: No Visual Loss  4. Facial Palsy: Normal Symmetrical Movements  5A. Motor - Left Arm: No Drift  5B. Motor - Right Arm: No Drift  6A. Motor - Left Leg: No Drift  6B. Motor - Right Leg: No Drift  7. Limb Ataxia: Absent  8. Sensory Loss: Normal  9. Best Language: No Aphasia  10. Dysarthria: Normal  11. Extinction and Inattention: No Abnormality  NIH Stroke Scale: 0           [place] Coma Scale  Best Eye Response: Spontaneous  Best Verbal Response: Oriented  Best Motor Response: Follows commands  [place] Coma Scale Score: 15              CMP:    Results from last 7 days   Lab Units 07/06/24  0605 07/05/24  0752   SODIUM mmol/L 137 138   POTASSIUM mmol/L 4.4 4.5   CHLORIDE mmol/L 104 104   CO2 mmol/L 26 26   BUN mg/dL 27* 25*   CREATININE mg/dL 1.02 1.03   GLUCOSE mg/dL 94 100*   PROTEIN TOTAL g/dL  --  7.5   CALCIUM mg/dL 9.3 9.4   BILIRUBIN TOTAL mg/dL  --  0.4   ALK PHOS U/L  --  74   AST U/L  --  17   ALT U/L  --  14       Lipid Panel:  Results from last 7 days   Lab Units 07/05/24  1501   HDL mg/dL 42.1   CHOLESTEROL/HDL RATIO  3.8   VLDL mg/dL 32   TRIGLYCERIDES mg/dL 158*   NON  HDL CHOL. mg/dL 117            MR brain wo IV contrast    Result Date: 7/5/2024  Interpreted By:  Gareth Rossi, STUDY: MR BRAIN WO IV CONTRAST   INDICATION: Signs/Symptoms:TIA   COMPARISON: Head CT 07/05/2024.   ACCESSION NUMBER(S): [id number]   ORDERING CLINICIAN: JOHN GONZALEZ   TECHNIQUE: Multi-planar multi-sequential MR imaging of the brain was performed without intravenous contrast.   FINDINGS:   No acute infarction, intracranial hemorrhage or mass. Moderate microangiopathic disease.   No hydrocephalus.  No extra-axial fluid collections.   The skull base flow voids are present.   The visualized intraorbital contents are normal.   Mild mucosal disease of the ethmoid air cells. Trace left mastoid effusion.   The visualized osseous structures, soft tissues and partially visualized parotid glands appear normal.       No acute intracranial abnormality. Moderate microangiopathic disease.   Signed by: Gareth Rossi 7/5/2024 6:19 PM Dictation workstation:   [id number]   CT head wo IV contrast    Result Date: 7/5/2024  Interpreted By:  Nicole Kapoor, STUDY: CT HEAD WO IV CONTRAST;  7/5/2024 7:48 am   INDICATION: Signs/Symptoms:R side headache, transient slurred speech.   COMPARISON: 01/20/2015   ACCESSION NUMBER(S): [id number]   ORDERING CLINICIAN: MATHEW CULLEN   TECHNIQUE: Noncontrast axial CT scan of head was performed. Angled reformats in brain and bone windows and coronal and sagittal reformats in brain window were generated.   FINDINGS: CSF Spaces: The ventricles, sulci and basal cisterns are within normal limits. There is no extraaxial fluid collection.   Parenchyma:  The grey-white differentiation is intact. There is no mass effect or midline shift.  There is no intracranial hemorrhage.   Calvarium: The calvarium is unremarkable.   Paranasal sinuses and mastoids: Mild mucoperiosteal thickening is noted in ethmoid air cells. Paranasal sinuses and mastoid air cells are otherwise clear.       No evidence  of acute cortical infarct or intracranial hemorrhage.   No evidence of intracranial hemorrhage or displaced skull fracture.   MACRO: None.   Signed by: Nicole Kapoor 7/5/2024 8:20 AM Dictation workstation:   [id number]   Transthoracic Echo (TTE) Complete    Result Date: 7/6/2024          [place] [address]  Tel [phone] Fax [phone] TRANSTHORACIC ECHOCARDIOGRAM REPORT Patient Name:      VICTOR M Casanova Physician:    [id number] Nathan Lee MD, [facility] Study Date:        7/6/2024              Ordering Provider:    [id number] JOHN GONZALEZ MRN/PID:           [id number]              Fellow: Accession#:        [id number]          Nurse: Date of Birth/Age: 1950 / 74 years   Sonographer:          Jenn Tinoco RDCS Gender:            F                     Additional Staff: Height:            149.86 cm             Admit Date:           7/5/2024 Weight:            57.61 kg              Admission Status:     Inpatient -                                                                Routine BSA / BMI:         1.52 m2 / 25.65 kg/m2 Department Location:  [address] [place] [address] Blood Pressure: 134 /71 mmHg Study Type:    TRANSTHORACIC ECHO (TTE) COMPLETE Diagnosis/ICD: 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 Study Detail: The following Echo studies were performed: M-Mode, 2D, Doppler and               color flow. Technically challenging study due to body habitus.               Agitated saline used as a contrast agent for intraseptal  flow               evaluation and Definity used as a contrast agent for endocardial               border definition. Total contrast used for this procedure was 4 mL               via IV push.  PHYSICIAN INTERPRETATION: Left Ventricle: The left ventricular systolic function is normal, with a visually estimated ejection fraction of 65-70%. There are no regional wall motion abnormalities. The left ventricular cavity size is normal. The left ventricular septal wall thickness is moderately increased. There is moderately increased left ventricular posterior wall thickness. Spectral Doppler shows an impaired relaxation pattern of left ventricular diastolic filling. Left Atrium: The left atrium is normal in size. A bubble study using agitated saline was performed. Bubble study is positive. A moderate PFO (11-20 bubbles) was demonstrated. Left atrial volume index is 11.2 ml/m2. Right Ventricle: The right ventricle is normal in size. There is mildly increased right ventriclar wall thickness. There is normal right ventricular global systolic function. Normal right ventricular chamber size and function. Right Atrium: The right atrium is normal in size. Aortic Valve: The aortic valve is trileaflet. The aortic valve dimensionless index is 0.88. There is no evidence of aortic valve regurgitation. The peak instantaneous gradient of the aortic valve is 13.0 mmHg. The mean gradient of the aortic valve is 6.0 mmHg. Mitral Valve: The mitral valve is normal in structure. There is mild mitral valve regurgitation. Mild (+) mitral regurgitation. Tricuspid Valve: The tricuspid valve is structurally normal. No evidence of tricuspid regurgitation. Trivial tricuspid regurgitation with estimated RVSP 13 mmHg. Pulmonic Valve: The pulmonic valve is structurally normal. There is no indication of pulmonic valve regurgitation. Pericardium: There is no pericardial effusion noted. Aorta: The aortic root is normal. Systemic Veins: The inferior vena  cava was not well visualized.  CONCLUSIONS:  1. The left ventricular systolic function is normal, with a visually estimated ejection fraction of 65-70%.  2. Spectral Doppler shows an impaired relaxation pattern of left ventricular diastolic filling.  3. Moderately increased left ventricular septal thickness.  4. The left ventricular posterior wall thickness is moderately increased.  5. There is normal right ventricular global systolic function.  6. Mild (+) mitral regurgitation.  7. Trivial tricuspid regurgitation with estimated RVSP 13 mmHg.  8. No previous available for comparison.  9. A bubble study using agitated saline was performed. Bubble study is positive. A moderate PFO (11-20 bubbles) was demonstrated. QUANTITATIVE DATA SUMMARY: 2D MEASUREMENTS:                           Normal Ranges: Ao Root d:     2.13 cm    (2.0-3.7cm) LAs:           3.46 cm    (2.7-4.0cm) IVSd:          1.37 cm    (0.6-1.1cm) LVPWd:         1.38 cm    (0.6-1.1cm) LVIDd:         3.82 cm    (3.9-5.9cm) LVIDs:         2.40 cm LV Mass Index: 144.9 g/m2 LV % FS        37.4 % LA VOLUME:                               Normal Ranges: LA Vol A4C:        17.0 ml    (22+/-6mL/m2) LA Vol A2C:        16.7 ml LA Vol BP:         17.3 ml LA Vol Index A4C:  11.2ml/m2 LA Vol Index A2C:  11.0 ml/m2 LA Vol Index BP:   11.4 ml/m2 LA Area A4C:       8.6 cm2 LA Area A2C:       8.3 cm2 LA Major Axis A4C: 3.7 cm LA Major Axis A2C: 3.5 cm LA Volume Index:   10.9 ml/m2 RA VOLUME BY A/L METHOD:                              Normal Ranges: RA Vol A4C:        13.6 ml   (8.3-19.5ml) RA Vol Index A4C:  9.0 ml/m2 RA Area A4C:       7.6 cm2 RA Major Axis A4C: 3.6 cm AORTA MEASUREMENTS:                    Normal Ranges: Asc Ao, d: 2.99 cm (2.1-3.4cm) LV SYSTOLIC FUNCTION BY 2D PLANIMETRY (MOD):                      Normal Ranges: EF-A4C View:    73 % (>=55%) EF-A2C View:    68 % EF-Biplane:     70 % EF-Visual:      68 % LV EF Reported: 68 % LV DIASTOLIC FUNCTION:                          Normal Ranges: MV Peak E:    0.59 m/s  (0.7-1.2 m/s) MV Peak A:    0.62 m/s  (0.42-0.7 m/s) E/A Ratio:    0.95      (1.0-2.2) MV e'         0.058 m/s (>8.0) MV lateral e' 0.06 m/s MV medial e'  0.06 m/s E/e' Ratio:   10.15     (<8.0) MITRAL VALVE:                 Normal Ranges: MV DT: 227 msec (150-240msec) AORTIC VALVE:                                    Normal Ranges: AoV Vmax:                1.80 m/s  (<=1.7m/s) AoV Peak P.0 mmHg (<20mmHg) AoV Mean P.0 mmHg  (1.7-11.5mmHg) LVOT Max Keo:            1.46 m/s  (<=1.1m/s) AoV VTI:                 26.50 cm  (18-25cm) LVOT VTI:                23.30 cm LVOT Diameter:           1.95 cm   (1.8-2.4cm) AoV Area, VTI:           2.63 cm2  (2.5-5.5cm2) AoV Area,Vmax:           2.42 cm2  (2.5-4.5cm2) AoV Dimensionless Index: 0.88  RIGHT VENTRICLE: RV Basal 2.41 cm RV Mid   2.35 cm RV Major 6.0 cm TAPSE:   16.0 mm RV s'    0.14 m/s TRICUSPID VALVE/RVSP:                             Normal Ranges: Peak TR Velocity: 1.60 m/s RV Syst Pressure: 13.2 mmHg (< 30mmHg) PULMONIC VALVE:                         Normal Ranges: PV Accel Time: 92 msec  (>120ms) PV Max Keo:    1.3 m/s  (0.6-0.9m/s) PV Max P.1 mmHg  [id number] Nathan Lee MD, FACC Electronically signed on 2024 at 11:59:25 AM  ** Final **        Results from last 7 days   Lab Units 24  1500   HEMOGLOBIN A1C % 5.8*     BNP   Date/Time Value Ref Range Status   2024 03:01 PM 19 0 - 99 pg/mL Final        I have personally reviewed the following imaging results Lower extremity venous duplex bilateral    Result Date: 2024  Interpreted By:  Schoenberger, Joseph, STUDY: [facility] US LOWER EXTREMITY VENOUS DUPLEX BILATERAL;  2024 3:13 pm   INDICATION: Signs/Symptoms:TIA, with PFO, r/o lower extremity DVT.   COMPARISON: None.   ACCESSION NUMBER(S): [id number]   ORDERING CLINICIAN: JOHN GONZALEZ   TECHNIQUE: Vascular ultrasound of the bilateral lower extremities  was performed. Real-time compression views as well as Gray scale, color Doppler and spectral Doppler waveform analysis was performed.   FINDINGS: Evaluation of the visualized portions of the bilateral common femoral vein, proximal, mid, and distal femoral vein, and popliteal vein were performed.  Evaluation of the visualized portions of the  posterior tibial and peroneal veins were also performed.   Limitations:  None   The evaluated veins demonstrate normal compressibility. There is intact venous flow demonstrating normal respiratory variability and normal augmentation of flow with calf compression. Therefore, there is no ultrasonographic evidence for deep vein thrombosis within the evaluated veins.   Respiratory variation and augmentation to calf pressure was noted.       No sonographic findings suggesting right or left lower extremity deep venous thrombosis.   MACRO: None   Signed by: Joseph Schoenberger 7/6/2024 3:26 PM Dictation workstation:   [id number]    Transthoracic Echo (TTE) Complete    Result Date: 7/6/2024          [place] [id number]  Tel [phone] Fax [phone] TRANSTHORACIC ECHOCARDIOGRAM REPORT Patient Name:      VICTOR M Casanova Physician:    [id number] Nathan Lee MD, [facility] Study Date:        7/6/2024              Ordering Provider:    [id number] JOHN GONZALEZ MRN/PID:           [id number]              Fellow: Accession#:        [id number]          Nurse: Date of Birth/Age: 1950 / 74 years   Sonographer:          Jenn Tinoco RDCS Gender:            F                     Additional Staff: Height:            149.86 cm             Admit Date:           7/5/2024 Weight:             57.61 kg              Admission Status:     Inpatient -                                                                Routine BSA / BMI:         1.52 m2 / 25.65 kg/m2 Department Location:  [address] [place] [address] Blood Pressure: 134 /71 mmHg Study Type:    TRANSTHORACIC ECHO (TTE) COMPLETE Diagnosis/ICD: 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 Study Detail: The following Echo studies were performed: M-Mode, 2D, Doppler and               color flow. Technically challenging study due to body habitus.               Agitated saline used as a contrast agent for intraseptal flow               evaluation and Definity used as a contrast agent for endocardial               border definition. Total contrast used for this procedure was 4 mL               via IV push.  PHYSICIAN INTERPRETATION: Left Ventricle: The left ventricular systolic function is normal, with a visually estimated ejection fraction of 65-70%. There are no regional wall motion abnormalities. The left ventricular cavity size is normal. The left ventricular septal wall thickness is moderately increased. There is moderately increased left ventricular posterior wall thickness. Spectral Doppler shows an impaired relaxation pattern of left ventricular diastolic filling. Left Atrium: The left atrium is normal in size. A bubble study using agitated saline was performed. Bubble study is positive. A moderate PFO (11-20 bubbles) was demonstrated. Left atrial volume index is 11.2 ml/m2. Right Ventricle: The right ventricle is normal in size. There is mildly increased right ventriclar wall thickness. There is normal right ventricular global systolic function. Normal right ventricular chamber size and function. Right Atrium: The right atrium is normal in size. Aortic Valve: The aortic valve is  trileaflet. The aortic valve dimensionless index is 0.88. There is no evidence of aortic valve regurgitation. The peak instantaneous gradient of the aortic valve is 13.0 mmHg. The mean gradient of the aortic valve is 6.0 mmHg. Mitral Valve: The mitral valve is normal in structure. There is mild mitral valve regurgitation. Mild (+) mitral regurgitation. Tricuspid Valve: The tricuspid valve is structurally normal. No evidence of tricuspid regurgitation. Trivial tricuspid regurgitation with estimated RVSP 13 mmHg. Pulmonic Valve: The pulmonic valve is structurally normal. There is no indication of pulmonic valve regurgitation. Pericardium: There is no pericardial effusion noted. Aorta: The aortic root is normal. Systemic Veins: The inferior vena cava was not well visualized.  CONCLUSIONS:  1. The left ventricular systolic function is normal, with a visually estimated ejection fraction of 65-70%.  2. Spectral Doppler shows an impaired relaxation pattern of left ventricular diastolic filling.  3. Moderately increased left ventricular septal thickness.  4. The left ventricular posterior wall thickness is moderately increased.  5. There is normal right ventricular global systolic function.  6. Mild (+) mitral regurgitation.  7. Trivial tricuspid regurgitation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

## 2024-07-06 NOTE — PROGRESS NOTES
Moon Jeronimo is a 74 y.o. female on day 0 of admission presenting with TIA (transient ischemic attack).      Subjective   Patient is sitting on the bed comfortably, denies any chest pain shortness of breath or palpitations  Denies any weakness  DENIES ANY ABDOMINAL PAIN NAUSEA OR VOMITINGS  NO OTHER SIGNIFICANT OVERNIGHT ISSUES.    Objective     Last Recorded Vitals  /74 (BP Location: Right arm, Patient Position: Lying)   Pulse 88   Temp 35.9 °C (96.6 °F) (Temporal)   Resp 17   Wt 58.1 kg (128 lb)   SpO2 96%   Intake/Output last 3 Shifts:  No intake or output data in the 24 hours ending 07/06/24 1350    Admission Weight  Weight: 57.6 kg (127 lb) (07/05/24 0732)    Daily Weight  07/05/24 : 58.1 kg (128 lb)    Image Results  Transthoracic Echo (TTE) [place] [address]   Tel [phone] Fax [phone]    TRANSTHORACIC ECHOCARDIOGRAM REPORT    Patient Name:      MOON JERONIMO           Reading Physician:    [id number] Nathan Lee MD, [facility]  Study Date:        7/6/2024              Ordering Provider:    [id number] JOHN GONZALEZ  MRN/PID:           [id number]              Fellow:  Accession#:        [id number]          Nurse:  Date of Birth/Age: 1950 / 74 years   Sonographer:          Jenn Tinoco RDCS  Gender:            F                     Additional Staff:  Height:            149.86 cm             Admit Date:           7/5/2024  Weight:            57.61 kg              Admission Status:     Inpatient -                                                                 Routine  BSA / BMI:         1.52 m2 / 25.65 kg/m2 Department Location:  [address] [place] [address]  Blood Pressure: 134 /71 mmHg    Study  Type:    TRANSTHORACIC ECHO (TTE) COMPLETE  Diagnosis/ICD: Other transient cerebral ischemic attacks and related                 syndromes-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    Study Detail: The following Echo studies were performed: M-Mode, 2D, Doppler and                color flow. Technically challenging study due to body habitus.                Agitated saline used as a contrast agent for intraseptal flow                evaluation and Definity used as a contrast agent for endocardial                border definition. Total contrast used for this procedure was 4 mL                via IV push.       PHYSICIAN INTERPRETATION:  Left Ventricle: The left ventricular systolic function is normal, with a visually estimated ejection fraction of 65-70%. There are no regional wall motion abnormalities. The left ventricular cavity size is normal. The left ventricular septal wall thickness is moderately increased. There is moderately increased left ventricular posterior wall thickness. Spectral Doppler shows an impaired relaxation pattern of left ventricular diastolic filling.  Left Atrium: The left atrium is normal in size. A bubble study using agitated saline was performed. Bubble study is positive. A moderate PFO (11-20 bubbles) was demonstrated. Left atrial volume index is 11.2 ml/m2.  Right Ventricle: The right ventricle is normal in size. There is mildly increased right ventriclar wall thickness. There is normal right ventricular global systolic function. Normal right ventricular chamber size and function.  Right Atrium: The right atrium is normal in size.  Aortic Valve: The aortic valve is trileaflet. The aortic valve dimensionless index is 0.88. There is no evidence of aortic valve regurgitation. The peak instantaneous gradient of the aortic valve is 13.0 mmHg. The mean gradient of the aortic  valve is 6.0 mmHg.  Mitral Valve: The mitral valve is normal in structure. There is mild mitral valve regurgitation. Mild (+) mitral regurgitation.  Tricuspid Valve: The tricuspid valve is structurally normal. No evidence of tricuspid regurgitation. Trivial tricuspid regurgitation with estimated RVSP 13 mmHg.  Pulmonic Valve: The pulmonic valve is structurally normal. There is no indication of pulmonic valve regurgitation.  Pericardium: There is no pericardial effusion noted.  Aorta: The aortic root is normal.  Systemic Veins: The inferior vena cava was not well visualized.       CONCLUSIONS:   1. The left ventricular systolic function is normal, with a visually estimated ejection fraction of 65-70%.   2. Spectral Doppler shows an impaired relaxation pattern of left ventricular diastolic filling.   3. Moderately increased left ventricular septal thickness.   4. The left ventricular posterior wall thickness is moderately increased.   5. There is normal right ventricular global systolic function.   6. Mild (+) mitral regurgitation.   7. Trivial tricuspid regurgitation with estimated RVSP 13 mmHg.   8. No previous available for comparison.   9. A bubble study using agitated saline was performed. Bubble study is positive. A moderate PFO (11-20 bubbles) was demonstrated.    QUANTITATIVE DATA SUMMARY:  2D MEASUREMENTS:                            Normal Ranges:  Ao Root d:     2.13 cm    (2.0-3.7cm)  LAs:           3.46 cm    (2.7-4.0cm)  IVSd:          1.37 cm    (0.6-1.1cm)  LVPWd:         1.38 cm    (0.6-1.1cm)  LVIDd:         3.82 cm    (3.9-5.9cm)  LVIDs:         2.40 cm  LV Mass Index: 144.9 g/m2  LV % FS        37.4 %    LA VOLUME:                                Normal Ranges:  LA Vol A4C:        17.0 ml    (22+/-6mL/m2)  LA Vol A2C:        16.7 ml  LA Vol BP:         17.3 ml  LA Vol Index A4C:  11.2ml/m2  LA Vol Index A2C:  11.0 ml/m2  LA Vol Index BP:   11.4 ml/m2  LA Area A4C:       8.6 cm2  LA Area A2C:        8.3 cm2  LA Major Axis A4C: 3.7 cm  LA Major Axis A2C: 3.5 cm  LA Volume Index:   10.9 ml/m2    RA VOLUME BY A/L METHOD:                               Normal Ranges:  RA Vol A4C:        13.6 ml   (8.3-19.5ml)  RA Vol Index A4C:  9.0 ml/m2  RA Area A4C:       7.6 cm2  RA Major Axis A4C: 3.6 cm    AORTA MEASUREMENTS:                     Normal Ranges:  Asc Ao, d: 2.99 cm (2.1-3.4cm)    LV SYSTOLIC FUNCTION BY 2D PLANIMETRY (MOD):                       Normal Ranges:  EF-A4C View:    73 % (>=55%)  EF-A2C View:    68 %  EF-Biplane:     70 %  EF-Visual:      68 %  LV EF Reported: 68 %    LV DIASTOLIC FUNCTION:                          Normal Ranges:  MV Peak E:    0.59 m/s  (0.7-1.2 m/s)  MV Peak A:    0.62 m/s  (0.42-0.7 m/s)  E/A Ratio:    0.95      (1.0-2.2)  MV e'         0.058 m/s (>8.0)  MV lateral e' 0.06 m/s  MV medial e'  0.06 m/s  E/e' Ratio:   10.15     (<8.0)    MITRAL VALVE:                  Normal Ranges:  MV DT: 227 msec (150-240msec)    AORTIC VALVE:                                     Normal Ranges:  AoV Vmax:                1.80 m/s  (<=1.7m/s)  AoV Peak P.0 mmHg (<20mmHg)  AoV Mean P.0 mmHg  (1.7-11.5mmHg)  LVOT Max Keo:            1.46 m/s  (<=1.1m/s)  AoV VTI:                 26.50 cm  (18-25cm)  LVOT VTI:                23.30 cm  LVOT Diameter:           1.95 cm   (1.8-2.4cm)  AoV Area, VTI:           2.63 cm2  (2.5-5.5cm2)  AoV Area,Vmax:           2.42 cm2  (2.5-4.5cm2)  AoV Dimensionless Index: 0.88       RIGHT VENTRICLE:  RV Basal 2.41 cm  RV Mid   2.35 cm  RV Major 6.0 cm  TAPSE:   16.0 mm  RV s'    0.14 m/s    TRICUSPID VALVE/RVSP:                              Normal Ranges:  Peak TR Velocity: 1.60 m/s  RV Syst Pressure: 13.2 mmHg (< 30mmHg)    PULMONIC VALVE:                          Normal Ranges:  PV Accel Time: 92 msec  (>120ms)  PV Max Keo:    1.3 m/s  (0.6-0.9m/s)  PV Max P.1 mmHg       [id number] Nathan Lee MD, FACC  Electronically signed on  7/6/2024 at 11:59:25 AM       ** Final **      Physical Exam  General: No distress  Neck: Supple.  HEENT: Normocephalic atraumatic pupils are clear.  Heart: S1-S2 heard no murmurs gallops regurgitation  Lungs: Clear to auscultation bilaterally no wheezing rales or rhonchi.  Neuro: No focal deficits    Relevant Results  Transthoracic Echo (TTE) Complete    Result Date: 7/6/2024          [place] [id number]  Tel [phone] Fax [phone] TRANSTHORACIC ECHOCARDIOGRAM REPORT Patient Name:      VICTOR M JERONIMO           [place] Physician:    [id number] Nathan Lee MD, [facility] Study Date:        7/6/2024              Ordering Provider:    [id number] JOHN GONZALEZ MRN/PID:           [id number]              Fellow: Accession#:        [id number]          Nurse: Date of Birth/Age: 1950 / 74 years   Sonographer:          Jenn Tinoco RDCS Gender:            F                     Additional Staff: Height:            149.86 cm             Admit Date:           7/5/2024 Weight:            57.61 kg              Admission Status:     Inpatient -                                                                Routine BSA / BMI:         1.52 m2 / 25.65 kg/m2 Department Location:  [address] [place] [address] Blood Pressure: 134 /71 mmHg Study Type:    TRANSTHORACIC ECHO (TTE) COMPLETE Diagnosis/ICD: Other transient cerebral ischemic attacks and related                syndromes-G45.8 Indication:    Transischemic Attack CPT Codes:     Echo Complete w Full Doppler-[id number] Patient History: Pertinent History: TIA, HTN and Hyperlipidemia. Study Detail: The following Echo studies were performed: M-Mode, 2D, Doppler and               color flow. Technically challenging  study due to body habitus.               Agitated saline used as a contrast agent for intraseptal flow               evaluation and Definity used as a contrast agent for endocardial               border definition. Total contrast used for this procedure was 4 mL               via IV push.  PHYSICIAN INTERPRETATION: Left Ventricle: The left ventricular systolic function is normal, with a visually estimated ejection fraction of 65-70%. There are no regional wall motion abnormalities. The left ventricular cavity size is normal. The left ventricular septal wall thickness is moderately increased. There is moderately increased left ventricular posterior wall thickness. Spectral Doppler shows an impaired relaxation pattern of left ventricular diastolic filling. Left Atrium: The left atrium is normal in size. A bubble study using agitated saline was performed. Bubble study is positive. A moderate PFO (11-20 bubbles) was demonstrated. Left atrial volume index is 11.2 ml/m2. Right Ventricle: The right ventricle is normal in size. There is mildly increased right ventriclar wall thickness. There is normal right ventricular global systolic function. Normal right ventricular chamber size and function. Right Atrium: The right atrium is normal in size. Aortic Valve: The aortic valve is trileaflet. The aortic valve dimensionless index is 0.88. There is no evidence of aortic valve regurgitation. The peak instantaneous gradient of the aortic valve is 13.0 mmHg. The mean gradient of the aortic valve is 6.0 mmHg. Mitral Valve: The mitral valve is normal in structure. There is mild mitral valve regurgitation. Mild (+) mitral regurgitation. Tricuspid Valve: The tricuspid valve is structurally normal. No evidence of tricuspid regurgitation. Trivial tricuspid regurgitation with estimated RVSP 13 mmHg. Pulmonic Valve: The pulmonic valve is structurally normal. There is no indication of pulmonic valve regurgitation. Pericardium: There is no  pericardial effusion noted. Aorta: The aortic root is normal. Systemic Veins: The inferior vena cava was not well visualized.  CONCLUSIONS:  1. The left ventricular systolic function is normal, with a visually estimated ejection fraction of 65-70%.  2. Spectral Doppler shows an impaired relaxation pattern of left ventricular diastolic filling.  3. Moderately increased left ventricular septal thickness.  4. The left ventricular posterior wall thickness is moderately increased.  5. There is normal right ventricular global systolic function.  6. Mild (+) mitral regurgitation.  7. Trivial tricuspid regurgitation with estimated RVSP 13 mmHg.  8. No previous available for comparison.  9. A bubble study using agitated saline was performed. Bubble study is positive. A moderate PFO (11-20 bubbles) was demonstrated. QUANTITATIVE DATA SUMMARY: 2D MEASUREMENTS:                           Normal Ranges: Ao Root d:     2.13 cm    (2.0-3.7cm) LAs:           3.46 cm    (2.7-4.0cm) IVSd:          1.37 cm    (0.6-1.1cm) LVPWd:         1.38 cm    (0.6-1.1cm) LVIDd:         3.82 cm    (3.9-5.9cm) LVIDs:         2.40 cm LV Mass Index: 144.9 g/m2 LV % FS        37.4 % LA VOLUME:                               Normal Ranges: LA Vol A4C:        17.0 ml    (22+/-6mL/m2) LA Vol A2C:        16.7 ml LA Vol BP:         17.3 ml LA Vol Index A4C:  11.2ml/m2 LA Vol Index A2C:  11.0 ml/m2 LA Vol Index BP:   11.4 ml/m2 LA Area A4C:       8.6 cm2 LA Area A2C:       8.3 cm2 LA Major Axis A4C: 3.7 cm LA Major Axis A2C: 3.5 cm LA Volume Index:   10.9 ml/m2 RA VOLUME BY A/L METHOD:                              Normal Ranges: RA Vol A4C:        13.6 ml   (8.3-19.5ml) RA Vol Index A4C:  9.0 ml/m2 RA Area A4C:       7.6 cm2 RA Major Axis A4C: 3.6 cm AORTA MEASUREMENTS:                    Normal Ranges: Asc Ao, d: 2.99 cm (2.1-3.4cm) LV SYSTOLIC FUNCTION BY 2D PLANIMETRY (MOD):                      Normal Ranges: EF-A4C View:    73 % (>=55%) EF-A2C View:    68  % EF-Biplane:     70 % EF-Visual:      68 % LV EF Reported: 68 % LV DIASTOLIC FUNCTION:                         Normal Ranges: MV Peak E:    0.59 m/s  (0.7-1.2 m/s) MV Peak A:    0.62 m/s  (0.42-0.7 m/s) E/A Ratio:    0.95      (1.0-2.2) MV e'         0.058 m/s (>8.0) MV lateral e' 0.06 m/s MV medial e'  0.06 m/s E/e' Ratio:   10.15     (<8.0) MITRAL VALVE:                 Normal Ranges: MV DT: 227 msec (150-240msec) AORTIC VALVE:                                    Normal Ranges: AoV Vmax:                1.80 m/s  (<=1.7m/s) AoV Peak P.0 mmHg (<20mmHg) AoV Mean P.0 mmHg  (1.7-11.5mmHg) LVOT Max Keo:            1.46 m/s  (<=1.1m/s) AoV VTI:                 26.50 cm  (18-25cm) LVOT VTI:                23.30 cm LVOT Diameter:           1.95 cm   (1.8-2.4cm) AoV Area, VTI:           2.63 cm2  (2.5-5.5cm2) AoV Area,Vmax:           2.42 cm2  (2.5-4.5cm2) AoV Dimensionless Index: 0.88  RIGHT VENTRICLE: RV Basal 2.41 cm RV Mid   2.35 cm RV Major 6.0 cm TAPSE:   16.0 mm RV s'    0.14 m/s TRICUSPID VALVE/RVSP:                             Normal Ranges: Peak TR Velocity: 1.60 m/s RV Syst Pressure: 13.2 mmHg (< 30mmHg) PULMONIC VALVE:                         Normal Ranges: PV Accel Time: 92 msec  (>120ms) PV Max Keo:    1.3 m/s  (0.6-0.9m/s) PV Max P.1 mmHg  [id number] Nathan Lee MD, FACC Electronically signed on 2024 at 11:59:25 AM  ** Final **     MR brain wo IV contrast    Result Date: 2024  Interpreted By:  Gareth Rossi, STUDY: MR BRAIN WO IV CONTRAST   INDICATION: Signs/Symptoms:TIA   COMPARISON: Head CT 2024.   ACCESSION NUMBER(S): [id number]   ORDERING CLINICIAN: JOHN GONZALEZ   TECHNIQUE: Multi-planar multi-sequential MR imaging of the brain was performed without intravenous contrast.   FINDINGS:   No acute infarction, intracranial hemorrhage or mass. Moderate microangiopathic disease.   No hydrocephalus.  No extra-axial fluid collections.   The skull base flow  voids are present.   The visualized intraorbital contents are normal.   Mild mucosal disease of the ethmoid air cells. Trace left mastoid effusion.   The visualized osseous structures, soft tissues and partially visualized parotid glands appear normal.       No acute intracranial abnormality. Moderate microangiopathic disease.   Signed by: Gareth Rossi 7/5/2024 6:19 PM Dictation workstation:   [id number]    CT angio head and neck w and wo IV contrast    Result Date: 7/5/2024  Interpreted By:  Jimenez Martinez, STUDY: CT ANGIO HEAD AND NECK W AND WO IV CONTRAST performed 7/5/2024   INDICATION: Signs/Symptoms:HA, c/f dissection   COMPARISON: CT head dated 07/05/2024.   ACCESSION NUMBER(S): [id number]   ORDERING CLINICIAN: MATHEW CULLEN   TECHNIQUE: Axial CTA imaging was obtained through the head and neck following the administration of 75 ML Omnipaque 350 intravenous contrast. Coronal, sagittal, MIP, and 3D reconstructions were obtained. 3D reconstructions were rendered using a separate 3D workstation.   Motion artifact degrades assessment.   FINDINGS: NECK:   No suspicious lymphadenopathy. Patent airway. Salivary glands are unremarkable. Multinodular thyroid gland with dominant 2.0 cm nodule involving the inferior aspect of the left lobe of the thyroid gland. No definite acute osseous abnormality of the cervical spine. Moderate to advanced cervical spondylosis. Mild biapical pleuroparenchymal scarring.   VASCULAR FINDINGS:   Aorta and subclavian arteries:Brachiocephalic and left common carotid artery share a common origin, an anatomic variant. Subclavian arteries are patent without flow significant stenosis.   Common carotid arteries: Allowing for motion artifact, patent bilaterally without focal stenosis.   External carotid arteries: Patent bilaterally.   Internal carotid arteries: Patent bilaterally. There is small calcified atherosclerotic plaque of the left carotid bifurcation/left internal carotid artery  origin. There is no NASCET stenosis bilaterally. There is also trace atherosclerotic involvement of the parasellar intracranial internal carotid artery segments bilaterally without stenosis or definite aneurysm.   Anterior cerebral arteries: Right A1 segment is hypoplastic versus absent. Otherwise expected enhancement of the ACAs bilaterally.   Middle cerebral arteries: Patent appearing bilaterally. There is moderate stenosis involving the right MCA distal M1 and extending to the M2 segment origins (series 401, image 513 and series 404, image 84, for example). The left MCA appears patent without proximal stenosis.   Vertebral arteries: Patent flow signal bilaterally. Mild tortuosity of the V1 segments bilaterally. Otherwise normal enhancement.   Basilar artery: Normal.   Posterior communicating arteries: Visualized on the left, not well visualized on the right.   Posterior cerebral arteries: Near fetal origin of the left PCA. Normal bilaterally.       Motion artifact mildly degrades assessment. No evidence of source vessel arterial occlusion within the head and neck.   There is concern for moderate stenosis involving the right MCA distal M1 segment extending to the proximal M2 branches. Suspected hypoplastic versus absent right A1 segment. Otherwise no additional flow significant stenosis within the head and neck identified.   No evidence of dissection. No NASCET stenosis of the internal carotid artery origins.   2.0 cm diameter nodule suggested within the inferior aspect of the left lobe of the thyroid gland; recommend nonemergent ultrasound for further characterization.   MACRO: Incidental Finding:  There are few small hypoattenuating nodules measuring equal to or greater than 1.5 cm in the thyroid gland. (**-YCF-**)   Instructions:  Further evaluation with nonemergent thyroid ultrasound. (Managing Incidental Thyroid Nodules Detected on Imaging: White Paper of the ACR Incidental Thyroid Findings Committee. Zaki  Ora GARCIA et al. Journal of the American College of Radiology,Volume 12, Issue 2, 143  150.) [email or web address]   Signed by: Jimenez Martinez 7/5/2024 10:04 AM Dictation workstation:   [id number]    ECG 12 lead    Result Date: 7/5/2024  Normal sinus rhythm Possible Left atrial enlargement Left ventricular hypertrophy Cannot rule out Septal infarct (cited on or before 05-JUL-2024) ST & Marked T wave abnormality, consider lateral ischemia Abnormal ECG When compared with ECG of 05-JUL-2024 07:51, (unconfirmed) T wave inversion no longer evident in Inferior leads See ED provider note for full interpretation and clinical correlation    XR chest 1 view    Result Date: 7/5/2024  STUDY: Chest Radiograph;  7/5/2024 7:18AM INDICATION: Chest pain. COMPARISON: None Available. ACCESSION NUMBER(S): [id number] ORDERING CLINICIAN: MATHEW CULLEN TECHNIQUE:  Frontal chest was obtained at 8:18 hours. FINDINGS: CARDIOMEDIASTINAL SILHOUETTE: Cardiomediastinal silhouette is normal in size and configuration.  LUNGS: Lungs are clear.  ABDOMEN: No remarkable upper abdominal findings.  BONES: No acute osseous changes.    No radiographic evidence of acute cardiopulmonary disease. Signed by Raphael Nelson MD    CT head wo IV contrast    Result Date: 7/5/2024  Interpreted By:  Nicole Kapoor, STUDY: CT HEAD WO IV CONTRAST;  7/5/2024 7:48 am   INDICATION: Signs/Symptoms:R side headache, transient slurred speech.   COMPARISON: 01/20/2015   ACCESSION NUMBER(S): [id number]   ORDERING CLINICIAN: MATHEW CULLEN   TECHNIQUE: Noncontrast axial CT scan of head was performed. Angled reformats in brain and bone windows and coronal and sagittal reformats in brain window were generated.   FINDINGS: CSF Spaces: The ventricles, sulci and basal cisterns are within normal limits. There is no extraaxial fluid collection.   Parenchyma:  The grey-white differentiation is intact. There is no mass effect or midline shift.  There is no intracranial hemorrhage.    Calvarium: The calvarium is unremarkable.   Paranasal sinuses and mastoids: Mild mucoperiosteal thickening is noted in ethmoid air cells. Paranasal sinuses and mastoid air cells are otherwise clear.       No evidence of acute cortical infarct or intracranial hemorrhage.   No evidence of intracranial hemorrhage or displaced skull fracture.   MACRO: None.   Signed by: Nicole Kapoor 7/5/2024 8:20 AM Dictation workstation:   [id number]    ECG 12 lead    Result Date: 7/5/2024  Normal sinus rhythm Possible Left atrial enlargement Left ventricular hypertrophy Cannot rule out Septal infarct (cited on or before 05-JUL-2024) ST & Marked T wave abnormality, consider inferolateral ischemia Abnormal ECG When compared with ECG of 15-JUL-2022 13:25, Previous ECG has undetermined rhythm, needs review Serial changes of Septal infarct Present See ED provider note for full interpretation and clinical correlation     Transthoracic Echo (TTE) Complete    Result Date: 7/6/2024          [place] [id number]  Tel [phone] Fax [phone] TRANSTHORACIC ECHOCARDIOGRAM REPORT Patient Name:      VICTOR M Casanova Physician:    [id number] Nathan Lee MD, [facility] Study Date:        7/6/2024              Ordering Provider:    [id number] JOHN GONZALEZ MRN/PID:           [id number]              Fellow: Accession#:        [id number]          Nurse: Date of Birth/Age: 1950 / 74 years   Sonographer:          Jenn Tinoco RDCS Gender:            F                     Additional Staff: Height:            149.86 cm             Admit Date:           7/5/2024 Weight:            57.61 kg              Admission Status:     Inpatient -                                                                 Routine BSA / BMI:         1.52 m2 / 25.65 kg/m2 Department Location:  [address] [place] [address] Blood Pressure: 134 /71 mmHg Study Type:    TRANSTHORACIC ECHO (TTE) COMPLETE Diagnosis/ICD: 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 Study Detail: The following Echo studies were performed: M-Mode, 2D, Doppler and               color flow. Technically challenging study due to body habitus.               Agitated saline used as a contrast agent for intraseptal flow               evaluation and Definity used as a contrast agent for endocardial               border definition. Total contrast used for this procedure was 4 mL               via IV push.  PHYSICIAN INTERPRETATION: Left Ventricle: The left ventricular systolic function is normal, with a visually estimated ejection fraction of 65-70%. There are no regional wall motion abnormalities. The left ventricular cavity size is normal. The left ventricular septal wall thickness is moderately increased. There is moderately increased left ventricular posterior wall thickness. Spectral Doppler shows an impaired relaxation pattern of left ventricular diastolic filling. Left Atrium: The left atrium is normal in size. A bubble study using agitated saline was performed. Bubble study is positive. A moderate PFO (11-20 bubbles) was demonstrated. Left atrial volume index is 11.2 ml/m2. Right Ventricle: The right ventricle is normal in size. There is mildly increased right ventriclar wall thickness. There is normal right ventricular global systolic function. Normal right ventricular chamber size and function. Right Atrium: The right atrium is normal in size. Aortic Valve: The aortic valve is trileaflet. The aortic valve dimensionless index is 0.88. There is  no evidence of aortic valve regurgitation. The peak instantaneous gradient of the aortic valve is 13.0 mmHg. The mean gradient of the aortic valve is 6.0 mmHg. Mitral Valve: The mitral valve is normal in structure. There is mild mitral valve regurgitation. Mild (+) mitral regurgitation. Tricuspid Valve: The tricuspid valve is structurally normal. No evidence of tricuspid regurgitation. Trivial tricuspid regurgitation with estimated RVSP 13 mmHg. Pulmonic Valve: The pulmonic valve is structurally normal. There is no indication of pulmonic valve regurgitation. Pericardium: There is no pericardial effusion noted. Aorta: The aortic root is normal. Systemic Veins: The inferior vena cava was not well visualized.  CONCLUSIONS:  1. The left ventricular systolic function is normal, with a visually estimated ejection fraction of 65-70%.  2. Spectral Doppler shows an impaired relaxation pattern of left ventricular diastolic filling.  3. Moderately increased left ventricular septal thickness.  4. The left ventricular posterior wall thickness is moderately increased.  5. There is normal right ventricular global systolic function.  6. Mild (+) mitral regurgitation.  7. Trivial tricuspid regurgitation with estimated RVSP 13 mmHg.  8. No previous available for comparison.  9. A bubble study using agitated saline was performed. Bubble study is positive. A moderate PFO (11-20 bubbles) was demonstrated. QUANTITATIVE DATA SUMMARY: 2D MEASUREMENTS:                           Normal Ranges: Ao Root d:     2.13 cm    (2.0-3.7cm) LAs:           3.46 cm    (2.7-4.0cm) IVSd:          1.37 cm    (0.6-1.1cm) LVPWd:         1.38 cm    (0.6-1.1cm) LVIDd:         3.82 cm    (3.9-5.9cm) LVIDs:         2.40 cm LV Mass Index: 144.9 g/m2 LV % FS        37.4 % LA VOLUME:                               Normal Ranges: LA Vol A4C:        17.0 ml    (22+/-6mL/m2) LA Vol A2C:        16.7 ml LA Vol BP:         17.3 ml LA Vol Index A4C:  11.2ml/m2 LA Vol Index  A2C:  11.0 ml/m2 LA Vol Index BP:   11.4 ml/m2 LA Area A4C:       8.6 cm2 LA Area A2C:       8.3 cm2 LA Major Axis A4C: 3.7 cm LA Major Axis A2C: 3.5 cm LA Volume Index:   10.9 ml/m2 RA VOLUME BY A/L METHOD:                              Normal Ranges: RA Vol A4C:        13.6 ml   (8.3-19.5ml) RA Vol Index A4C:  9.0 ml/m2 RA Area A4C:       7.6 cm2 RA Major Axis A4C: 3.6 cm AORTA MEASUREMENTS:                    Normal Ranges: Asc Ao, d: 2.99 cm (2.1-3.4cm) LV SYSTOLIC FUNCTION BY 2D PLANIMETRY (MOD):                      Normal Ranges: EF-A4C View:    73 % (>=55%) EF-A2C View:    68 % EF-Biplane:     70 % EF-Visual:      68 % LV EF Reported: 68 % LV DIASTOLIC FUNCTION:                         Normal Ranges: MV Peak E:    0.59 m/s  (0.7-1.2 m/s) MV Peak A:    0.62 m/s  (0.42-0.7 m/s) E/A Ratio:    0.95      (1.0-2.2) MV e'         0.058 m/s (>8.0) MV lateral e' 0.06 m/s MV medial e'  0.06 m/s E/e' Ratio:   10.15     (<8.0) MITRAL VALVE:                 Normal Ranges: MV DT: 227 msec (150-240msec) AORTIC VALVE:                                    Normal Ranges: AoV Vmax:                1.80 m/s  (<=1.7m/s) AoV Peak P.0 mmHg (<20mmHg) AoV Mean P.0 mmHg  (1.7-11.5mmHg) LVOT Max Keo:            1.46 m/s  (<=1.1m/s) AoV VTI:                 26.50 cm  (18-25cm) LVOT VTI:                23.30 cm LVOT Diameter:           1.95 cm   (1.8-2.4cm) AoV Area, VTI:           2.63 cm2  (2.5-5.5cm2) AoV Area,Vmax:           2.42 cm2  (2.5-4.5cm2) AoV Dimensionless Index: 0.88  RIGHT VENTRICLE: RV Basal 2.41 cm RV Mid   2.35 cm RV Major 6.0 cm TAPSE:   16.0 mm RV s'    0.14 m/s TRICUSPID VALVE/RVSP:                             Normal Ranges: Peak TR Velocity: 1.60 m/s RV Syst Pressure: 13.2 mmHg (< 30mmHg) PULMONIC VALVE:                         Normal Ranges: PV Accel Time: 92 msec  (>120ms) PV Max Keo:    1.3 m/s  (0.6-0.9m/s) PV Max P.1 mmHg  [id number] Nathan Lee MD, FACC Electronically  signed on 7/6/2024 at 11:59:25 AM  ** Final **           Assessment/Plan   TIA.  Hypertension  T wave changes on the EKG.  Narrowing of right MCA.  Moderate sized PFO identified on bubble study.    History of:  Hypertension  Hyperlipidemia.    Plan:  Patient's symptoms resolved, no focal deficits  She is alert and oriented x 3.  Continue aspirin Plavix and statins.  MRI brain results reviewed-no acute stroke.  Echocardiogram showed bubble study positive with a moderate-sized PFO.  Discussed with neurology, ordered bilateral lower extremity to rule out DVT.  Cardiology consult placed we will follow-up with recommendations.  Blood pressure is fairly well-controlled with the current regimen.  Follow-up CBC BMP ordered  PT OT evaluation.    DVT prophylaxis:  Patient is on Lovenox    Disposition:  Awaiting bilateral lower extremity venous Dopplers.  Cardiology eval  Possible discharge home in next 24 hours      Wilfred Henderson MD

## 2024-07-07 ENCOUNTER — APPOINTMENT (OUTPATIENT)
Dept: CARDIOLOGY | Facility: HOSPITAL | Age: 74
End: 2024-07-07
Payer: COMMERCIAL

## 2024-07-07 VITALS
WEIGHT: 128 LBS | BODY MASS INDEX: 25.8 KG/M2 | HEIGHT: 59 IN | SYSTOLIC BLOOD PRESSURE: 169 MMHG | HEART RATE: 76 BPM | DIASTOLIC BLOOD PRESSURE: 80 MMHG | OXYGEN SATURATION: 96 % | RESPIRATION RATE: 18 BRPM | TEMPERATURE: 96.3 F

## 2024-07-07 LAB
ATRIAL RATE: 66 BPM
ATRIAL RATE: 70 BPM
GLUCOSE BLD MANUAL STRIP-MCNC: 96 MG/DL (ref 74–99)
P AXIS: 62 DEGREES
P AXIS: 71 DEGREES
P OFFSET: 201 MS
P OFFSET: 204 MS
P ONSET: 151 MS
P ONSET: 153 MS
PR INTERVAL: 146 MS
PR INTERVAL: 150 MS
Q ONSET: 226 MS
Q ONSET: 226 MS
QRS COUNT: 11 BEATS
QRS COUNT: 12 BEATS
QRS DURATION: 92 MS
QRS DURATION: 96 MS
QT INTERVAL: 410 MS
QT INTERVAL: 432 MS
QTC CALCULATION(BAZETT): 442 MS
QTC CALCULATION(BAZETT): 452 MS
QTC FREDERICIA: 431 MS
QTC FREDERICIA: 446 MS
R AXIS: 12 DEGREES
R AXIS: 20 DEGREES
T AXIS: 70 DEGREES
T AXIS: 76 DEGREES
T OFFSET: 431 MS
T OFFSET: 442 MS
VENTRICULAR RATE: 66 BPM
VENTRICULAR RATE: 70 BPM

## 2024-07-07 PROCEDURE — 82947 ASSAY GLUCOSE BLOOD QUANT: CPT | Mod: 91

## 2024-07-07 PROCEDURE — G0378 HOSPITAL OBSERVATION PER HR: HCPCS

## 2024-07-07 PROCEDURE — 2500000001 HC RX 250 WO HCPCS SELF ADMINISTERED DRUGS (ALT 637 FOR MEDICARE OP): Performed by: INTERNAL MEDICINE

## 2024-07-07 PROCEDURE — 99239 HOSP IP/OBS DSCHRG MGMT >30: CPT | Performed by: INTERNAL MEDICINE

## 2024-07-07 PROCEDURE — 99223 1ST HOSP IP/OBS HIGH 75: CPT | Performed by: INTERNAL MEDICINE

## 2024-07-07 RX ORDER — ATORVASTATIN CALCIUM 40 MG/1
40 TABLET, FILM COATED ORAL NIGHTLY
Qty: 30 TABLET | Refills: 0 | Status: SHIPPED | OUTPATIENT
Start: 2024-07-07 | End: 2024-07-12 | Stop reason: HOSPADM

## 2024-07-07 RX ORDER — CLOPIDOGREL BISULFATE 75 MG/1
75 TABLET ORAL DAILY
Qty: 30 TABLET | Refills: 1 | Status: SHIPPED | OUTPATIENT
Start: 2024-07-08 | End: 2024-07-12 | Stop reason: HOSPADM

## 2024-07-07 RX ORDER — ATORVASTATIN CALCIUM 40 MG/1
40 TABLET, FILM COATED ORAL NIGHTLY
Qty: 30 TABLET | Refills: 0 | Status: SHIPPED | OUTPATIENT
Start: 2024-07-07 | End: 2024-07-07

## 2024-07-07 RX ORDER — CLOPIDOGREL BISULFATE 75 MG/1
75 TABLET ORAL DAILY
Qty: 30 TABLET | Refills: 1 | Status: SHIPPED | OUTPATIENT
Start: 2024-07-08 | End: 2024-07-07

## 2024-07-07 ASSESSMENT — COGNITIVE AND FUNCTIONAL STATUS - GENERAL
MOBILITY SCORE: 24
DAILY ACTIVITIY SCORE: 24

## 2024-07-07 ASSESSMENT — PAIN SCALES - GENERAL
PAINLEVEL_OUTOF10: 2
PAINLEVEL_OUTOF10: 9

## 2024-07-07 ASSESSMENT — PAIN - FUNCTIONAL ASSESSMENT: PAIN_FUNCTIONAL_ASSESSMENT: 0-10

## 2024-07-07 NOTE — DISCHARGE SUMMARY
Discharge Diagnosis  TIA (transient ischemic attack)  TIA.  Hypertension  T wave changes on the EKG.  Narrowing of right MCA.  Moderate sized PFO identified on bubble study.     History of:  Hypertension  Hyperlipidemia.    Issues Requiring Follow-Up  Patient need to follow-up with cardiology for newly diagnosed moderate-sized PFO    Discharge Meds     Your medication list        START taking these medications        Instructions Last Dose Given Next Dose Due   clopidogrel 75 mg tablet  Commonly known as: Plavix  Start taking on: July 8, 2024      Take 1 tablet (75 mg) by mouth once daily. Do not fill before July 8, 2024.              CHANGE how you take these medications        Instructions Last Dose Given Next Dose Due   atorvastatin 40 mg tablet  Commonly known as: Lipitor  What changed:   medication strength  how much to take  when to take this      Take 1 tablet (40 mg) by mouth once daily at bedtime.              CONTINUE taking these medications        Instructions Last Dose Given Next Dose Due   amLODIPine 10 mg tablet  Commonly known as: Norvasc           aspirin 81 mg EC tablet           atenolol 100 mg tablet  Commonly known as: Tenormin           cholecalciferol 50,000 unit capsule  Commonly known as: Vitamin D-3           diclofenac sodium 1 % gel  Commonly known as: Voltaren           losartan-hydrochlorothiazide 100-25 mg tablet  Commonly known as: Hyzaar                     Where to Get Your Medications        These medications were sent to [id number] [address] AT [place] [id number]      Phone: [phone]   atorvastatin 40 mg tablet  clopidogrel 75 mg tablet         Test Results Pending At Discharge  Pending Labs       No current pending labs.            Hospital Course  Patient's symptoms resolved, no focal deficits  She is alert and oriented x 3.  Continue aspirin Plavix and statins.  MRI brain results reviewed-no acute  stroke.  Echocardiogram showed bubble study positive with a moderate-sized PFO.  Discussed with neurology, ordered bilateral lower extremity-ruled out a DVT.  Cardiology came and evaluated the patient recommended to continue dual antiplatelet therapy  As there are T wave changes on the EKG, recommended outpatient ischemic workup.  Cardiology also recommended a transesophageal echocardiogram for further evaluation to determine if PFO is large enough and suitable for percutaneous closure-but patient insisted on going home today, notified cardiology to get appointment for outpatient transesophageal echocardiogram.  Patient also need to follow-up with PFO clinic outpatient.  I got clearance from both neurology and cardiology.  Discharging patient home in stable condition.    Pertinent Physical Exam At Time of Discharge  Physical Exam  General: No distress  Neck: Supple.  HEENT: Normocephalic atraumatic pupils equal    Heart: S1-S2 heard.  Neuro: No focal deficits.    Outpatient Follow-Up  No future appointments.  Patient need to follow-up with the cardiology outpatient.      Wilfred Henderson MD

## 2024-07-07 NOTE — H&P (VIEW-ONLY)
Inpatient consult to Cardiology  Consult performed by: Smith Vasquez MD  Consult ordered by: Wilfred SANTAMARIA MD  Reason for consult: PFO on echocardiogram      Cardiology Consult Note      Date:   7/7/2024  Patient name:  Moon Martinez  Date of admission:  7/5/2024  7:27 AM  MRN:   [id number]  YOB: 1950  Time of Consult:  9:46 AM    Consulting Cardiologist: Dr. Smith Vasquez          Admission Diagnosis:     TIA (transient ischemic attack)      History of Present Illness:      Moon Martinez is a 74 y.o.  female patient With a history of hypertension who presented with left-sided weakness, associated with speech difficulty and clumsiness.  She had a persistent headache for about 2 days prior to the presentation and CT angio shows right MCA M1 segment occlusion with moderate stenosis.  She was not candidate for TNK or surgical intervention and cardiology was consulted for findings of PFO on an echocardiogram done this admission.  She had a longstanding history of prior TIA which occurred about 15 to 20 years ago that presented with severe weakness.  She denied any chest pain or significant shortness of breath with her day-to-day activities.    EKG shows sinus rhythm with LVH and repolarization changes with deep T wave inversion in the lateral leads.  This is unchanged when compared to prior EKG from July 15, 2022  Echo as noted above shows normal LV function with moderate LVH.  Moderate PFO was also demonstrated on bubble study. Troponin is negative measuring 4-5 and hide BNP is also normal at 19    Past Medical History:     Past Medical History:   Diagnosis Date    Personal history of other diseases of the circulatory system     History of hypertension    Personal history of other endocrine, nutritional and metabolic disease     History of hypercholesterolemia       Past Surgical History:     Past Surgical History:   Procedure Laterality Date    LITHOTRIPSY  03/24/2015    Renal  Lithotripsy    OTHER SURGICAL HISTORY  03/24/2015    Cystoscopy With Insertion Of Ureteral Stent    OTHER SURGICAL HISTORY  03/24/2015    Abdominal Surgery       Family History:     No family history on file.    Social History:     Social History     Tobacco Use    Smoking status: Never    Smokeless tobacco: Never       Allergies:     Allergies   Allergen Reactions    Hydralazine Anaphylaxis    Hydrochlorothiazide Other    Lisinopril GI Upset and Other     Other reaction(s): GI Upset, GI Upset    Milk GI Upset     diarrhea and gas from milk    Morphine Hives       CURRENT HOSPITAL MEDICATIONS    aspirin, 81 mg, oral, Daily  atorvastatin, 40 mg, oral, Nightly  clopidogrel, 75 mg, oral, Daily  oxygen, , inhalation, Continuous - 02/gases  perflutren protein A microsphere, 0.5 mL, intravenous, Once in imaging  polyethylene glycol, 17 g, oral, Daily  sulfur hexafluoride microsphr, 2 mL, intravenous, Once in imaging         Current Outpatient Medications   Medication Instructions    amLODIPine (NORVASC) 10 mg, oral, Daily    aspirin 81 mg, oral, Daily    atenolol (TENORMIN) 100 mg, oral, Daily    atorvastatin (LIPITOR) 10 mg, oral, Daily    cholecalciferol (VITAMIN D-3) 50,000 Units, oral, Once Weekly, Sunday     diclofenac sodium (VOLTAREN) 4 g, Topical, 4 times daily PRN    losartan-hydrochlorothiazide (Hyzaar) 100-25 mg tablet 1 tablet, oral, Daily        Review of Systems:      12 point review of systems was obtained in detail and is negative other than that detailed above.    Vital Signs:     Vitals:    07/07/24 0030 07/07/24 0421 07/07/24 0714 07/07/24 0807   BP: 151/78 (!) 161/99 169/80    BP Location:       Patient Position:       Pulse: 83 83 76    Resp:  18     Temp: 36.2 °C (97.2 °F) 36.4 °C (97.5 °F) 35.7 °C (96.3 °F)    TempSrc:       SpO2: 97% 94% 97% 96%   Weight:       Height:         No intake or output data in the 24 hours ending 07/07/24 0946    Wt Readings from Last 4 Encounters:   07/05/24 58.1 kg  "(128 lb)       Physical Examination:     GENERAL APPEARANCE: Well developed, well nourished, in no acute distress.  CHEST: Symmetric and non-tender.  INTEGUMENT: Skin warm and dry, without gross excoriationis or lesions.  HEENT: No gross abnormalities of conjunctiva, teeth, gums, oral mucosa  NECK: Supple, no JVD, no bruit. Thyroid not palpable. Carotid upstrokes normal.  NEURO/PSHCY: Alert and oriented x3; appropriate behavior and responses and responses, grossly normal cerebellar function with normal balance and coordination  LUNGS: Clear to auscultation bilaterally; normal respiratory effort.  HEART: Rate and rhythm regular with no evident murmur; no gallop appreciated. There are no rubs, clicks or heaves. PMI nondisplaced.  ABDOMEN: Soft, nontender, no palpable hepatosplenomegaly, no mases, no bruits. Abdominal aorta not noted to be enlarged.  MUSCULOSKELETAL: Ambulatory with normal tandem gait.  EXTREMITIES: Warm with good color, no clubbing or cyanois. There is no edema noted.  PERIPHERAL VASCULAR: Pulses present and equally palpable; 2+ throughout. No femoral bruits.      Lab:     CBC:   Lab Results   Component Value Date    WBC 5.6 07/06/2024    RBC 4.71 07/06/2024    HGB 13.5 07/06/2024    HCT 41.4 07/06/2024     07/06/2024        CMP:    Lab Results   Component Value Date     07/06/2024    K 4.4 07/06/2024     07/06/2024    CO2 26 07/06/2024    BUN 27 (H) 07/06/2024    CREATININE 1.02 07/06/2024    GLUCOSE 94 07/06/2024    CALCIUM 9.3 07/06/2024       Magnesium:    Lab Results   Component Value Date    MG 2.41 (H) 07/05/2024       Lipid Profile:    Lab Results   Component Value Date    TRIG 158 (H) 07/05/2024    HDL 42.1 07/05/2024    LDLCALC 85 07/05/2024       TSH:    No results found for: \"TSH\"    BNP:   Lab Results   Component Value Date    BNP 19 07/05/2024        PT/INR:    Lab Results   Component Value Date    PROTIME 10.7 07/05/2024    INR 1.0 07/05/2024       HgBA1c:    Lab " Results   Component Value Date    HGBA1C 5.8 (H) 07/05/2024       BMP:  Lab Results   Component Value Date     07/06/2024     07/05/2024    K 4.4 07/06/2024    K 4.5 07/05/2024     07/06/2024     07/05/2024    CO2 26 07/06/2024    CO2 26 07/05/2024    BUN 27 (H) 07/06/2024    BUN 25 (H) 07/05/2024    CREATININE 1.02 07/06/2024    CREATININE 1.03 07/05/2024       CBC:  Lab Results   Component Value Date    WBC 5.6 07/06/2024    WBC 5.7 07/05/2024    RBC 4.71 07/06/2024    RBC 4.49 07/05/2024    HGB 13.5 07/06/2024    HGB 12.8 07/05/2024    HCT 41.4 07/06/2024    HCT 39.4 07/05/2024    MCV 88 07/06/2024    MCV 88 07/05/2024    MCH 28.7 07/06/2024    MCH 28.5 07/05/2024    MCHC 32.6 07/06/2024    MCHC 32.5 07/05/2024    RDW 13.2 07/06/2024    RDW 13.2 07/05/2024     07/06/2024     07/05/2024       Cardiac Enzymes:    Lab Results   Component Value Date    TROPHS 5 07/05/2024    TROPHS 4 07/05/2024       Hepatic Function Panel:    Lab Results   Component Value Date    ALKPHOS 74 07/05/2024    ALT 14 07/05/2024    AST 17 07/05/2024    PROT 7.5 07/05/2024    BILITOT 0.4 07/05/2024       Diagnostic Studies:     Lower extremity venous duplex bilateral    Result Date: 7/6/2024  Interpreted By:  Schoenberger, Joseph, STUDY: [facility] US LOWER EXTREMITY VENOUS DUPLEX BILATERAL;  7/6/2024 3:13 pm   INDICATION: Signs/Symptoms:TIA, with PFO, r/o lower extremity DVT.   COMPARISON: None.   ACCESSION NUMBER(S): [id number]   ORDERING CLINICIAN: JOHN GONZALEZ   TECHNIQUE: Vascular ultrasound of the bilateral lower extremities was performed. Real-time compression views as well as Gray scale, color Doppler and spectral Doppler waveform analysis was performed.   FINDINGS: Evaluation of the visualized portions of the bilateral common femoral vein, proximal, mid, and distal femoral vein, and popliteal vein were performed.  Evaluation of the visualized portions of the  posterior tibial and peroneal veins  were also performed.   Limitations:  None   The evaluated veins demonstrate normal compressibility. There is intact venous flow demonstrating normal respiratory variability and normal augmentation of flow with calf compression. Therefore, there is no ultrasonographic evidence for deep vein thrombosis within the evaluated veins.   Respiratory variation and augmentation to calf pressure was noted.       No sonographic findings suggesting right or left lower extremity deep venous thrombosis.   MACRO: None   Signed by: Joseph Schoenberger 7/6/2024 3:26 PM Dictation workstation:   [id number]    Transthoracic Echo (TTE) Complete    Result Date: 7/6/2024          [place] [address]  Tel [phone] Fax [phone] TRANSTHORACIC ECHOCARDIOGRAM REPORT Patient Name:      VICTOR M Casanova Physician:    [id number] Nathan Lee MD, [facility] Study Date:        7/6/2024              Ordering Provider:    [id number] JOHN GONZALEZ MRN/PID:           [id number]              Fellow: Accession#:        [id number]          Nurse: Date of Birth/Age: 1950 / 74 years   Sonographer:          Jenn Tinoco RDCS Gender:            F                     Additional Staff: Height:            149.86 cm             Admit Date:           7/5/2024 Weight:            57.61 kg              Admission Status:     Inpatient -                                                                Routine BSA / BMI:         1.52 m2 / 25.65 kg/m2 Department Location:  [address] [place] [address] Blood Pressure: 134 /71 mmHg Study Type:    TRANSTHORACIC ECHO (TTE) COMPLETE Diagnosis/ICD: Other transient cerebral ischemic attacks and related                syndromes-G45.8  Indication:    Transischemic Attack CPT Codes:     Echo Complete w Full Doppler-[id number] Patient History: Pertinent History: TIA, HTN and Hyperlipidemia. Study Detail: The following Echo studies were performed: M-Mode, 2D, Doppler and               color flow. Technically challenging study due to body habitus.               Agitated saline used as a contrast agent for intraseptal flow               evaluation and Definity used as a contrast agent for endocardial               border definition. Total contrast used for this procedure was 4 mL               via IV push.  PHYSICIAN INTERPRETATION: Left Ventricle: The left ventricular systolic function is normal, with a visually estimated ejection fraction of 65-70%. There are no regional wall motion abnormalities. The left ventricular cavity size is normal. The left ventricular septal wall thickness is moderately increased. There is moderately increased left ventricular posterior wall thickness. Spectral Doppler shows an impaired relaxation pattern of left ventricular diastolic filling. Left Atrium: The left atrium is normal in size. A bubble study using agitated saline was performed. Bubble study is positive. A moderate PFO (11-20 bubbles) was demonstrated. Left atrial volume index is 11.2 ml/m2. Right Ventricle: The right ventricle is normal in size. There is mildly increased right ventriclar wall thickness. There is normal right ventricular global systolic function. Normal right ventricular chamber size and function. Right Atrium: The right atrium is normal in size. Aortic Valve: The aortic valve is trileaflet. The aortic valve dimensionless index is 0.88. There is no evidence of aortic valve regurgitation. The peak instantaneous gradient of the aortic valve is 13.0 mmHg. The mean gradient of the aortic valve is 6.0 mmHg. Mitral Valve: The mitral valve is normal in structure. There is mild mitral valve regurgitation. Mild (+) mitral regurgitation. Tricuspid Valve: The  tricuspid valve is structurally normal. No evidence of tricuspid regurgitation. Trivial tricuspid regurgitation with estimated RVSP 13 mmHg. Pulmonic Valve: The pulmonic valve is structurally normal. There is no indication of pulmonic valve regurgitation. Pericardium: There is no pericardial effusion noted. Aorta: The aortic root is normal. Systemic Veins: The inferior vena cava was not well visualized.      CONCLUSIONS:  1. The left ventricular systolic function is normal, with a visually estimated ejection fraction of 65-70%.  2. Spectral Doppler shows an impaired relaxation pattern of left ventricular diastolic filling.  3. Moderately increased left ventricular septal thickness.  4. The left ventricular posterior wall thickness is moderately increased.  5. There is normal right ventricular global systolic function.  6. Mild (+) mitral regurgitation.  7. Trivial tricuspid regurgitation with estimated RVSP 13 mmHg.  8. No previous available for comparison.  9. A bubble study using agitated saline was performed. Bubble study is positive. A moderate PFO (11-20 bubbles) was demonstrated.       ECG 12 lead    Result Date: 7/5/2024  Normal sinus rhythm Possible Left atrial enlargement Left ventricular hypertrophy Cannot rule out Septal infarct (cited on or before 05-JUL-2024) ST & Marked T wave abnormality, consider inferolateral ischemia Abnormal ECG When compared with ECG of 15-JUL-2022 13:25, Previous ECG has undetermined rhythm, needs review Serial changes of Septal infarct Present See ED provider note for full interpretation and clinical correlation      Radiology:     Lower extremity venous duplex bilateral   Final Result   No sonographic findings suggesting right or left lower extremity deep   venous thrombosis.        MACRO:   None        Signed by: Joseph Schoenberger 7/6/2024 3:26 PM   Dictation workstation:   [id number]      Transthoracic Echo (TTE) Complete   Final Result      MR brain wo IV contrast    Final Result   No acute intracranial abnormality. Moderate microangiopathic disease.        Signed by: Gareth Rossi 7/5/2024 6:19 PM   Dictation workstation:   [id number]      CT angio head and neck w and wo IV contrast   Final Result   Motion artifact mildly degrades assessment. No evidence of source   vessel arterial occlusion within the head and neck.        There is concern for moderate stenosis involving the right MCA distal   M1 segment extending to the proximal M2 branches. Suspected   hypoplastic versus absent right A1 segment. Otherwise no additional   flow significant stenosis within the head and neck identified.        No evidence of dissection. No NASCET stenosis of the internal carotid   artery origins.        2.0 cm diameter nodule suggested within the inferior aspect of the   left lobe of the thyroid gland; recommend nonemergent ultrasound for   further characterization.        MACRO:   Incidental Finding:  There are few small hypoattenuating nodules   measuring equal to or greater than 1.5 cm in the thyroid gland.   (**-YCF-**)        Instructions:  Further evaluation with nonemergent thyroid ultrasound.   (Managing Incidental Thyroid Nodules Detected on Imaging: White Paper   of the ACR Incidental Thyroid Findings Committee. Ora Haines. et   al. Journal of the American College of Radiology,Volume 12, Issue 2,   143 - 150.) [email or web address]        Signed by: Jimenez Martinez 7/5/2024 10:04 AM   Dictation workstation:   [id number]      XR chest 1 view   Final Result   No radiographic evidence of acute cardiopulmonary disease.   Signed by Raphael Nelson MD      CT head wo IV contrast   Final Result   No evidence of acute cortical infarct or intracranial hemorrhage.        No evidence of intracranial hemorrhage or displaced skull fracture.        MACRO:   None.        Signed by: Nicole Kapoor 7/5/2024 8:20 AM   Dictation workstation:   [id number]          Problem List:     Patient Active Problem  List   Diagnosis    TIA (transient ischemic attack)    Nonintractable headache, unspecified chronicity pattern, unspecified headache type       ASSESSMENT & PLAN:   1.  CVA: With a demonstrated infarct in the MCA territory as noted above and improved symptoms with resolution of slurred speech and arm numbness.  The patient had demonstrated PFO on the echocardiogram and in view of recurrent TIA, will recommend a KACIE for further evaluation to determine if the PFO is large enough and suitable for percutaneous closure.  In the meantime, continue with current antiplatelet therapy per neurology.  2.  Hypertension: Continue with current blood pressure medications and allowing for permissive hypertension in view of recent stroke, to maintain cerebral perfusion.  3.  Abnormal EKG: With deep and T wave inversion in the lateral leads as noted above, likely secondary to LVH with repolarization changes.  This is not new when compared to EKG from 2022 as noted above, but patient will benefit from elective ischemic evaluation as an outpatient to rule out any significant underlying coronary artery disease.        Thank you for the opportunity to participate in the care of your patient.  Please do not hesitate to call if you have any questions.    Electronically signed by Smith Vasquez MD, on 7/7/2024 at 9:46 AM

## 2024-07-07 NOTE — CONSULTS
Inpatient consult to Cardiology  Consult performed by: Smith Vasquez MD  Consult ordered by: Wilfred SANTAMARIA MD  Reason for consult: PFO on echocardiogram      Cardiology Consult Note      Date:   7/7/2024  Patient name:  Moon Martinez  Date of admission:  7/5/2024  7:27 AM  MRN:   [id number]  YOB: 1950  Time of Consult:  9:46 AM    Consulting Cardiologist: Dr. Smith Vasquez          Admission Diagnosis:     TIA (transient ischemic attack)      History of Present Illness:      Moon Martinez is a 74 y.o.  female patient With a history of hypertension who presented with left-sided weakness, associated with speech difficulty and clumsiness.  She had a persistent headache for about 2 days prior to the presentation and CT angio shows right MCA M1 segment occlusion with moderate stenosis.  She was not candidate for TNK or surgical intervention and cardiology was consulted for findings of PFO on an echocardiogram done this admission.  She had a longstanding history of prior TIA which occurred about 15 to 20 years ago that presented with severe weakness.  She denied any chest pain or significant shortness of breath with her day-to-day activities.    EKG shows sinus rhythm with LVH and repolarization changes with deep T wave inversion in the lateral leads.  This is unchanged when compared to prior EKG from July 15, 2022  Echo as noted above shows normal LV function with moderate LVH.  Moderate PFO was also demonstrated on bubble study. Troponin is negative measuring 4-5 and hide BNP is also normal at 19    Past Medical History:     Past Medical History:   Diagnosis Date    Personal history of other diseases of the circulatory system     History of hypertension    Personal history of other endocrine, nutritional and metabolic disease     History of hypercholesterolemia       Past Surgical History:     Past Surgical History:   Procedure Laterality Date    LITHOTRIPSY  03/24/2015    Renal  Lithotripsy    OTHER SURGICAL HISTORY  03/24/2015    Cystoscopy With Insertion Of Ureteral Stent    OTHER SURGICAL HISTORY  03/24/2015    Abdominal Surgery       Family History:     No family history on file.    Social History:     Social History     Tobacco Use    Smoking status: Never    Smokeless tobacco: Never       Allergies:     Allergies   Allergen Reactions    Hydralazine Anaphylaxis    Hydrochlorothiazide Other    Lisinopril GI Upset and Other     Other reaction(s): GI Upset, GI Upset    Milk GI Upset     diarrhea and gas from milk    Morphine Hives       CURRENT HOSPITAL MEDICATIONS    aspirin, 81 mg, oral, Daily  atorvastatin, 40 mg, oral, Nightly  clopidogrel, 75 mg, oral, Daily  oxygen, , inhalation, Continuous - 02/gases  perflutren protein A microsphere, 0.5 mL, intravenous, Once in imaging  polyethylene glycol, 17 g, oral, Daily  sulfur hexafluoride microsphr, 2 mL, intravenous, Once in imaging         Current Outpatient Medications   Medication Instructions    amLODIPine (NORVASC) 10 mg, oral, Daily    aspirin 81 mg, oral, Daily    atenolol (TENORMIN) 100 mg, oral, Daily    atorvastatin (LIPITOR) 10 mg, oral, Daily    cholecalciferol (VITAMIN D-3) 50,000 Units, oral, Once Weekly, Sunday     diclofenac sodium (VOLTAREN) 4 g, Topical, 4 times daily PRN    losartan-hydrochlorothiazide (Hyzaar) 100-25 mg tablet 1 tablet, oral, Daily        Review of Systems:      12 point review of systems was obtained in detail and is negative other than that detailed above.    Vital Signs:     Vitals:    07/07/24 0030 07/07/24 0421 07/07/24 0714 07/07/24 0807   BP: 151/78 (!) 161/99 169/80    BP Location:       Patient Position:       Pulse: 83 83 76    Resp:  18     Temp: 36.2 °C (97.2 °F) 36.4 °C (97.5 °F) 35.7 °C (96.3 °F)    TempSrc:       SpO2: 97% 94% 97% 96%   Weight:       Height:         No intake or output data in the 24 hours ending 07/07/24 0946    Wt Readings from Last 4 Encounters:   07/05/24 58.1 kg  "(128 lb)       Physical Examination:     GENERAL APPEARANCE: Well developed, well nourished, in no acute distress.  CHEST: Symmetric and non-tender.  INTEGUMENT: Skin warm and dry, without gross excoriationis or lesions.  HEENT: No gross abnormalities of conjunctiva, teeth, gums, oral mucosa  NECK: Supple, no JVD, no bruit. Thyroid not palpable. Carotid upstrokes normal.  NEURO/PSHCY: Alert and oriented x3; appropriate behavior and responses and responses, grossly normal cerebellar function with normal balance and coordination  LUNGS: Clear to auscultation bilaterally; normal respiratory effort.  HEART: Rate and rhythm regular with no evident murmur; no gallop appreciated. There are no rubs, clicks or heaves. PMI nondisplaced.  ABDOMEN: Soft, nontender, no palpable hepatosplenomegaly, no mases, no bruits. Abdominal aorta not noted to be enlarged.  MUSCULOSKELETAL: Ambulatory with normal tandem gait.  EXTREMITIES: Warm with good color, no clubbing or cyanois. There is no edema noted.  PERIPHERAL VASCULAR: Pulses present and equally palpable; 2+ throughout. No femoral bruits.      Lab:     CBC:   Lab Results   Component Value Date    WBC 5.6 07/06/2024    RBC 4.71 07/06/2024    HGB 13.5 07/06/2024    HCT 41.4 07/06/2024     07/06/2024        CMP:    Lab Results   Component Value Date     07/06/2024    K 4.4 07/06/2024     07/06/2024    CO2 26 07/06/2024    BUN 27 (H) 07/06/2024    CREATININE 1.02 07/06/2024    GLUCOSE 94 07/06/2024    CALCIUM 9.3 07/06/2024       Magnesium:    Lab Results   Component Value Date    MG 2.41 (H) 07/05/2024       Lipid Profile:    Lab Results   Component Value Date    TRIG 158 (H) 07/05/2024    HDL 42.1 07/05/2024    LDLCALC 85 07/05/2024       TSH:    No results found for: \"TSH\"    BNP:   Lab Results   Component Value Date    BNP 19 07/05/2024        PT/INR:    Lab Results   Component Value Date    PROTIME 10.7 07/05/2024    INR 1.0 07/05/2024       HgBA1c:    Lab " Results   Component Value Date    HGBA1C 5.8 (H) 07/05/2024       BMP:  Lab Results   Component Value Date     07/06/2024     07/05/2024    K 4.4 07/06/2024    K 4.5 07/05/2024     07/06/2024     07/05/2024    CO2 26 07/06/2024    CO2 26 07/05/2024    BUN 27 (H) 07/06/2024    BUN 25 (H) 07/05/2024    CREATININE 1.02 07/06/2024    CREATININE 1.03 07/05/2024       CBC:  Lab Results   Component Value Date    WBC 5.6 07/06/2024    WBC 5.7 07/05/2024    RBC 4.71 07/06/2024    RBC 4.49 07/05/2024    HGB 13.5 07/06/2024    HGB 12.8 07/05/2024    HCT 41.4 07/06/2024    HCT 39.4 07/05/2024    MCV 88 07/06/2024    MCV 88 07/05/2024    MCH 28.7 07/06/2024    MCH 28.5 07/05/2024    MCHC 32.6 07/06/2024    MCHC 32.5 07/05/2024    RDW 13.2 07/06/2024    RDW 13.2 07/05/2024     07/06/2024     07/05/2024       Cardiac Enzymes:    Lab Results   Component Value Date    TROPHS 5 07/05/2024    TROPHS 4 07/05/2024       Hepatic Function Panel:    Lab Results   Component Value Date    ALKPHOS 74 07/05/2024    ALT 14 07/05/2024    AST 17 07/05/2024    PROT 7.5 07/05/2024    BILITOT 0.4 07/05/2024       Diagnostic Studies:     Lower extremity venous duplex bilateral    Result Date: 7/6/2024  Interpreted By:  Schoenberger, Joseph, STUDY: [facility] US LOWER EXTREMITY VENOUS DUPLEX BILATERAL;  7/6/2024 3:13 pm   INDICATION: Signs/Symptoms:TIA, with PFO, r/o lower extremity DVT.   COMPARISON: None.   ACCESSION NUMBER(S): [id number]   ORDERING CLINICIAN: JOHN GONZALEZ   TECHNIQUE: Vascular ultrasound of the bilateral lower extremities was performed. Real-time compression views as well as Gray scale, color Doppler and spectral Doppler waveform analysis was performed.   FINDINGS: Evaluation of the visualized portions of the bilateral common femoral vein, proximal, mid, and distal femoral vein, and popliteal vein were performed.  Evaluation of the visualized portions of the  posterior tibial and peroneal veins  were also performed.   Limitations:  None   The evaluated veins demonstrate normal compressibility. There is intact venous flow demonstrating normal respiratory variability and normal augmentation of flow with calf compression. Therefore, there is no ultrasonographic evidence for deep vein thrombosis within the evaluated veins.   Respiratory variation and augmentation to calf pressure was noted.       No sonographic findings suggesting right or left lower extremity deep venous thrombosis.   MACRO: None   Signed by: Joseph Schoenberger 7/6/2024 3:26 PM Dictation workstation:   [id number]    Transthoracic Echo (TTE) Complete    Result Date: 7/6/2024          [place] [address]  Tel [phone] Fax [phone] TRANSTHORACIC ECHOCARDIOGRAM REPORT Patient Name:      VICTOR M Casanoav Physician:    [id number] Nathan Lee MD, [facility] Study Date:        7/6/2024              Ordering Provider:    [id number] JOHN GONZALEZ MRN/PID:           [id number]              Fellow: Accession#:        [id number]          Nurse: Date of Birth/Age: 1950 / 74 years   Sonographer:          Jenn Tinoco RDCS Gender:            F                     Additional Staff: Height:            149.86 cm             Admit Date:           7/5/2024 Weight:            57.61 kg              Admission Status:     Inpatient -                                                                Routine BSA / BMI:         1.52 m2 / 25.65 kg/m2 Department Location:  [address] [place] [address] Blood Pressure: 134 /71 mmHg Study Type:    TRANSTHORACIC ECHO (TTE) COMPLETE Diagnosis/ICD: Other transient cerebral ischemic attacks and related                syndromes-G45.8  Indication:    Transischemic Attack CPT Codes:     Echo Complete w Full Doppler-[id number] Patient History: Pertinent History: TIA, HTN and Hyperlipidemia. Study Detail: The following Echo studies were performed: M-Mode, 2D, Doppler and               color flow. Technically challenging study due to body habitus.               Agitated saline used as a contrast agent for intraseptal flow               evaluation and Definity used as a contrast agent for endocardial               border definition. Total contrast used for this procedure was 4 mL               via IV push.  PHYSICIAN INTERPRETATION: Left Ventricle: The left ventricular systolic function is normal, with a visually estimated ejection fraction of 65-70%. There are no regional wall motion abnormalities. The left ventricular cavity size is normal. The left ventricular septal wall thickness is moderately increased. There is moderately increased left ventricular posterior wall thickness. Spectral Doppler shows an impaired relaxation pattern of left ventricular diastolic filling. Left Atrium: The left atrium is normal in size. A bubble study using agitated saline was performed. Bubble study is positive. A moderate PFO (11-20 bubbles) was demonstrated. Left atrial volume index is 11.2 ml/m2. Right Ventricle: The right ventricle is normal in size. There is mildly increased right ventriclar wall thickness. There is normal right ventricular global systolic function. Normal right ventricular chamber size and function. Right Atrium: The right atrium is normal in size. Aortic Valve: The aortic valve is trileaflet. The aortic valve dimensionless index is 0.88. There is no evidence of aortic valve regurgitation. The peak instantaneous gradient of the aortic valve is 13.0 mmHg. The mean gradient of the aortic valve is 6.0 mmHg. Mitral Valve: The mitral valve is normal in structure. There is mild mitral valve regurgitation. Mild (+) mitral regurgitation. Tricuspid Valve: The  tricuspid valve is structurally normal. No evidence of tricuspid regurgitation. Trivial tricuspid regurgitation with estimated RVSP 13 mmHg. Pulmonic Valve: The pulmonic valve is structurally normal. There is no indication of pulmonic valve regurgitation. Pericardium: There is no pericardial effusion noted. Aorta: The aortic root is normal. Systemic Veins: The inferior vena cava was not well visualized.      CONCLUSIONS:  1. The left ventricular systolic function is normal, with a visually estimated ejection fraction of 65-70%.  2. Spectral Doppler shows an impaired relaxation pattern of left ventricular diastolic filling.  3. Moderately increased left ventricular septal thickness.  4. The left ventricular posterior wall thickness is moderately increased.  5. There is normal right ventricular global systolic function.  6. Mild (+) mitral regurgitation.  7. Trivial tricuspid regurgitation with estimated RVSP 13 mmHg.  8. No previous available for comparison.  9. A bubble study using agitated saline was performed. Bubble study is positive. A moderate PFO (11-20 bubbles) was demonstrated.       ECG 12 lead    Result Date: 7/5/2024  Normal sinus rhythm Possible Left atrial enlargement Left ventricular hypertrophy Cannot rule out Septal infarct (cited on or before 05-JUL-2024) ST & Marked T wave abnormality, consider inferolateral ischemia Abnormal ECG When compared with ECG of 15-JUL-2022 13:25, Previous ECG has undetermined rhythm, needs review Serial changes of Septal infarct Present See ED provider note for full interpretation and clinical correlation      Radiology:     Lower extremity venous duplex bilateral   Final Result   No sonographic findings suggesting right or left lower extremity deep   venous thrombosis.        MACRO:   None        Signed by: Joseph Schoenberger 7/6/2024 3:26 PM   Dictation workstation:   [id number]      Transthoracic Echo (TTE) Complete   Final Result      MR brain wo IV contrast    Final Result   No acute intracranial abnormality. Moderate microangiopathic disease.        Signed by: Gareth Rossi 7/5/2024 6:19 PM   Dictation workstation:   [id number]      CT angio head and neck w and wo IV contrast   Final Result   Motion artifact mildly degrades assessment. No evidence of source   vessel arterial occlusion within the head and neck.        There is concern for moderate stenosis involving the right MCA distal   M1 segment extending to the proximal M2 branches. Suspected   hypoplastic versus absent right A1 segment. Otherwise no additional   flow significant stenosis within the head and neck identified.        No evidence of dissection. No NASCET stenosis of the internal carotid   artery origins.        2.0 cm diameter nodule suggested within the inferior aspect of the   left lobe of the thyroid gland; recommend nonemergent ultrasound for   further characterization.        MACRO:   Incidental Finding:  There are few small hypoattenuating nodules   measuring equal to or greater than 1.5 cm in the thyroid gland.   (**-YCF-**)        Instructions:  Further evaluation with nonemergent thyroid ultrasound.   (Managing Incidental Thyroid Nodules Detected on Imaging: White Paper   of the ACR Incidental Thyroid Findings Committee. Ora Haines. et   al. Journal of the American College of Radiology,Volume 12, Issue 2,   143 - 150.) [email or web address]        Signed by: Jimenez Martinez 7/5/2024 10:04 AM   Dictation workstation:   [id number]      XR chest 1 view   Final Result   No radiographic evidence of acute cardiopulmonary disease.   Signed by Raphael Nelson MD      CT head wo IV contrast   Final Result   No evidence of acute cortical infarct or intracranial hemorrhage.        No evidence of intracranial hemorrhage or displaced skull fracture.        MACRO:   None.        Signed by: Nicole Kapoor 7/5/2024 8:20 AM   Dictation workstation:   [id number]          Problem List:     Patient Active Problem  List   Diagnosis    TIA (transient ischemic attack)    Nonintractable headache, unspecified chronicity pattern, unspecified headache type       ASSESSMENT & PLAN:   1.  CVA: With a demonstrated infarct in the MCA territory as noted above and improved symptoms with resolution of slurred speech and arm numbness.  The patient had demonstrated PFO on the echocardiogram and in view of recurrent TIA, will recommend a KACIE for further evaluation to determine if the PFO is large enough and suitable for percutaneous closure.  In the meantime, continue with current antiplatelet therapy per neurology.  2.  Hypertension: Continue with current blood pressure medications and allowing for permissive hypertension in view of recent stroke, to maintain cerebral perfusion.  3.  Abnormal EKG: With deep and T wave inversion in the lateral leads as noted above, likely secondary to LVH with repolarization changes.  This is not new when compared to EKG from 2022 as noted above, but patient will benefit from elective ischemic evaluation as an outpatient to rule out any significant underlying coronary artery disease.        Thank you for the opportunity to participate in the care of your patient.  Please do not hesitate to call if you have any questions.    Electronically signed by Smith Vasquez MD, on 7/7/2024 at 9:46 AM

## 2024-07-10 ENCOUNTER — APPOINTMENT (OUTPATIENT)
Dept: RADIOLOGY | Facility: HOSPITAL | Age: 74
End: 2024-07-10
Payer: COMMERCIAL

## 2024-07-10 ENCOUNTER — HOSPITAL ENCOUNTER (INPATIENT)
Facility: HOSPITAL | Age: 74
LOS: 1 days | Discharge: HOME | End: 2024-07-12
Attending: STUDENT IN AN ORGANIZED HEALTH CARE EDUCATION/TRAINING PROGRAM | Admitting: INTERNAL MEDICINE
Payer: COMMERCIAL

## 2024-07-10 ENCOUNTER — APPOINTMENT (OUTPATIENT)
Dept: CARDIOLOGY | Facility: HOSPITAL | Age: 74
End: 2024-07-10
Payer: COMMERCIAL

## 2024-07-10 DIAGNOSIS — Q21.12 PFO (PATENT FORAMEN OVALE) (HHS-HCC): ICD-10-CM

## 2024-07-10 DIAGNOSIS — G45.9 TIA (TRANSIENT ISCHEMIC ATTACK): ICD-10-CM

## 2024-07-10 DIAGNOSIS — R20.0 LEFT ARM NUMBNESS: ICD-10-CM

## 2024-07-10 DIAGNOSIS — K21.9 GERD WITHOUT ESOPHAGITIS: ICD-10-CM

## 2024-07-10 DIAGNOSIS — R29.90 STROKE-LIKE SYMPTOMS: Primary | ICD-10-CM

## 2024-07-10 LAB
ALBUMIN SERPL BCP-MCNC: 4.1 G/DL (ref 3.4–5)
ALBUMIN SERPL BCP-MCNC: 4.3 G/DL (ref 3.4–5)
ALP SERPL-CCNC: 76 U/L (ref 33–136)
ALP SERPL-CCNC: 78 U/L (ref 33–136)
ALT SERPL W P-5'-P-CCNC: 20 U/L (ref 7–45)
ALT SERPL W P-5'-P-CCNC: 21 U/L (ref 7–45)
AMPHETAMINES UR QL SCN: NORMAL
ANION GAP SERPL CALC-SCNC: 13 MMOL/L (ref 10–20)
APPEARANCE UR: CLEAR
APTT PPP: 31 SECONDS (ref 27–38)
AST SERPL W P-5'-P-CCNC: 20 U/L (ref 9–39)
AST SERPL W P-5'-P-CCNC: 23 U/L (ref 9–39)
BACTERIA #/AREA URNS AUTO: ABNORMAL /HPF
BARBITURATES UR QL SCN: NORMAL
BASOPHILS # BLD AUTO: 0.06 X10*3/UL (ref 0–0.1)
BASOPHILS NFR BLD AUTO: 1.2 %
BENZODIAZ UR QL SCN: NORMAL
BILIRUB DIRECT SERPL-MCNC: 0 MG/DL (ref 0–0.3)
BILIRUB SERPL-MCNC: 0.4 MG/DL (ref 0–1.2)
BILIRUB SERPL-MCNC: 0.4 MG/DL (ref 0–1.2)
BILIRUB UR STRIP.AUTO-MCNC: NEGATIVE MG/DL
BNP SERPL-MCNC: 28 PG/ML (ref 0–99)
BUN SERPL-MCNC: 25 MG/DL (ref 6–23)
BZE UR QL SCN: NORMAL
CALCIUM SERPL-MCNC: 9.1 MG/DL (ref 8.6–10.3)
CANNABINOIDS UR QL SCN: NORMAL
CARDIAC TROPONIN I PNL SERPL HS: 7 NG/L (ref 0–13)
CHLORIDE SERPL-SCNC: 108 MMOL/L (ref 98–107)
CHOLEST SERPL-MCNC: 121 MG/DL (ref 0–199)
CHOLESTEROL/HDL RATIO: 2.6
CO2 SERPL-SCNC: 24 MMOL/L (ref 21–32)
COLOR UR: ABNORMAL
CREAT SERPL-MCNC: 0.91 MG/DL (ref 0.5–1.05)
EGFRCR SERPLBLD CKD-EPI 2021: 66 ML/MIN/1.73M*2
EOSINOPHIL # BLD AUTO: 0.21 X10*3/UL (ref 0–0.4)
EOSINOPHIL NFR BLD AUTO: 4.3 %
ERYTHROCYTE [DISTWIDTH] IN BLOOD BY AUTOMATED COUNT: 13 % (ref 11.5–14.5)
FENTANYL+NORFENTANYL UR QL SCN: NORMAL
GLUCOSE BLD MANUAL STRIP-MCNC: 91 MG/DL (ref 74–99)
GLUCOSE SERPL-MCNC: 95 MG/DL (ref 74–99)
GLUCOSE UR STRIP.AUTO-MCNC: NORMAL MG/DL
HCT VFR BLD AUTO: 38.8 % (ref 36–46)
HDLC SERPL-MCNC: 47.2 MG/DL
HGB BLD-MCNC: 12.6 G/DL (ref 12–16)
HOLD SPECIMEN: NORMAL
HYALINE CASTS #/AREA URNS AUTO: ABNORMAL /LPF
IMM GRANULOCYTES # BLD AUTO: 0.02 X10*3/UL (ref 0–0.5)
IMM GRANULOCYTES NFR BLD AUTO: 0.4 % (ref 0–0.9)
INR PPP: 1 (ref 0.9–1.1)
KETONES UR STRIP.AUTO-MCNC: NEGATIVE MG/DL
LDLC SERPL CALC-MCNC: 62 MG/DL
LEUKOCYTE ESTERASE UR QL STRIP.AUTO: ABNORMAL
LYMPHOCYTES # BLD AUTO: 2.02 X10*3/UL (ref 0.8–3)
LYMPHOCYTES NFR BLD AUTO: 41.1 %
MCH RBC QN AUTO: 28.8 PG (ref 26–34)
MCHC RBC AUTO-ENTMCNC: 32.5 G/DL (ref 32–36)
MCV RBC AUTO: 89 FL (ref 80–100)
METHADONE UR QL SCN: NORMAL
MONOCYTES # BLD AUTO: 0.56 X10*3/UL (ref 0.05–0.8)
MONOCYTES NFR BLD AUTO: 11.4 %
MUCOUS THREADS #/AREA URNS AUTO: ABNORMAL /LPF
NEUTROPHILS # BLD AUTO: 2.04 X10*3/UL (ref 1.6–5.5)
NEUTROPHILS NFR BLD AUTO: 41.6 %
NITRITE UR QL STRIP.AUTO: NEGATIVE
NON HDL CHOLESTEROL: 74 MG/DL (ref 0–149)
NRBC BLD-RTO: 0 /100 WBCS (ref 0–0)
OPIATES UR QL SCN: NORMAL
OXYCODONE+OXYMORPHONE UR QL SCN: NORMAL
PCP UR QL SCN: NORMAL
PH UR STRIP.AUTO: 6 [PH]
PLATELET # BLD AUTO: 252 X10*3/UL (ref 150–450)
POTASSIUM SERPL-SCNC: 4 MMOL/L (ref 3.5–5.3)
PROT SERPL-MCNC: 6.8 G/DL (ref 6.4–8.2)
PROT SERPL-MCNC: 7.3 G/DL (ref 6.4–8.2)
PROT UR STRIP.AUTO-MCNC: NEGATIVE MG/DL
PROTHROMBIN TIME: 10.9 SECONDS (ref 9.8–12.8)
RBC # BLD AUTO: 4.38 X10*6/UL (ref 4–5.2)
RBC # UR STRIP.AUTO: NEGATIVE /UL
RBC #/AREA URNS AUTO: ABNORMAL /HPF
SODIUM SERPL-SCNC: 141 MMOL/L (ref 136–145)
SP GR UR STRIP.AUTO: 1.02
SQUAMOUS #/AREA URNS AUTO: ABNORMAL /HPF
TRIGL SERPL-MCNC: 58 MG/DL (ref 0–149)
UROBILINOGEN UR STRIP.AUTO-MCNC: NORMAL MG/DL
VLDL: 12 MG/DL (ref 0–40)
WBC # BLD AUTO: 4.9 X10*3/UL (ref 4.4–11.3)
WBC #/AREA URNS AUTO: ABNORMAL /HPF

## 2024-07-10 PROCEDURE — G0378 HOSPITAL OBSERVATION PER HR: HCPCS

## 2024-07-10 PROCEDURE — 85610 PROTHROMBIN TIME: CPT | Performed by: STUDENT IN AN ORGANIZED HEALTH CARE EDUCATION/TRAINING PROGRAM

## 2024-07-10 PROCEDURE — 87086 URINE CULTURE/COLONY COUNT: CPT | Mod: ELYLAB | Performed by: STUDENT IN AN ORGANIZED HEALTH CARE EDUCATION/TRAINING PROGRAM

## 2024-07-10 PROCEDURE — 36415 COLL VENOUS BLD VENIPUNCTURE: CPT | Performed by: STUDENT IN AN ORGANIZED HEALTH CARE EDUCATION/TRAINING PROGRAM

## 2024-07-10 PROCEDURE — 2550000001 HC RX 255 CONTRASTS: Performed by: STUDENT IN AN ORGANIZED HEALTH CARE EDUCATION/TRAINING PROGRAM

## 2024-07-10 PROCEDURE — 83880 ASSAY OF NATRIURETIC PEPTIDE: CPT | Performed by: STUDENT IN AN ORGANIZED HEALTH CARE EDUCATION/TRAINING PROGRAM

## 2024-07-10 PROCEDURE — 83718 ASSAY OF LIPOPROTEIN: CPT | Performed by: STUDENT IN AN ORGANIZED HEALTH CARE EDUCATION/TRAINING PROGRAM

## 2024-07-10 PROCEDURE — 2500000001 HC RX 250 WO HCPCS SELF ADMINISTERED DRUGS (ALT 637 FOR MEDICARE OP): Performed by: STUDENT IN AN ORGANIZED HEALTH CARE EDUCATION/TRAINING PROGRAM

## 2024-07-10 PROCEDURE — 84484 ASSAY OF TROPONIN QUANT: CPT | Performed by: STUDENT IN AN ORGANIZED HEALTH CARE EDUCATION/TRAINING PROGRAM

## 2024-07-10 PROCEDURE — 84075 ASSAY ALKALINE PHOSPHATASE: CPT | Performed by: STUDENT IN AN ORGANIZED HEALTH CARE EDUCATION/TRAINING PROGRAM

## 2024-07-10 PROCEDURE — 83036 HEMOGLOBIN GLYCOSYLATED A1C: CPT | Mod: ELYLAB | Performed by: STUDENT IN AN ORGANIZED HEALTH CARE EDUCATION/TRAINING PROGRAM

## 2024-07-10 PROCEDURE — 99222 1ST HOSP IP/OBS MODERATE 55: CPT | Performed by: STUDENT IN AN ORGANIZED HEALTH CARE EDUCATION/TRAINING PROGRAM

## 2024-07-10 PROCEDURE — 99285 EMERGENCY DEPT VISIT HI MDM: CPT

## 2024-07-10 PROCEDURE — 85025 COMPLETE CBC W/AUTO DIFF WBC: CPT | Performed by: STUDENT IN AN ORGANIZED HEALTH CARE EDUCATION/TRAINING PROGRAM

## 2024-07-10 PROCEDURE — 70450 CT HEAD/BRAIN W/O DYE: CPT | Performed by: RADIOLOGY

## 2024-07-10 PROCEDURE — 80076 HEPATIC FUNCTION PANEL: CPT | Mod: CCI | Performed by: STUDENT IN AN ORGANIZED HEALTH CARE EDUCATION/TRAINING PROGRAM

## 2024-07-10 PROCEDURE — 70450 CT HEAD/BRAIN W/O DYE: CPT

## 2024-07-10 PROCEDURE — 70496 CT ANGIOGRAPHY HEAD: CPT

## 2024-07-10 PROCEDURE — 85730 THROMBOPLASTIN TIME PARTIAL: CPT | Performed by: STUDENT IN AN ORGANIZED HEALTH CARE EDUCATION/TRAINING PROGRAM

## 2024-07-10 PROCEDURE — 93005 ELECTROCARDIOGRAM TRACING: CPT

## 2024-07-10 PROCEDURE — 80307 DRUG TEST PRSMV CHEM ANLYZR: CPT | Performed by: STUDENT IN AN ORGANIZED HEALTH CARE EDUCATION/TRAINING PROGRAM

## 2024-07-10 PROCEDURE — 81001 URINALYSIS AUTO W/SCOPE: CPT | Performed by: STUDENT IN AN ORGANIZED HEALTH CARE EDUCATION/TRAINING PROGRAM

## 2024-07-10 PROCEDURE — 82947 ASSAY GLUCOSE BLOOD QUANT: CPT

## 2024-07-10 RX ORDER — ATORVASTATIN CALCIUM 80 MG/1
80 TABLET, FILM COATED ORAL NIGHTLY
Status: DISCONTINUED | OUTPATIENT
Start: 2024-07-10 | End: 2024-07-12 | Stop reason: HOSPADM

## 2024-07-10 RX ORDER — DEXTROSE 50 % IN WATER (D50W) INTRAVENOUS SYRINGE
12.5
Status: DISCONTINUED | OUTPATIENT
Start: 2024-07-10 | End: 2024-07-12 | Stop reason: HOSPADM

## 2024-07-10 RX ORDER — PANTOPRAZOLE SODIUM 40 MG/10ML
40 INJECTION, POWDER, LYOPHILIZED, FOR SOLUTION INTRAVENOUS
Status: DISCONTINUED | OUTPATIENT
Start: 2024-07-11 | End: 2024-07-11

## 2024-07-10 RX ORDER — PANTOPRAZOLE SODIUM 40 MG/1
40 TABLET, DELAYED RELEASE ORAL
Status: DISCONTINUED | OUTPATIENT
Start: 2024-07-11 | End: 2024-07-12 | Stop reason: HOSPADM

## 2024-07-10 RX ORDER — ASPIRIN 325 MG
50000 TABLET, DELAYED RELEASE (ENTERIC COATED) ORAL
Status: DISCONTINUED | OUTPATIENT
Start: 2024-07-14 | End: 2024-07-10

## 2024-07-10 RX ORDER — ACETAMINOPHEN 160 MG/5ML
650 SOLUTION ORAL EVERY 4 HOURS PRN
Status: DISCONTINUED | OUTPATIENT
Start: 2024-07-10 | End: 2024-07-12 | Stop reason: HOSPADM

## 2024-07-10 RX ORDER — ONDANSETRON HYDROCHLORIDE 2 MG/ML
4 INJECTION, SOLUTION INTRAVENOUS EVERY 8 HOURS PRN
Status: DISCONTINUED | OUTPATIENT
Start: 2024-07-10 | End: 2024-07-12 | Stop reason: HOSPADM

## 2024-07-10 RX ORDER — ERGOCALCIFEROL 1.25 MG/1
1250 CAPSULE ORAL
Status: DISCONTINUED | OUTPATIENT
Start: 2024-07-14 | End: 2024-07-12 | Stop reason: HOSPADM

## 2024-07-10 RX ORDER — INSULIN LISPRO 100 [IU]/ML
0-10 INJECTION, SOLUTION INTRAVENOUS; SUBCUTANEOUS
Status: DISCONTINUED | OUTPATIENT
Start: 2024-07-10 | End: 2024-07-10

## 2024-07-10 RX ORDER — CLOPIDOGREL BISULFATE 75 MG/1
75 TABLET ORAL DAILY
Status: DISCONTINUED | OUTPATIENT
Start: 2024-07-10 | End: 2024-07-11

## 2024-07-10 RX ORDER — ENOXAPARIN SODIUM 100 MG/ML
40 INJECTION SUBCUTANEOUS EVERY 24 HOURS
Status: DISCONTINUED | OUTPATIENT
Start: 2024-07-10 | End: 2024-07-11

## 2024-07-10 RX ORDER — NAPROXEN SODIUM 220 MG/1
81 TABLET, FILM COATED ORAL DAILY
Status: DISCONTINUED | OUTPATIENT
Start: 2024-07-10 | End: 2024-07-12 | Stop reason: HOSPADM

## 2024-07-10 RX ORDER — ACETAMINOPHEN 325 MG/1
650 TABLET ORAL EVERY 4 HOURS PRN
Status: DISCONTINUED | OUTPATIENT
Start: 2024-07-10 | End: 2024-07-12 | Stop reason: HOSPADM

## 2024-07-10 RX ORDER — ACETAMINOPHEN 650 MG/1
650 SUPPOSITORY RECTAL EVERY 4 HOURS PRN
Status: DISCONTINUED | OUTPATIENT
Start: 2024-07-10 | End: 2024-07-12 | Stop reason: HOSPADM

## 2024-07-10 RX ORDER — TALC
3 POWDER (GRAM) TOPICAL DAILY
Status: DISCONTINUED | OUTPATIENT
Start: 2024-07-10 | End: 2024-07-12 | Stop reason: HOSPADM

## 2024-07-10 RX ORDER — DEXTROSE 50 % IN WATER (D50W) INTRAVENOUS SYRINGE
25
Status: DISCONTINUED | OUTPATIENT
Start: 2024-07-10 | End: 2024-07-12 | Stop reason: HOSPADM

## 2024-07-10 RX ORDER — ATENOLOL 50 MG/1
100 TABLET ORAL DAILY
Status: DISCONTINUED | OUTPATIENT
Start: 2024-07-10 | End: 2024-07-12 | Stop reason: HOSPADM

## 2024-07-10 RX ORDER — ENOXAPARIN SODIUM 100 MG/ML
40 INJECTION SUBCUTANEOUS EVERY 24 HOURS
Status: DISCONTINUED | OUTPATIENT
Start: 2024-07-10 | End: 2024-07-10

## 2024-07-10 RX ORDER — DICLOFENAC SODIUM 10 MG/G
4 GEL TOPICAL 4 TIMES DAILY PRN
Status: DISCONTINUED | OUTPATIENT
Start: 2024-07-10 | End: 2024-07-12 | Stop reason: HOSPADM

## 2024-07-10 RX ORDER — DOCUSATE SODIUM 100 MG/1
100 CAPSULE, LIQUID FILLED ORAL 2 TIMES DAILY
Status: DISCONTINUED | OUTPATIENT
Start: 2024-07-10 | End: 2024-07-12 | Stop reason: HOSPADM

## 2024-07-10 RX ORDER — ONDANSETRON 4 MG/1
4 TABLET, ORALLY DISINTEGRATING ORAL EVERY 8 HOURS PRN
Status: DISCONTINUED | OUTPATIENT
Start: 2024-07-10 | End: 2024-07-12 | Stop reason: HOSPADM

## 2024-07-10 SDOH — SOCIAL STABILITY: SOCIAL INSECURITY: DOES ANYONE TRY TO KEEP YOU FROM HAVING/CONTACTING OTHER FRIENDS OR DOING THINGS OUTSIDE YOUR HOME?: NO

## 2024-07-10 SDOH — SOCIAL STABILITY: SOCIAL INSECURITY: WERE YOU ABLE TO COMPLETE ALL THE BEHAVIORAL HEALTH SCREENINGS?: YES

## 2024-07-10 SDOH — SOCIAL STABILITY: SOCIAL INSECURITY: HAVE YOU HAD ANY THOUGHTS OF HARMING ANYONE ELSE?: NO

## 2024-07-10 SDOH — SOCIAL STABILITY: SOCIAL INSECURITY: HAS ANYONE EVER THREATENED TO HURT YOUR FAMILY OR YOUR PETS?: NO

## 2024-07-10 SDOH — SOCIAL STABILITY: SOCIAL INSECURITY: ARE THERE ANY APPARENT SIGNS OF INJURIES/BEHAVIORS THAT COULD BE RELATED TO ABUSE/NEGLECT?: NO

## 2024-07-10 SDOH — SOCIAL STABILITY: SOCIAL INSECURITY: HAVE YOU HAD THOUGHTS OF HARMING ANYONE ELSE?: NO

## 2024-07-10 SDOH — SOCIAL STABILITY: SOCIAL INSECURITY: DO YOU FEEL ANYONE HAS EXPLOITED OR TAKEN ADVANTAGE OF YOU FINANCIALLY OR OF YOUR PERSONAL PROPERTY?: NO

## 2024-07-10 SDOH — SOCIAL STABILITY: SOCIAL INSECURITY: ABUSE: ADULT

## 2024-07-10 SDOH — SOCIAL STABILITY: SOCIAL INSECURITY: DO YOU FEEL UNSAFE GOING BACK TO THE PLACE WHERE YOU ARE LIVING?: NO

## 2024-07-10 SDOH — SOCIAL STABILITY: SOCIAL INSECURITY: ARE YOU OR HAVE YOU BEEN THREATENED OR ABUSED PHYSICALLY, EMOTIONALLY, OR SEXUALLY BY ANYONE?: NO

## 2024-07-10 ASSESSMENT — COGNITIVE AND FUNCTIONAL STATUS - GENERAL
PATIENT BASELINE BEDBOUND: NO
MOBILITY SCORE: 24
DAILY ACTIVITIY SCORE: 24
MOBILITY SCORE: 24
MOBILITY SCORE: 24
DAILY ACTIVITIY SCORE: 24
DAILY ACTIVITIY SCORE: 24

## 2024-07-10 ASSESSMENT — ACTIVITIES OF DAILY LIVING (ADL)
TOILETING: INDEPENDENT
HEARING - RIGHT EAR: DIFFICULTY WITH NOISE
DRESSING YOURSELF: INDEPENDENT
WALKS IN HOME: INDEPENDENT
PATIENT'S MEMORY ADEQUATE TO SAFELY COMPLETE DAILY ACTIVITIES?: YES
JUDGMENT_ADEQUATE_SAFELY_COMPLETE_DAILY_ACTIVITIES: YES
GROOMING: INDEPENDENT
BATHING: INDEPENDENT
ADEQUATE_TO_COMPLETE_ADL: YES
LACK_OF_TRANSPORTATION: NO
ASSISTIVE_DEVICE: EYEGLASSES
HEARING - LEFT EAR: DIFFICULTY WITH NOISE
FEEDING YOURSELF: INDEPENDENT

## 2024-07-10 ASSESSMENT — LIFESTYLE VARIABLES
HOW OFTEN DO YOU HAVE A DRINK CONTAINING ALCOHOL: NEVER
AUDIT-C TOTAL SCORE: 0
HAVE PEOPLE ANNOYED YOU BY CRITICIZING YOUR DRINKING: NO
SKIP TO QUESTIONS 9-10: 1
EVER HAD A DRINK FIRST THING IN THE MORNING TO STEADY YOUR NERVES TO GET RID OF A HANGOVER: NO
TOTAL SCORE: 0
EVER FELT BAD OR GUILTY ABOUT YOUR DRINKING: NO
HOW OFTEN DO YOU HAVE 6 OR MORE DRINKS ON ONE OCCASION: NEVER
HOW MANY STANDARD DRINKS CONTAINING ALCOHOL DO YOU HAVE ON A TYPICAL DAY: PATIENT DOES NOT DRINK
AUDIT-C TOTAL SCORE: 0
HAVE YOU EVER FELT YOU SHOULD CUT DOWN ON YOUR DRINKING: NO

## 2024-07-10 ASSESSMENT — PATIENT HEALTH QUESTIONNAIRE - PHQ9
2. FEELING DOWN, DEPRESSED OR HOPELESS: NOT AT ALL
SUM OF ALL RESPONSES TO PHQ9 QUESTIONS 1 & 2: 0
1. LITTLE INTEREST OR PLEASURE IN DOING THINGS: NOT AT ALL

## 2024-07-10 ASSESSMENT — PAIN DESCRIPTION - LOCATION: LOCATION: HEAD

## 2024-07-10 ASSESSMENT — PAIN SCALES - GENERAL
PAINLEVEL_OUTOF10: 6
PAINLEVEL_OUTOF10: 0 - NO PAIN
PAINLEVEL_OUTOF10: 0 - NO PAIN

## 2024-07-10 ASSESSMENT — PAIN - FUNCTIONAL ASSESSMENT
PAIN_FUNCTIONAL_ASSESSMENT: 0-10

## 2024-07-10 ASSESSMENT — PAIN DESCRIPTION - FREQUENCY: FREQUENCY: CONSTANT/CONTINUOUS

## 2024-07-10 ASSESSMENT — PAIN DESCRIPTION - DESCRIPTORS: DESCRIPTORS: TIGHTNESS

## 2024-07-10 NOTE — ED PROVIDER NOTES
History/Exam limitations: none.   Additional history was obtained from patient.    HPI:       Moon Martniez is a 74 y.o. with a history of hypertension, hyperlipidemia, recent TIA who is presenting with reported slurred speech and left arm numbness.  Patient noticed symptoms starting around 9:30 AM.  Recently started on dual antiplatelet therapy for recent TIA.  Patient's slurred speech has resolved, reports continued mild decreased sensation in the left upper extremity.  No weakness, no vision changes, no facial droop.       Last Known Well Time: 0930        ------------------------------------------------------------------------------------------------------------------------------------------     Physical Exam:    ED Triage Vitals [07/10/24 1054]   Temperature Heart Rate Respirations BP   36.1 °C (97 °F) 58 16 167/80      Pulse Ox Temp src Heart Rate Source Patient Position   100 % -- Monitor Sitting      BP Location FiO2 (%)     Right arm --          Gen: Not in acute distress  Head/Neck: NCAT  Eyes: Anicteric sclerae, noninjected conjunctivae  Nose: No rhinorrhea  Mouth:  MMM  Heart: Regular rate and rhythm w/ no murmurs, rubs, gallops  Lungs: CTAB w/o wheezes, rales, rhonchi, no increased work of breathing  Abdomen: Soft, NT/ND  Musculoskeletal: No deformities  Extremities: No edema.  Neurologic: Please see below for NIHSS  Skin: No rashes noted  Psychological: Calm        Interval: Baseline  Time: 1:36 PM  Person Administering Scale: Rocky Hollis MD     1a  Level of consciousness: 0=alert; keenly responsive   1b. LOC questions:  0=Performs both tasks correctly   1c. LOC commands: 0=Performs both tasks correctly   2.  Best Gaze: 0=normal   3. Visual: 0=No visual loss   4. Facial Palsy: 0=Normal symmetric movement   5a. Motor left arm: 0=No drift, limb holds 90 (or 45) degrees for full 10 seconds   5b.  Motor right arm: 0=No drift, limb holds 90 (or 45) degrees for full 10 seconds   6a. motor left leg:  0=No drift, limb holds 90 (or 45) degrees for full 10 seconds   6b  Motor right le=No drift, limb holds 90 (or 45) degrees for full 10 seconds   7. Limb Ataxia: 0=Absent   8.  Sensory: 1=Mild to moderate sensory loss; patient feels pinprick is less sharp or is dull on the affected side; there is a loss of superficial pain with pinprick but patient is aware She is being touched   9. Best Language:  0=No aphasia, normal   10. Dysarthria: 0=Normal   11. Extinction and Inattention: 0=No abnormality     Total:   1         VAN: VAN: Negative     ------------------------------------------------------------------------------------------------------------------------------------------     Medical Decision Making:     Diagnoses as of 07/10/24 1336   Stroke-like symptoms   Left arm numbness        EKG interpreted by myself: 12 ECG obtained 1129 by my interpretation demonstrates sinus pericardia with a rate of 49, no acute ST elevation, T wave inversions in V3 through V6.  Similar morphology to prior ECG.      Independent Interpretation of Studies: I independently interpreted the CT head and see No obvious evidence of intracranial hemorrhage    CT brain attack head wo IV contrast   Final Result   No acute intracranial bleed or focal mass effect.        Mild volume loss.        Mild chronic white matter ischemic disease in the deep   periventricular regions.        MACRO:   Shane Hyatt discussed the significance and urgency of this critical   finding epic secure chat with  PORSCHE BOOTHE on 7/10/2024 at 11:25   am.  (**-RCF-**) Findings:  See findings.        Signed by: Shane Hyatt 7/10/2024 11:26 AM   Dictation workstation:   [id number]        Labs Reviewed   COMPREHENSIVE METABOLIC PANEL - Abnormal       Result Value    Glucose 95      Sodium 141      Potassium 4.0      Chloride 108 (*)     Bicarbonate 24      Anion Gap 13      Urea Nitrogen 25 (*)     Creatinine 0.91      eGFR 66      Calcium 9.1      Albumin 4.3       Alkaline Phosphatase 78      Total Protein 7.3      AST 23      Bilirubin, Total 0.4      ALT 21     TROPONIN I, HIGH SENSITIVITY - Normal    Troponin I, High Sensitivity 7      Narrative:     Less than 99th percentile of normal range cutoff-  Female and children under 18 years old <14 ng/L; Male <21 ng/L: Negative  Repeat testing should be performed if clinically indicated.     Female and children under 18 years old 14-50 ng/L; Male 21-50 ng/L:  Consistent with possible cardiac damage and possible increased clinical   risk. Serial measurements may help to assess extent of myocardial damage.     >50 ng/L: Consistent with cardiac damage, increased clinical risk and  myocardial infarction. Serial measurements may help assess extent of   myocardial damage.      NOTE: Children less than 1 year old may have higher baseline troponin   levels and results should be interpreted in conjunction with the overall   clinical context.     NOTE: Troponin I testing is performed using a different   testing methodology at [facility] than at other   [facility]. Direct result comparisons should only   be made within the same method.   PROTIME-INR - Normal    Protime 10.9      INR 1.0     APTT - Normal    aPTT 31      Narrative:     The APTT is no longer used for monitoring Unfractionated Heparin Therapy. For monitoring Heparin Therapy, use the Heparin Assay.   POCT GLUCOSE - Normal    POCT Glucose 91     CBC WITH AUTO DIFFERENTIAL    WBC 4.9      nRBC 0.0      RBC 4.38      Hemoglobin 12.6      Hematocrit 38.8      MCV 89      MCH 28.8      MCHC 32.5      RDW 13.0      Platelets 252      Neutrophils % 41.6      Immature Granulocytes %, Automated 0.4      Lymphocytes % 41.1      Monocytes % 11.4      Eosinophils % 4.3      Basophils % 1.2      Neutrophils Absolute 2.04      Immature Granulocytes Absolute, Automated 0.02      Lymphocytes Absolute 2.02      Monocytes Absolute 0.56      Eosinophils Absolute 0.21       Basophils Absolute 0.06     GRAY TOP    Extra Tube Hold for add-ons.     POCT GLUCOSE METER       External Records Reviewed: I reviewed recent and relevant outside records including: Reviewed patient's recent hospitalization and discharge summary on 7-7 patient was admitted for strokelike symptoms/TIA.  Patient had negative MRI brain.  Started on dual antiplatelet therapy.  Patient had moderate-sized PFO found on echo.  Moderate narrowing of right MCA on CTA imaging.     Discussion of Management with Other Providers:   I discussed the patient/results with: stroke neurology and the admitting team.  Dr Hicks with stroke neuro at [facility] recommends repeat MRI given new sustained symptoms of left arm numbness and cardiology consult for PFO closure.  Agrees with no TNK given nondisabling symptoms.      IV Thrombolysis IV Thrombolysis Checklist        IV Thrombolysis Given: No; Thrombolysis contraindication reason: Neurologic signs are mild and judged to be non-disabling        Procedure  Procedures          Rocky Hollis MD  07/10/24 3522

## 2024-07-10 NOTE — CARE PLAN
The patient's goals for the shift include      The clinical goals for the shift include remain HD stable      Problem: Fall/Injury  Goal: Not fall by end of shift  Outcome: Progressing  Goal: Be free from injury by end of the shift  Outcome: Progressing  Goal: Verbalize understanding of personal risk factors for fall in the hospital  Outcome: Progressing  Goal: Verbalize understanding of risk factor reduction measures to prevent injury from fall in the home  Outcome: Progressing  Goal: Use assistive devices by end of the shift  Outcome: Progressing  Goal: Pace activities to prevent fatigue by end of the shift  Outcome: Progressing     Problem: Safety - Adult  Goal: Free from fall injury  Outcome: Progressing     Problem: Discharge Planning  Goal: Discharge to home or other facility with appropriate resources  Outcome: Progressing

## 2024-07-10 NOTE — H&P
Medical Group History and Physical  ASSESSMENT & PLAN:      #new strokelike symptoms with recent right MCA ischemic stroke   #known PFO  - will get CT angiography if to help determine if there is any progression of occlusion of the MCA stroke.  -  Neurology consulted appreciate recommendations  -Cardiology consulted to give the recent finding of PFO on KACIE.  Would like them to weigh in on urgency of PFO repair  -  continue with permissive hypertension.  Will start correcting blood pressure tomorrow.      #Hypertension  #Hyperlipidemia  -Will continue home meds.  Holding antihypertensives for now.      VTE Prophylaxis:   Lovenox.  Patient currently refusing.  SCDs patient currently refusing.  Diet: Cardiac  CODE STATUS: Full code    >55 minutes  were spent preparing to see patient, obtaining and reviewing history, performing appropriate examination and/or evaluation, counseling and educating the patient family or caregiver, ordering medications tests or procedures, referring and communicating with other healthcare professionals, documenting clinical information in the EHR, independently interpreting results and communicating results to the patient, family or caregiver, care coordination.  > 50% of time spent counseling and/ or coordinating care      ---Of note, this documentation is completed using the Dragon Dictation system (voice recognition software). There may be spelling and/or grammatical errors that were not corrected prior to final submission.---    Yarely Dasilva MD    HISTORY OF PRESENT ILLNESS:   Chief Complaint:  new numbness and tingling on left side of the body concerning for new TIA/CVA    History Of Present Illness:    Moon Martinez is a 74 y.o. female with a significant past medical history of  recent right MCA M1 segment occlusion leading to CVA,  positive bubble study on KACIE for PFO  hypertension, hyperlipidemia who is coming in with left face numbness, slurred speech left arm numbness and  tingling similar to when she presented for stroke.   Patient does endorse not facial numbness as well as most of the deficits of slurred speech have resolved.  Still having left arm symptoms.  Does endorse some blurry vision particularly more evident on the left side.  Denies any chest pain or shortness of breath this point in time.  No symptoms in the left lower extremity. no headache     In the emergency room patient was found to have blood pressure 167/80.  Initial NIH score was 1.   CT head showed mild prominence of ventricles and sulcal cocci with mild patchy hypodensity in the periventricular white matter no signs of intra cranial bleed acutely, midline shift or focal mass effect.   Patient was admitted for further evaluation and treatment     Review of systems: 10 point review of systems is otherwise negative except as mentioned above.    PAST HISTORIES:     Past Medical History:  She has a past medical history of Personal history of other diseases of the circulatory system and Personal history of other endocrine, nutritional and metabolic disease.    Past Surgical History:  She has a past surgical history that includes Lithotripsy (03/24/2015); Other surgical history (03/24/2015); and Other surgical history (03/24/2015).      Social History:  She reports that she has never smoked. She has never used smokeless tobacco. No history on file for alcohol use and drug use.    Family History:  No family history on file.     Allergies:  Hydralazine, Hydrochlorothiazide, Lisinopril, Milk, and Morphine    OBJECTIVE:     Last Recorded Vitals:  Vitals:    07/10/24 1354 07/10/24 1425 07/10/24 1455 07/10/24 1519   BP: 165/69 180/86 154/76 (!) 186/82   BP Location:    Left arm   Patient Position:    Lying   Pulse: 60 56 52 54   Resp: 16 20 20 20   Temp:    35.5 °C (95.9 °F)   TempSrc:    Temporal   SpO2: 99% 98% 95% 97%   Weight:       Height:         Last I/O:  No intake/output data recorded.    Physical  Exam  Constitutional:       General: She is not in acute distress.  HENT:      Head: Normocephalic.   Eyes:      Extraocular Movements: Extraocular movements intact.      Pupils: Pupils are equal, round, and reactive to light.   Cardiovascular:      Rate and Rhythm: Normal rate and regular rhythm.      Pulses: Normal pulses.      Heart sounds: Normal heart sounds.   Pulmonary:      Effort: Pulmonary effort is normal.      Breath sounds: Normal breath sounds.   Abdominal:      General: Bowel sounds are normal.      Palpations: Abdomen is soft.   Musculoskeletal:         General: Normal range of motion.      Cervical back: Normal range of motion and neck supple.   Skin:     Capillary Refill: Capillary refill takes less than 2 seconds.   Neurological:      Mental Status: She is alert and oriented to person, place, and time.      Sensory: Sensory deficit (left hand forearm and arm to shoulder) present.   Psychiatric:         Mood and Affect: Mood normal.             Scheduled Medications  aspirin, 81 mg, oral, Daily  atorvastatin, 80 mg, oral, Nightly  clopidogrel, 75 mg, oral, Daily  docusate sodium, 100 mg, oral, BID  [Held by provider] enoxaparin, 40 mg, subcutaneous, q24h  melatonin, 3 mg, oral, Daily  [START ON 7/11/2024] pantoprazole, 40 mg, oral, Daily before breakfast   Or  [START ON 7/11/2024] pantoprazole, 40 mg, intravenous, Daily before breakfast      PRN Medications  PRN medications: acetaminophen **OR** acetaminophen **OR** acetaminophen, acetaminophen **OR** acetaminophen **OR** acetaminophen, dextrose, dextrose, glucagon, glucagon, ondansetron ODT **OR** ondansetron  Continuous Medications       Outpatient Medications:  Prior to Admission medications    Medication Sig Start Date End Date Taking? Authorizing Provider   amLODIPine (Norvasc) 10 mg tablet Take 1 tablet (10 mg) by mouth once daily.   Yes Historical Provider, MD   aspirin 81 mg EC tablet Take 1 tablet (81 mg) by mouth once daily.   Yes  Historical Provider, MD   atenolol (Tenormin) 100 mg tablet Take 1 tablet (100 mg) by mouth once daily.   Yes Historical Provider, MD   atorvastatin (Lipitor) 40 mg tablet Take 1 tablet (40 mg) by mouth once daily at bedtime. 7/7/24 8/6/24 Yes Wilfred SANTAMARIA MD   cholecalciferol (Vitamin D-3) 50,000 unit capsule Take 1 capsule (50,000 Units) by mouth 1 (one) time per week. Sunday   Yes Historical Provider, MD   clopidogrel (Plavix) 75 mg tablet Take 1 tablet (75 mg) by mouth once daily. Do not fill before July 8, 2024. 7/8/24 9/6/24 Yes Wilfred SANTAMARIA MD   diclofenac sodium (Voltaren) 1 % gel Apply 4.5 inches (4 g) topically 4 times a day as needed.   Yes Historical Provider, MD   losartan-hydrochlorothiazide (Hyzaar) 100-25 mg tablet Take 1 tablet by mouth once daily.   Yes Historical Provider, MD   atorvastatin (Lipitor) 10 mg tablet Take 1 tablet (10 mg) by mouth once daily.  7/7/24  Historical Provider, MD   atorvastatin (Lipitor) 40 mg tablet Take 1 tablet (40 mg) by mouth once daily at bedtime. 7/7/24 7/7/24  Wilfred SANTAMARIA MD   clopidogrel (Plavix) 75 mg tablet Take 1 tablet (75 mg) by mouth once daily. 7/8/24 7/7/24  Wilfred SANTAMARIA MD       LABS AND IMAGING:     Labs:  Results from last 7 days   Lab Units 07/10/24  1128 07/06/24  0605 07/05/24  0752   WBC AUTO x10*3/uL 4.9 5.6 5.7   RBC AUTO x10*6/uL 4.38 4.71 4.49   HEMOGLOBIN g/dL 12.6 13.5 12.8   HEMATOCRIT % 38.8 41.4 39.4   MCV fL 89 88 88   MCH pg 28.8 28.7 28.5   MCHC g/dL 32.5 32.6 32.5   RDW % 13.0 13.2 13.2   PLATELETS AUTO x10*3/uL 252 282 268     Results from last 7 days   Lab Units 07/10/24  1128 07/06/24  0605 07/05/24  0752   SODIUM mmol/L 141 137 138   POTASSIUM mmol/L 4.0 4.4 4.5   CHLORIDE mmol/L 108* 104 104   CO2 mmol/L 24 26 26   BUN mg/dL 25* 27* 25*   CREATININE mg/dL 0.91 1.02 1.03   GLUCOSE mg/dL 95 94 100*   PROTEIN TOTAL g/dL 7.3  --  7.5   CALCIUM mg/dL 9.1 9.3 9.4   BILIRUBIN TOTAL mg/dL 0.4  --  0.4   ALK PHOS U/L 78   --  74   AST U/L 23  --  17   ALT U/L 21  --  14     Results from last 7 days   Lab Units 07/05/24  0752   MAGNESIUM mg/dL 2.41*     Results from last 7 days   Lab Units 07/10/24  1128 07/05/24  0859 07/05/24  0752   TROPHS ng/L 7 5 4       Imaging:  ECG 12 lead  Sinus bradycardia  Possible Left atrial enlargement  Left ventricular hypertrophy  Septal infarct (cited on or before 05-JUL-2024)  ST & Marked T wave abnormality, consider anterolateral ischemia  Abnormal ECG  When compared with ECG of 05-JUL-2024 09:43,  Serial changes of Septal infarct Present  See ED provider note for full interpretation and clinical correlation  CT brain attack head wo IV contrast  Narrative: Interpreted By:  Shane Hyatt,   STUDY:  CT BRAIN ATTACK HEAD WO IV CONTRAST;  7/10/2024 11:15 am      INDICATION:  Signs/Symptoms:Stroke Evaluation.      COMPARISON:  Previous exam is from 07/05/2024..      ACCESSION NUMBER(S):  [id number]      ORDERING CLINICIAN:  PORSCHE BOOTHE      TECHNIQUE:  Routine axial images were obtained from the skull base through the  vertex.  Sagittal and coronal reconstruction images were generated.  Brain, subdural, and bone windows were reviewed. N/A Unavailable      FINDINGS:  INTRACRANIAL:  Mild prominence of ventricles and sulci. There is mild patchy  hypodensity throughout the deep periventricular white matter. No  acute intracranial bleed, midline shift, or focal mass effect. No  destructive bone lesion. No depressed skull fracture. Skullbase  arterial calcifications in the carotid siphons and vertebral arteries.      EXTRACRANIAL:  Visualized paranasal sinuses were clear.  Visualized mastoid air cells were clear.      Impression: No acute intracranial bleed or focal mass effect.      Mild volume loss.      Mild chronic white matter ischemic disease in the deep  periventricular regions.      MACRO:  Shane Hyatt discussed the significance and urgency of this critical  finding epic secure chat with   PORSCHE BOOTHE on 7/10/2024 at 11:25  am.  (**-RCF-**) Findings:  See findings.      Signed by: Shane Hyatt 7/10/2024 11:26 AM  Dictation workstation:   [id number]

## 2024-07-11 ENCOUNTER — APPOINTMENT (OUTPATIENT)
Dept: CARDIOLOGY | Facility: HOSPITAL | Age: 74
End: 2024-07-11
Payer: COMMERCIAL

## 2024-07-11 LAB
ALBUMIN SERPL BCP-MCNC: 4.6 G/DL (ref 3.4–5)
ALP SERPL-CCNC: 84 U/L (ref 33–136)
ALT SERPL W P-5'-P-CCNC: 22 U/L (ref 7–45)
ANION GAP SERPL CALC-SCNC: 11 MMOL/L (ref 10–20)
AST SERPL W P-5'-P-CCNC: 23 U/L (ref 9–39)
BACTERIA UR CULT: NORMAL
BILIRUB SERPL-MCNC: 0.5 MG/DL (ref 0–1.2)
BODY SURFACE AREA: 1.62 M2
BUN SERPL-MCNC: 20 MG/DL (ref 6–23)
CALCIUM SERPL-MCNC: 9.6 MG/DL (ref 8.6–10.3)
CHLORIDE SERPL-SCNC: 107 MMOL/L (ref 98–107)
CO2 SERPL-SCNC: 27 MMOL/L (ref 21–32)
CREAT SERPL-MCNC: 0.9 MG/DL (ref 0.5–1.05)
EGFRCR SERPLBLD CKD-EPI 2021: 67 ML/MIN/1.73M*2
ERYTHROCYTE [DISTWIDTH] IN BLOOD BY AUTOMATED COUNT: 13 % (ref 11.5–14.5)
EST. AVERAGE GLUCOSE BLD GHB EST-MCNC: 120 MG/DL
GLUCOSE BLD MANUAL STRIP-MCNC: 128 MG/DL (ref 74–99)
GLUCOSE BLD MANUAL STRIP-MCNC: 71 MG/DL (ref 74–99)
GLUCOSE SERPL-MCNC: 82 MG/DL (ref 74–99)
HBA1C MFR BLD: 5.8 %
HCT VFR BLD AUTO: 42.8 % (ref 36–46)
HGB BLD-MCNC: 14.1 G/DL (ref 12–16)
HOLD SPECIMEN: NORMAL
HOLD SPECIMEN: NORMAL
MCH RBC QN AUTO: 29.1 PG (ref 26–34)
MCHC RBC AUTO-ENTMCNC: 32.9 G/DL (ref 32–36)
MCV RBC AUTO: 88 FL (ref 80–100)
NRBC BLD-RTO: 0 /100 WBCS (ref 0–0)
PLATELET # BLD AUTO: 291 X10*3/UL (ref 150–450)
POTASSIUM SERPL-SCNC: 3.9 MMOL/L (ref 3.5–5.3)
PROT SERPL-MCNC: 7.9 G/DL (ref 6.4–8.2)
RBC # BLD AUTO: 4.85 X10*6/UL (ref 4–5.2)
SODIUM SERPL-SCNC: 141 MMOL/L (ref 136–145)
WBC # BLD AUTO: 5 X10*3/UL (ref 4.4–11.3)

## 2024-07-11 PROCEDURE — 2500000001 HC RX 250 WO HCPCS SELF ADMINISTERED DRUGS (ALT 637 FOR MEDICARE OP)

## 2024-07-11 PROCEDURE — 99152 MOD SED SAME PHYS/QHP 5/>YRS: CPT

## 2024-07-11 PROCEDURE — 82947 ASSAY GLUCOSE BLOOD QUANT: CPT

## 2024-07-11 PROCEDURE — 85027 COMPLETE CBC AUTOMATED: CPT | Performed by: STUDENT IN AN ORGANIZED HEALTH CARE EDUCATION/TRAINING PROGRAM

## 2024-07-11 PROCEDURE — 93320 DOPPLER ECHO COMPLETE: CPT | Performed by: INTERNAL MEDICINE

## 2024-07-11 PROCEDURE — 93325 DOPPLER ECHO COLOR FLOW MAPG: CPT | Performed by: INTERNAL MEDICINE

## 2024-07-11 PROCEDURE — 7100000010 HC PHASE TWO TIME - EACH INCREMENTAL 1 MINUTE

## 2024-07-11 PROCEDURE — 2500000001 HC RX 250 WO HCPCS SELF ADMINISTERED DRUGS (ALT 637 FOR MEDICARE OP): Performed by: INTERNAL MEDICINE

## 2024-07-11 PROCEDURE — 7100000009 HC PHASE TWO TIME - INITIAL BASE CHARGE

## 2024-07-11 PROCEDURE — 2500000005 HC RX 250 GENERAL PHARMACY W/O HCPCS: Performed by: INTERNAL MEDICINE

## 2024-07-11 PROCEDURE — 93312 ECHO TRANSESOPHAGEAL: CPT | Performed by: INTERNAL MEDICINE

## 2024-07-11 PROCEDURE — 2500000004 HC RX 250 GENERAL PHARMACY W/ HCPCS (ALT 636 FOR OP/ED): Performed by: INTERNAL MEDICINE

## 2024-07-11 PROCEDURE — 99233 SBSQ HOSP IP/OBS HIGH 50: CPT | Performed by: INTERNAL MEDICINE

## 2024-07-11 PROCEDURE — 1210000001 HC SEMI-PRIVATE ROOM DAILY

## 2024-07-11 PROCEDURE — 99223 1ST HOSP IP/OBS HIGH 75: CPT | Performed by: PSYCHIATRY & NEUROLOGY

## 2024-07-11 PROCEDURE — B24BZZ4 ULTRASONOGRAPHY OF HEART WITH AORTA, TRANSESOPHAGEAL: ICD-10-PCS | Performed by: INTERNAL MEDICINE

## 2024-07-11 PROCEDURE — 2500000001 HC RX 250 WO HCPCS SELF ADMINISTERED DRUGS (ALT 637 FOR MEDICARE OP): Performed by: STUDENT IN AN ORGANIZED HEALTH CARE EDUCATION/TRAINING PROGRAM

## 2024-07-11 PROCEDURE — 36415 COLL VENOUS BLD VENIPUNCTURE: CPT | Performed by: STUDENT IN AN ORGANIZED HEALTH CARE EDUCATION/TRAINING PROGRAM

## 2024-07-11 PROCEDURE — 84075 ASSAY ALKALINE PHOSPHATASE: CPT | Performed by: STUDENT IN AN ORGANIZED HEALTH CARE EDUCATION/TRAINING PROGRAM

## 2024-07-11 PROCEDURE — 93320 DOPPLER ECHO COMPLETE: CPT

## 2024-07-11 PROCEDURE — 99223 1ST HOSP IP/OBS HIGH 75: CPT | Performed by: INTERNAL MEDICINE

## 2024-07-11 PROCEDURE — 99152 MOD SED SAME PHYS/QHP 5/>YRS: CPT | Performed by: INTERNAL MEDICINE

## 2024-07-11 RX ORDER — FENTANYL CITRATE 50 UG/ML
INJECTION, SOLUTION INTRAMUSCULAR; INTRAVENOUS AS NEEDED
Status: DISCONTINUED | OUTPATIENT
Start: 2024-07-11 | End: 2024-07-12 | Stop reason: HOSPADM

## 2024-07-11 RX ORDER — LIDOCAINE HYDROCHLORIDE 20 MG/ML
JELLY TOPICAL AS NEEDED
Status: DISCONTINUED | OUTPATIENT
Start: 2024-07-11 | End: 2024-07-12 | Stop reason: HOSPADM

## 2024-07-11 RX ORDER — MIDAZOLAM HYDROCHLORIDE 1 MG/ML
INJECTION, SOLUTION INTRAMUSCULAR; INTRAVENOUS AS NEEDED
Status: DISCONTINUED | OUTPATIENT
Start: 2024-07-11 | End: 2024-07-12 | Stop reason: HOSPADM

## 2024-07-11 RX ORDER — OXYCODONE HYDROCHLORIDE 5 MG/1
5 TABLET ORAL EVERY 6 HOURS PRN
Status: DISCONTINUED | OUTPATIENT
Start: 2024-07-11 | End: 2024-07-12 | Stop reason: HOSPADM

## 2024-07-11 ASSESSMENT — COGNITIVE AND FUNCTIONAL STATUS - GENERAL
MOBILITY SCORE: 24
DAILY ACTIVITIY SCORE: 24

## 2024-07-11 ASSESSMENT — PAIN SCALES - GENERAL
PAINLEVEL_OUTOF10: 0 - NO PAIN
PAINLEVEL_OUTOF10: 5 - MODERATE PAIN
PAINLEVEL_OUTOF10: 0 - NO PAIN
PAINLEVEL_OUTOF10: 0 - NO PAIN
PAINLEVEL_OUTOF10: 5 - MODERATE PAIN
PAINLEVEL_OUTOF10: 0 - NO PAIN
PAINLEVEL_OUTOF10: 7
PAINLEVEL_OUTOF10: 0 - NO PAIN

## 2024-07-11 ASSESSMENT — PAIN - FUNCTIONAL ASSESSMENT: PAIN_FUNCTIONAL_ASSESSMENT: 0-10

## 2024-07-11 ASSESSMENT — PAIN DESCRIPTION - LOCATION
LOCATION: ARM
LOCATION: SHOULDER

## 2024-07-11 ASSESSMENT — PAIN DESCRIPTION - ORIENTATION
ORIENTATION: LEFT
ORIENTATION: LEFT

## 2024-07-11 ASSESSMENT — PAIN SCALES - PAIN ASSESSMENT IN ADVANCED DEMENTIA (PAINAD): TOTALSCORE: MEDICATION (SEE MAR)

## 2024-07-11 NOTE — NURSING NOTE
Rapid Response Nurse Follow Up    Discussed patient with RN, no concerns at this time. Patient reports pain in left shoulder area, otherwise VSS.

## 2024-07-11 NOTE — CARE PLAN
The patient's goals for the shift include      The clinical goals for the shift include Remain HDS    Over the shift, the patient did make progress toward the following goals.

## 2024-07-11 NOTE — CONSULTS
Inpatient consult to Neurology  Consult performed by: AYAAN Mathew-CNP  Consult ordered by: Yarely Dasilva MD          History Of Present Illness  Moon Martinez is a 74 y.o. female presenting with stroke-like symptoms of left facial droop, slurred speech and LUE numbness/weakness with resolution. History obtained from patient. She states she was at her senior living facility when she became symptomatic and was drooling out the left side of her face so she wanted to be be evaluated. She was previously diagnosed with TIA on July 5th that presented with similar symptoms and started on DAPT and andra intensity statin. CTA July 5th revealed an right MCA, M1 occlusion. She again presented to the ED yesterday with similar symptoms and TTE revealed a moderately sized PFO.   ED Course: EKG NB /80 58 16 100%ra 36.1*C CTH negative for acute infarct or bleed  Cardiology has planned KACIE this afternoon to further assess PFO. Her slurred speech and facial droop have resolved, she continues to have numbness/tingling in right hand. Endorses right sided headache.  Review of systems are negative unless otherwise specified in HPI.      Past Medical History  Past Medical History:   Diagnosis Date    Personal history of other diseases of the circulatory system     History of hypertension    Personal history of other endocrine, nutritional and metabolic disease     History of hypercholesterolemia     Surgical History  Past Surgical History:   Procedure Laterality Date    LITHOTRIPSY  03/24/2015    Renal Lithotripsy    OTHER SURGICAL HISTORY  03/24/2015    Cystoscopy With Insertion Of Ureteral Stent    OTHER SURGICAL HISTORY  03/24/2015    Abdominal Surgery     Social History  Social History     Tobacco Use    Smoking status: Never    Smokeless tobacco: Never     Allergies  Hydralazine, Hydrochlorothiazide, Lisinopril, Milk, and Morphine  Medications Prior to Admission   Medication Sig Dispense Refill Last Dose    amLODIPine  "(Norvasc) 10 mg tablet Take 1 tablet (10 mg) by mouth once daily.   Past Week    aspirin 81 mg EC tablet Take 1 tablet (81 mg) by mouth once daily.   7/10/2024 at 0805    atenolol (Tenormin) 100 mg tablet Take 1 tablet (100 mg) by mouth once daily.   Past Week    atorvastatin (Lipitor) 40 mg tablet Take 1 tablet (40 mg) by mouth once daily at bedtime. 30 tablet 0 7/9/2024 at pm    cholecalciferol (Vitamin D-3) 50,000 unit capsule Take 1 capsule (50,000 Units) by mouth 1 (one) time per week. Sunday   Past Week    clopidogrel (Plavix) 75 mg tablet Take 1 tablet (75 mg) by mouth once daily. Do not fill before July 8, 2024. 30 tablet 1 7/9/2024 at pm    diclofenac sodium (Voltaren) 1 % gel Apply 4.5 inches (4 g) topically 4 times a day as needed.   7/9/2024    losartan-hydrochlorothiazide (Hyzaar) 100-25 mg tablet Take 1 tablet by mouth once daily.   Past Week       Review of Systems  Neurological Exam  Mental Status  Awake, alert and oriented to person, place and time. Speech is normal. Language is fluent with no aphasia. Attention and concentration are normal.    Cranial Nerves  CN III, IV, VI: Pupils equal round and reactive to light bilaterally.  And EOM's Normal.    Motor   Strength is 5/5 throughout all four extremities.    Sensory  Light touch is normal in upper and lower extremities.     Reflexes  Deep tendon reflexes are 2+ and symmetric in all four extremities.    Physical Exam  Eyes:      Pupils: Pupils are equal, round, and reactive to light.   Neurological:      Motor: Motor strength is normal.     Deep Tendon Reflexes: Reflexes are normal and symmetric.   Psychiatric:         Speech: Speech normal.         Last Recorded Vitals  Blood pressure 176/76, pulse 56, temperature 36 °C (96.8 °F), temperature source Temporal, resp. rate 18, height 1.499 m (4' 11\"), weight 63 kg (138 lb 14.2 oz), SpO2 97%.    Relevant Results  Results for orders placed or performed during the hospital encounter of 07/10/24 (from the " past 24 hour(s))   CBC and Auto Differential   Result Value Ref Range    WBC 4.9 4.4 - 11.3 x10*3/uL    nRBC 0.0 0.0 - 0.0 /100 WBCs    RBC 4.38 4.00 - 5.20 x10*6/uL    Hemoglobin 12.6 12.0 - 16.0 g/dL    Hematocrit 38.8 36.0 - 46.0 %    MCV 89 80 - 100 fL    MCH 28.8 26.0 - 34.0 pg    MCHC 32.5 32.0 - 36.0 g/dL    RDW 13.0 11.5 - 14.5 %    Platelets 252 150 - 450 x10*3/uL    Neutrophils % 41.6 40.0 - 80.0 %    Immature Granulocytes %, Automated 0.4 0.0 - 0.9 %    Lymphocytes % 41.1 13.0 - 44.0 %    Monocytes % 11.4 2.0 - 10.0 %    Eosinophils % 4.3 0.0 - 6.0 %    Basophils % 1.2 0.0 - 2.0 %    Neutrophils Absolute 2.04 1.60 - 5.50 x10*3/uL    Immature Granulocytes Absolute, Automated 0.02 0.00 - 0.50 x10*3/uL    Lymphocytes Absolute 2.02 0.80 - 3.00 x10*3/uL    Monocytes Absolute 0.56 0.05 - 0.80 x10*3/uL    Eosinophils Absolute 0.21 0.00 - 0.40 x10*3/uL    Basophils Absolute 0.06 0.00 - 0.10 x10*3/uL   Comprehensive metabolic panel   Result Value Ref Range    Glucose 95 74 - 99 mg/dL    Sodium 141 136 - 145 mmol/L    Potassium 4.0 3.5 - 5.3 mmol/L    Chloride 108 (H) 98 - 107 mmol/L    Bicarbonate 24 21 - 32 mmol/L    Anion Gap 13 10 - 20 mmol/L    Urea Nitrogen 25 (H) 6 - 23 mg/dL    Creatinine 0.91 0.50 - 1.05 mg/dL    eGFR 66 >60 mL/min/1.73m*2    Calcium 9.1 8.6 - 10.3 mg/dL    Albumin 4.3 3.4 - 5.0 g/dL    Alkaline Phosphatase 78 33 - 136 U/L    Total Protein 7.3 6.4 - 8.2 g/dL    AST 23 9 - 39 U/L    Bilirubin, Total 0.4 0.0 - 1.2 mg/dL    ALT 21 7 - 45 U/L   Troponin I, High Sensitivity   Result Value Ref Range    Troponin I, High Sensitivity 7 0 - 13 ng/L   Protime-INR   Result Value Ref Range    Protime 10.9 9.8 - 12.8 seconds    INR 1.0 0.9 - 1.1   APTT   Result Value Ref Range    aPTT 31 27 - 38 seconds   Lavender Top   Result Value Ref Range    Extra Tube Hold for add-ons.    Gray Top   Result Value Ref Range    Extra Tube Hold for add-ons.    Hemoglobin A1C   Result Value Ref Range    Hemoglobin A1C  5.8 (H) see below %    Estimated Average Glucose 120 Not Established mg/dL   B-Type Natriuretic Peptide   Result Value Ref Range    BNP 28 0 - 99 pg/mL   Lipid Panel   Result Value Ref Range    Cholesterol 121 0 - 199 mg/dL    HDL-Cholesterol 47.2 mg/dL    Cholesterol/HDL Ratio 2.6     LDL Calculated 62 <=99 mg/dL    VLDL 12 0 - 40 mg/dL    Triglycerides 58 0 - 149 mg/dL    Non HDL Cholesterol 74 0 - 149 mg/dL   ECG 12 lead   Result Value Ref Range    Ventricular Rate 49 BPM    Atrial Rate 49 BPM    NY Interval 160 ms    QRS Duration 88 ms    QT Interval 498 ms    QTC Calculation(Bazett) 449 ms    P Axis 57 degrees    R Axis -3 degrees    T Axis 110 degrees    QRS Count 8 beats    Q Onset 225 ms    P Onset 145 ms    P Offset 192 ms    T Offset 474 ms    QTC Fredericia 465 ms   POCT GLUCOSE   Result Value Ref Range    POCT Glucose 91 74 - 99 mg/dL   Hepatic Function Panel   Result Value Ref Range    Albumin 4.1 3.4 - 5.0 g/dL    Bilirubin, Total 0.4 0.0 - 1.2 mg/dL    Bilirubin, Direct 0.0 0.0 - 0.3 mg/dL    Alkaline Phosphatase 76 33 - 136 U/L    ALT 20 7 - 45 U/L    AST 20 9 - 39 U/L    Total Protein 6.8 6.4 - 8.2 g/dL   Lavender Top   Result Value Ref Range    Extra Tube Hold for add-ons.    SST TOP   Result Value Ref Range    Extra Tube Hold for add-ons.    Urinalysis with Reflex Culture and Microscopic   Result Value Ref Range    Color, Urine Light-Yellow Light-Yellow, Yellow, Dark-Yellow    Appearance, Urine Clear Clear    Specific Gravity, Urine 1.022 1.005 - 1.035    pH, Urine 6.0 5.0, 5.5, 6.0, 6.5, 7.0, 7.5, 8.0    Protein, Urine NEGATIVE NEGATIVE, 10 (TRACE), 20 (TRACE) mg/dL    Glucose, Urine Normal Normal mg/dL    Blood, Urine NEGATIVE NEGATIVE    Ketones, Urine NEGATIVE NEGATIVE mg/dL    Bilirubin, Urine NEGATIVE NEGATIVE    Urobilinogen, Urine Normal Normal mg/dL    Nitrite, Urine NEGATIVE NEGATIVE    Leukocyte Esterase, Urine 250 Matilda/µL (A) NEGATIVE   Microscopic Only, Urine   Result Value Ref Range     WBC, Urine 1-5 1-5, NONE /HPF    RBC, Urine 1-2 NONE, 1-2, 3-5 /HPF    Squamous Epithelial Cells, Urine 1-9 (SPARSE) Reference range not established. /HPF    Bacteria, Urine 1+ (A) NONE SEEN /HPF    Mucus, Urine FEW Reference range not established. /LPF    Hyaline Casts, Urine OCCASIONAL (A) NONE /LPF   Drug Screen, Urine   Result Value Ref Range    Amphetamine Screen, Urine Presumptive Negative Presumptive Negative    Barbiturate Screen, Urine Presumptive Negative Presumptive Negative    Benzodiazepines Screen, Urine Presumptive Negative Presumptive Negative    Cannabinoid Screen, Urine Presumptive Negative Presumptive Negative    Cocaine Metabolite Screen, Urine Presumptive Negative Presumptive Negative    Fentanyl Screen, Urine Presumptive Negative Presumptive Negative    Opiate Screen, Urine Presumptive Negative Presumptive Negative    Oxycodone Screen, Urine Presumptive Negative Presumptive Negative    PCP Screen, Urine Presumptive Negative Presumptive Negative    Methadone Screen, Urine Presumptive Negative Presumptive Negative   Extra Urine Gray Tube   Result Value Ref Range    Extra Tube Hold for add-ons.    SST TOP   Result Value Ref Range    Extra Tube Hold for add-ons.    CBC   Result Value Ref Range    WBC 5.0 4.4 - 11.3 x10*3/uL    nRBC 0.0 0.0 - 0.0 /100 WBCs    RBC 4.85 4.00 - 5.20 x10*6/uL    Hemoglobin 14.1 12.0 - 16.0 g/dL    Hematocrit 42.8 36.0 - 46.0 %    MCV 88 80 - 100 fL    MCH 29.1 26.0 - 34.0 pg    MCHC 32.9 32.0 - 36.0 g/dL    RDW 13.0 11.5 - 14.5 %    Platelets 291 150 - 450 x10*3/uL   Comprehensive metabolic panel   Result Value Ref Range    Glucose 82 74 - 99 mg/dL    Sodium 141 136 - 145 mmol/L    Potassium 3.9 3.5 - 5.3 mmol/L    Chloride 107 98 - 107 mmol/L    Bicarbonate 27 21 - 32 mmol/L    Anion Gap 11 10 - 20 mmol/L    Urea Nitrogen 20 6 - 23 mg/dL    Creatinine 0.90 0.50 - 1.05 mg/dL    eGFR 67 >60 mL/min/1.73m*2    Calcium 9.6 8.6 - 10.3 mg/dL    Albumin 4.6 3.4 - 5.0 g/dL     Alkaline Phosphatase 84 33 - 136 U/L    Total Protein 7.9 6.4 - 8.2 g/dL    AST 23 9 - 39 U/L    Bilirubin, Total 0.5 0.0 - 1.2 mg/dL    ALT 22 7 - 45 U/L   CT angio head w and wo IV contrast    Result Date: 7/11/2024  Interpreted By:  Rocky Herman, STUDY: CT ANGIO HEAD W AND WO IV CONTRAST; ;  7/10/2024 6:58 pm   INDICATION: Signs/Symptoms:recent stroke coming in with new symptoms.   COMPARISON: None.   ACCESSION NUMBER(S): [id number]   ORDERING CLINICIAN: DEMETRIUS CABALLERO   TECHNIQUE: Thin cut axial CT images were generated over the upper thorax, neck, and head during the arterial passage of a full contrast bolus.  The bolus was generated with a power injector and followed with immediate saline flush. These image data were subtracted and then used for 3-D reconstructions. Maximum intensity projections and shaded surface displays were generated in multiple planes. In addition images were transferred into a 3-D processing program and additional projections and displays were reviewed by the interpreting physician.   The post processed 3D images from the imaged lab are not available at the time of dictation despite phone call attempts by radiology operations and myself to have the post processed.   FINDINGS: The CT angiogram through the upper thorax demonstrates no significant stenosis along the origins of the right brachiocephalic artery, left common carotid, or left subclavian artery. No significant stenosis noted along the origins of the vertebral arteries.   The CT angiogram of the neck demonstrates mild partially calcified atherosclerotic plaque along the distal left common carotid artery without significant focal stenosis. No significant stenosis noted along the left carotid bifurcation or cervical segment of the left internal carotid artery. No significant stenosis noted along the right common carotid artery, right carotid bifurcation, or cervical segment of the right internal carotid artery. No significant  stenosis noted along the cervical segments of the vertebral arteries. There is a left dominant vertebral artery.   There is mild atherosclerotic calcification noted along the bilateral carotid siphons without significant focal stenosis.  No significant stenosis noted along the distal vertebral arteries or basilar artery. There is a fetal origin of the left posterior cerebral artery. The A1 segment of the right anterior cerebral artery is not well defined which may be related to congenital hypoplasia or secondary narrowing. The more distal right anterior cerebral artery is supplied from the left via a patent anterior communicating artery. There is asymmetric diminished caliber of M2 and more distal branch vessels of the right middle cerebral artery when compared with the left.   The source CT images of the head demonstrate mild-to-moderate brain parenchymal volume loss. There are nonspecific white matter changes within the cerebral hemispheres bilaterally which while nonspecific, given the patient's age, may represent sequelae of small-vessel ischemic change. There is no midline shift. There is a retention cyst or polyp within the left maxillary sinus.   The source CT angiogram images of the neck demonstrate multilevel cervical spondylosis. There is a nonspecific nodular hypodensity noted within the inferior left lobe of the thyroid gland measuring approximately 23 mm in greatest axial dimension.   Further evaluation with nonemergent thyroid ultrasound.(managing Incidental Thyroid Nodules Detected on Imaging: White Paper of the ACR Incidental Thyroid Findings Committee. Ora Haines. et al. Journal of the American College of Radiology,Volume 12, Issue 2, 143 - 150.)       The CT angiogram through the upper thorax demonstrates no significant stenosis along the origins of the right brachiocephalic artery, left common carotid, or left subclavian artery. No significant stenosis noted along the origins of the vertebral  arteries.   The CT angiogram of the neck demonstrates mild partially calcified atherosclerotic plaque along the distal left common carotid artery without significant focal stenosis. No significant stenosis noted along the left carotid bifurcation or cervical segment of the left internal carotid artery. No significant stenosis noted along the right common carotid artery, right carotid bifurcation, or cervical segment of the right internal carotid artery. No significant stenosis noted along the cervical segments of the vertebral arteries. There is a left dominant vertebral artery.   There is mild atherosclerotic calcification noted along the bilateral carotid siphons without significant focal stenosis. No significant stenosis noted along the distal vertebral arteries or basilar artery. There is a fetal origin of the left posterior cerebral artery. The A1 segment of the right anterior cerebral artery is not well defined which may be related to congenital hypoplasia or secondary narrowing. The more distal right anterior cerebral artery is supplied from the left via a patent anterior communicating artery. There is asymmetric diminished caliber of M2 and more distal branch vessels of the right middle cerebral artery when compared with the left.   The source CT images of the head demonstrate mild-to-moderate brain parenchymal volume loss. There are nonspecific white matter changes within the cerebral hemispheres bilaterally which while nonspecific, given the patient's age, may represent sequelae of small-vessel ischemic change. Given the above CT angiogram findings and clinical history, further evaluation with a MRI of the brain with diffusion-weighted imaging could be considered for possible superimposed acute infarction as it is a more sensitive examination.   The source CT angiogram images of the neck demonstrate multilevel cervical spondylosis. There is a nonspecific nodular hypodensity noted within the inferior left  lobe of the thyroid gland measuring approximately 23 mm in greatest axial dimension.   Further evaluation with nonemergent thyroid ultrasound.(managing Incidental Thyroid Nodules Detected on Imaging: White Paper of the ACR Incidental Thyroid Findings Committee. Ora Haines et al. Journal of the American College of Radiology,Volume 12, Issue 2, 143 - 150.)     MACRO: Critical Finding:  See findings. Notification was initiated on 7/11/2024 at 8:50 am by  Rocky Herman.  (**-OCF-**)   Signed by: Rocky Herman 7/11/2024 8:51 AM Dictation workstation:   [id number]    ECG 12 lead    Result Date: 7/10/2024  Sinus bradycardia Possible Left atrial enlargement Left ventricular hypertrophy Septal infarct (cited on or before 05-JUL-2024) ST & Marked T wave abnormality, consider anterolateral ischemia Abnormal ECG When compared with ECG of 05-JUL-2024 09:43, Serial changes of Septal infarct Present See ED provider note for full interpretation and clinical correlation    CT brain attack head wo IV contrast    Result Date: 7/10/2024  Interpreted By:  Shane Hyatt, STUDY: CT BRAIN ATTACK HEAD WO IV CONTRAST;  7/10/2024 11:15 am   INDICATION: Signs/Symptoms:Stroke Evaluation.   COMPARISON: Previous exam is from 07/05/2024..   ACCESSION NUMBER(S): [id number]   ORDERING CLINICIAN: ROCKY BOOTHE   TECHNIQUE: Routine axial images were obtained from the skull base through the vertex.  Sagittal and coronal reconstruction images were generated. Brain, subdural, and bone windows were reviewed. N/A Unavailable   FINDINGS: INTRACRANIAL: Mild prominence of ventricles and sulci. There is mild patchy hypodensity throughout the deep periventricular white matter. No acute intracranial bleed, midline shift, or focal mass effect. No destructive bone lesion. No depressed skull fracture. Skullbase arterial calcifications in the carotid siphons and vertebral arteries.   EXTRACRANIAL: Visualized paranasal sinuses were clear. Visualized  mastoid air cells were clear.       No acute intracranial bleed or focal mass effect.   Mild volume loss.   Mild chronic white matter ischemic disease in the deep periventricular regions.   MACRO: Shane Hyatt discussed the significance and urgency of this critical finding epic secure chat with  PORSCHE BOOTHE on 7/10/2024 at 11:25 am.  (**-RCF-**) Findings:  See findings.   Signed by: Shane Hyatt 7/10/2024 11:26 AM Dictation workstation:   [id number]    XR shoulder left 2+ views    Result Date: 7/9/2024  * * *Final Report* * * DATE OF EXAM: Jul 9 2024  1:35PM   CHX   5254  -  XR SHOULDER 2V AP/TRUE AP LT  / ACCESSION #  [id number] PROCEDURE REASON: M25.512      * * * * Physician Interpretation * * * *  SHOULDER RADIOGRAPHS -  LEFT HISTORY:  M25.512 TECHNOLOGIST PROVIDED HISTORY (if applicable): LEFT SHOULDER PAIN IN FRONT AND BACK OF JOINT. CANNOT LIFT ARM WITHOUT A SHOOTING PAIN IN THE JOINT TECHNIQUE: XR SHOULDER 2V AP/TRUE AP LT COMPARISON: Previous radiographs dated 05/30/2019 RESULT: Left shoulder:  The glenohumeral joint demonstrates mild arthrosis with small marginal osteophytes. The acromioclavicular joint demonstrates mild hypertrophic osteoarthrosis. Soft tissues appear within normal limits.    IMPRESSION: 1.  Mild osteoarthrosis of the left shoulder : DOMITILA   Transcribe Date/Time: Jul 9 2024  2:02P Dictated by : TOMASZ HENSON MD This examination was interpreted and the report reviewed and electronically signed by: TOMASZ HENOSN MD on Jul 9 2024  2:03PM  EST   Scheduled medications   Medication Dose Route Frequency    aspirin  81 mg oral Daily    atenolol  100 mg oral Daily    atorvastatin  80 mg oral Nightly    clopidogrel  75 mg oral Daily    docusate sodium  100 mg oral BID    [Held by provider] enoxaparin  40 mg subcutaneous q24h    [START ON 7/14/2024] ergocalciferol  1,250 mcg oral Every Sunday    melatonin  3 mg oral Daily    pantoprazole  40 mg oral Daily before breakfast     Or    pantoprazole  40 mg intravenous Daily before breakfast     PRN medications   Medication    acetaminophen    Or    acetaminophen    Or    acetaminophen    acetaminophen    Or    acetaminophen    Or    acetaminophen    dextrose    dextrose    diclofenac sodium    glucagon    glucagon    ondansetron ODT    Or    ondansetron           NIH Stroke Scale  1A. Level of Consciousness: Alert, Keenly Responsive  1B. Ask Month and Age: Both Questions Right  1C. Blink Eyes & Squeeze Hands: Performs Both Tasks  2. Best Gaze: Normal  3. Visual: No Visual Loss  4. Facial Palsy: Normal Symmetrical Movements  5A. Motor - Left Arm: No Drift  5B. Motor - Right Arm: No Drift  6A. Motor - Left Leg: No Drift  6B. Motor - Right Leg: No Drift  7. Limb Ataxia: Absent  8. Sensory Loss: Mild-to-Moderate Sensory Loss  9. Best Language: No Aphasia  10. Dysarthria: Normal  11. Extinction and Inattention: No Abnormality  NIH Stroke Scale: 1           Jada Coma Scale  Best Eye Response: Spontaneous  Best Verbal Response: Oriented  Best Motor Response: Follows commands  Jada Coma Scale Score: 15                 I have personally reviewed the following imaging results CT angio head w and wo IV contrast    Result Date: 7/11/2024  Interpreted By:  Rocky Herman, STUDY: CT ANGIO HEAD W AND WO IV CONTRAST; ;  7/10/2024 6:58 pm   INDICATION: Signs/Symptoms:recent stroke coming in with new symptoms.   COMPARISON: None.   ACCESSION NUMBER(S): [id number]   ORDERING CLINICIAN: DEMETRIUS CABALLERO   TECHNIQUE: Thin cut axial CT images were generated over the upper thorax, neck, and head during the arterial passage of a full contrast bolus.  The bolus was generated with a power injector and followed with immediate saline flush. These image data were subtracted and then used for 3-D reconstructions. Maximum intensity projections and shaded surface displays were generated in multiple planes. In addition images were transferred into a 3-D processing  program and additional projections and displays were reviewed by the interpreting physician.   The post processed 3D images from the imaged lab are not available at the time of dictation despite phone call attempts by radiology operations and myself to have the post processed.   FINDINGS: The CT angiogram through the upper thorax demonstrates no significant stenosis along the origins of the right brachiocephalic artery, left common carotid, or left subclavian artery. No significant stenosis noted along the origins of the vertebral arteries.   The CT angiogram of the neck demonstrates mild partially calcified atherosclerotic plaque along the distal left common carotid artery without significant focal stenosis. No significant stenosis noted along the left carotid bifurcation or cervical segment of the left internal carotid artery. No significant stenosis noted along the right common carotid artery, right carotid bifurcation, or cervical segment of the right internal carotid artery. No significant stenosis noted along the cervical segments of the vertebral arteries. There is a left dominant vertebral artery.   There is mild atherosclerotic calcification noted along the bilateral carotid siphons without significant focal stenosis.  No significant stenosis noted along the distal vertebral arteries or basilar artery. There is a fetal origin of the left posterior cerebral artery. The A1 segment of the right anterior cerebral artery is not well defined which may be related to congenital hypoplasia or secondary narrowing. The more distal right anterior cerebral artery is supplied from the left via a patent anterior communicating artery. There is asymmetric diminished caliber of M2 and more distal branch vessels of the right middle cerebral artery when compared with the left.   The source CT images of the head demonstrate mild-to-moderate brain parenchymal volume loss. There are nonspecific white matter changes within the  cerebral hemispheres bilaterally which while nonspecific, given the patient's age, may represent sequelae of small-vessel ischemic change. There is no midline shift. There is a retention cyst or polyp within the left maxillary sinus.   The source CT angiogram images of the neck demonstrate multilevel cervical spondylosis. There is a nonspecific nodular hypodensity noted within the inferior left lobe of the thyroid gland measuring approximately 23 mm in greatest axial dimension.   Further evaluation with nonemergent thyroid ultrasound.(managing Incidental Thyroid Nodules Detected on Imaging: White Paper of the ACR Incidental Thyroid Findings Committee. Ora Haines. et al. Journal of the American College of Radiology,Volume 12, Issue 2, 143 - 150.)       The CT angiogram through the upper thorax demonstrates no significant stenosis along the origins of the right brachiocephalic artery, left common carotid, or left subclavian artery. No significant stenosis noted along the origins of the vertebral arteries.   The CT angiogram of the neck demonstrates mild partially calcified atherosclerotic plaque along the distal left common carotid artery without significant focal stenosis. No significant stenosis noted along the left carotid bifurcation or cervical segment of the left internal carotid artery. No significant stenosis noted along the right common carotid artery, right carotid bifurcation, or cervical segment of the right internal carotid artery. No significant stenosis noted along the cervical segments of the vertebral arteries. There is a left dominant vertebral artery.   There is mild atherosclerotic calcification noted along the bilateral carotid siphons without significant focal stenosis. No significant stenosis noted along the distal vertebral arteries or basilar artery. There is a fetal origin of the left posterior cerebral artery. The A1 segment of the right anterior cerebral artery is not well defined which  may be related to congenital hypoplasia or secondary narrowing. The more distal right anterior cerebral artery is supplied from the left via a patent anterior communicating artery. There is asymmetric diminished caliber of M2 and more distal branch vessels of the right middle cerebral artery when compared with the left.   The source CT images of the head demonstrate mild-to-moderate brain parenchymal volume loss. There are nonspecific white matter changes within the cerebral hemispheres bilaterally which while nonspecific, given the patient's age, may represent sequelae of small-vessel ischemic change. Given the above CT angiogram findings and clinical history, further evaluation with a MRI of the brain with diffusion-weighted imaging could be considered for possible superimposed acute infarction as it is a more sensitive examination.   The source CT angiogram images of the neck demonstrate multilevel cervical spondylosis. There is a nonspecific nodular hypodensity noted within the inferior left lobe of the thyroid gland measuring approximately 23 mm in greatest axial dimension.   Further evaluation with nonemergent thyroid ultrasound.(managing Incidental Thyroid Nodules Detected on Imaging: White Paper of the ACR Incidental Thyroid Findings Committee. Ora Haines. et al. Journal of the American College of Radiology,Volume 12, Issue 2, 143 - 150.)     MACRO: Critical Finding:  See findings. Notification was initiated on 7/11/2024 at 8:50 am by  Rocky Herman.  (**-OCF-**)   Signed by: Rocky Herman 7/11/2024 8:51 AM Dictation workstation:   [id number]    ECG 12 lead    Result Date: 7/10/2024  Sinus bradycardia Possible Left atrial enlargement Left ventricular hypertrophy Septal infarct (cited on or before 05-JUL-2024) ST & Marked T wave abnormality, consider anterolateral ischemia Abnormal ECG When compared with ECG of 05-JUL-2024 09:43, Serial changes of Septal infarct Present See ED provider note for full  interpretation and clinical correlation    CT brain attack head wo IV contrast    Result Date: 7/10/2024  Interpreted By:  Sahne Hyatt, STUDY: CT BRAIN ATTACK HEAD WO IV CONTRAST;  7/10/2024 11:15 am   INDICATION: Signs/Symptoms:Stroke Evaluation.   COMPARISON: Previous exam is from 07/05/2024..   ACCESSION NUMBER(S): [id number]   ORDERING CLINICIAN: PORSCHE BOOTHE   TECHNIQUE: Routine axial images were obtained from the skull base through the vertex.  Sagittal and coronal reconstruction images were generated. Brain, subdural, and bone windows were reviewed. N/A Unavailable   FINDINGS: INTRACRANIAL: Mild prominence of ventricles and sulci. There is mild patchy hypodensity throughout the deep periventricular white matter. No acute intracranial bleed, midline shift, or focal mass effect. No destructive bone lesion. No depressed skull fracture. Skullbase arterial calcifications in the carotid siphons and vertebral arteries.   EXTRACRANIAL: Visualized paranasal sinuses were clear. Visualized mastoid air cells were clear.       No acute intracranial bleed or focal mass effect.   Mild volume loss.   Mild chronic white matter ischemic disease in the deep periventricular regions.   MACRO: Shane Hyatt discussed the significance and urgency of this critical finding epic secure chat with  PORSCHE BOOTHE on 7/10/2024 at 11:25 am.  (**-RCF-**) Findings:  See findings.   Signed by: Shane Hyatt 7/10/2024 11:26 AM Dictation workstation:   [id number]    XR shoulder left 2+ views    Result Date: 7/9/2024  * * *Final Report* * * DATE OF EXAM: Jul 9 2024  1:35PM   CHX   5254  -  XR SHOULDER 2V AP/TRUE AP LT  / ACCESSION #  [id number] PROCEDURE REASON: M25.512      * * * * Physician Interpretation * * * *  SHOULDER RADIOGRAPHS -  LEFT HISTORY:  M25.512 TECHNOLOGIST PROVIDED HISTORY (if applicable): LEFT SHOULDER PAIN IN FRONT AND BACK OF JOINT. CANNOT LIFT ARM WITHOUT A SHOOTING PAIN IN THE JOINT TECHNIQUE: XR SHOULDER 2V  AP/TRUE AP LT COMPARISON: Previous radiographs dated 05/30/2019 RESULT: Left shoulder:  The glenohumeral joint demonstrates mild arthrosis with small marginal osteophytes. The acromioclavicular joint demonstrates mild hypertrophic osteoarthrosis. Soft tissues appear within normal limits.    IMPRESSION: 1.  Mild osteoarthrosis of the left shoulder : DOMITILA   Transcribe Date/Time: Jul 9 2024  2:02P Dictated by : TOMASZ HENSON MD This examination was interpreted and the report reviewed and electronically signed by: TOMASZ HENSON MD on Jul 9 2024  2:03PM  EST    ECG 12 lead    Result Date: 7/7/2024  Normal sinus rhythm Possible Left atrial enlargement Left ventricular hypertrophy Cannot rule out Septal infarct (cited on or before 05-JUL-2024) ST & Marked T wave abnormality, consider inferolateral ischemia Abnormal ECG When compared with ECG of 15-JUL-2022 13:25, Previous ECG has undetermined rhythm, needs review Serial changes of Septal infarct Present See ED provider note for full interpretation and clinical correlation Confirmed by Smith Vasquez ([id number]) on 7/7/2024 9:51:19 AM    ECG 12 lead    Result Date: 7/7/2024  Normal sinus rhythm Possible Left atrial enlargement Left ventricular hypertrophy Cannot rule out Septal infarct (cited on or before 05-JUL-2024) ST & Marked T wave abnormality, consider lateral ischemia Abnormal ECG When compared with ECG of 05-JUL-2024 07:51, (unconfirmed) T wave inversion no longer evident in Inferior leads See ED provider note for full interpretation and clinical correlation Confirmed by Smith Vasquez ([id number]) on 7/7/2024 9:50:50 AM    Lower extremity venous duplex bilateral    Result Date: 7/6/2024  Interpreted By:  Schoenberger, Joseph, STUDY: [facility] LOWER EXTREMITY VENOUS DUPLEX BILATERAL;  7/6/2024 3:13 pm   INDICATION: Signs/Symptoms:TIA, with PFO, r/o lower extremity DVT.   COMPARISON: None.   ACCESSION NUMBER(S): [id number]   ORDERING CLINICIAN: JOHN  CARLOS   TECHNIQUE: Vascular ultrasound of the bilateral lower extremities was performed. Real-time compression views as well as Gray scale, color Doppler and spectral Doppler waveform analysis was performed.   FINDINGS: Evaluation of the visualized portions of the bilateral common femoral vein, proximal, mid, and distal femoral vein, and popliteal vein were performed.  Evaluation of the visualized portions of the  posterior tibial and peroneal veins were also performed.   Limitations:  None   The evaluated veins demonstrate normal compressibility. There is intact venous flow demonstrating normal respiratory variability and normal augmentation of flow with calf compression. Therefore, there is no ultrasonographic evidence for deep vein thrombosis within the evaluated veins.   Respiratory variation and augmentation to calf pressure was noted.       No sonographic findings suggesting right or left lower extremity deep venous thrombosis.   MACRO: None   Signed by: Joseph Schoenberger 7/6/2024 3:26 PM Dictation workstation:   [id number]    Transthoracic Echo (TTE) Complete    Result Date: 7/6/2024          [place] [id number]  Tel [phone] Fax [phone] TRANSTHORACIC ECHOCARDIOGRAM REPORT Patient Name:      VICTOR M Casanova Physician:    [id number] Nathan Lee MD, [facility] Study Date:        7/6/2024              Ordering Provider:    [id number] JOHN GONZALEZ MRN/PID:           [id number]              Fellow: Accession#:        [id number]          Nurse: Date of Birth/Age: 1950 / 74 years   Sonographer:          Jenn Tinoco RDCS Gender:            F                     Additional Staff: Height:             149.86 cm             Admit Date:           7/5/2024 Weight:            57.61 kg              Admission Status:     Inpatient -                                                                Routine BSA / BMI:         1.52 m2 / 25.65 kg/m2 Department Location:  [address] [place] [address] Blood Pressure: 134 /71 mmHg Study Type:    TRANSTHORACIC ECHO (TTE) COMPLETE Diagnosis/ICD: 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 Study Detail: The following Echo studies were performed: M-Mode, 2D, Doppler and               color flow. Technically challenging study due to body habitus.               Agitated saline used as a contrast agent for intraseptal flow               evaluation and Definity used as a contrast agent for endocardial               border definition. Total contrast used for this procedure was 4 mL               via IV push.  PHYSICIAN INTERPRETATION: Left Ventricle: The left ventricular systolic function is normal, with a visually estimated ejection fraction of 65-70%. There are no regional wall motion abnormalities. The left ventricular cavity size is normal. The left ventricular septal wall thickness is moderately increased. There is moderately increased left ventricular posterior wall thickness. Spectral Doppler shows an impaired relaxation pattern of left ventricular diastolic filling. Left Atrium: The left atrium is normal in size. A bubble study using agitated saline was performed. Bubble study is positive. A moderate PFO (11-20 bubbles) was demonstrated. Left atrial volume index is 11.2 ml/m2. Right Ventricle: The right ventricle is normal in size. There is mildly increased right ventriclar wall thickness. There is normal right ventricular global systolic function. Normal right ventricular chamber size and function. Right  Atrium: The right atrium is normal in size. Aortic Valve: The aortic valve is trileaflet. The aortic valve dimensionless index is 0.88. There is no evidence of aortic valve regurgitation. The peak instantaneous gradient of the aortic valve is 13.0 mmHg. The mean gradient of the aortic valve is 6.0 mmHg. Mitral Valve: The mitral valve is normal in structure. There is mild mitral valve regurgitation. Mild (+) mitral regurgitation. Tricuspid Valve: The tricuspid valve is structurally normal. No evidence of tricuspid regurgitation. Trivial tricuspid regurgitation with estimated RVSP 13 mmHg. Pulmonic Valve: The pulmonic valve is structurally normal. There is no indication of pulmonic valve regurgitation. Pericardium: There is no pericardial effusion noted. Aorta: The aortic root is normal. Systemic Veins: The inferior vena cava was not well visualized.  CONCLUSIONS:  1. The left ventricular systolic function is normal, with a visually estimated ejection fraction of 65-70%.  2. Spectral Doppler shows an impaired relaxation pattern of left ventricular diastolic filling.  3. Moderately increased left ventricular septal thickness.  4. The left ventricular posterior wall thickness is moderately increased.  5. There is normal right ventricular global systolic function.  6. Mild (+) mitral regurgitation.  7. Trivial tricuspid regurgitation with estimated RVSP 13 mmHg.  8. No previous available for comparison.  9. A bubble study using agitated saline was performed. Bubble study is positive. A moderate PFO (11-20 bubbles) was demonstrated. QUANTITATIVE DATA SUMMARY: 2D MEASUREMENTS:                           Normal Ranges: Ao Root d:     2.13 cm    (2.0-3.7cm) LAs:           3.46 cm    (2.7-4.0cm) IVSd:          1.37 cm    (0.6-1.1cm) LVPWd:         1.38 cm    (0.6-1.1cm) LVIDd:         3.82 cm    (3.9-5.9cm) LVIDs:         2.40 cm LV Mass Index: 144.9 g/m2 LV % FS        37.4 % LA VOLUME:                               Normal  Ranges: LA Vol A4C:        17.0 ml    (22+/-6mL/m2) LA Vol A2C:        16.7 ml LA Vol BP:         17.3 ml LA Vol Index A4C:  11.2ml/m2 LA Vol Index A2C:  11.0 ml/m2 LA Vol Index BP:   11.4 ml/m2 LA Area A4C:       8.6 cm2 LA Area A2C:       8.3 cm2 LA Major Axis A4C: 3.7 cm LA Major Axis A2C: 3.5 cm LA Volume Index:   10.9 ml/m2 RA VOLUME BY A/L METHOD:                              Normal Ranges: RA Vol A4C:        13.6 ml   (8.3-19.5ml) RA Vol Index A4C:  9.0 ml/m2 RA Area A4C:       7.6 cm2 RA Major Axis A4C: 3.6 cm AORTA MEASUREMENTS:                    Normal Ranges: Asc Ao, d: 2.99 cm (2.1-3.4cm) LV SYSTOLIC FUNCTION BY 2D PLANIMETRY (MOD):                      Normal Ranges: EF-A4C View:    73 % (>=55%) EF-A2C View:    68 % EF-Biplane:     70 % EF-Visual:      68 % LV EF Reported: 68 % LV DIASTOLIC FUNCTION:                         Normal Ranges: MV Peak E:    0.59 m/s  (0.7-1.2 m/s) MV Peak A:    0.62 m/s  (0.42-0.7 m/s) E/A Ratio:    0.95      (1.0-2.2) MV e'         0.058 m/s (>8.0) MV lateral e' 0.06 m/s MV medial e'  0.06 m/s E/e' Ratio:   10.15     (<8.0) MITRAL VALVE:                 Normal Ranges: MV DT: 227 msec (150-240msec) AORTIC VALVE:                                    Normal Ranges: AoV Vmax:                1.80 m/s  (<=1.7m/s) AoV Peak P.0 mmHg (<20mmHg) AoV Mean P.0 mmHg  (1.7-11.5mmHg) LVOT Max Keo:            1.46 m/s  (<=1.1m/s) AoV VTI:                 26.50 cm  (18-25cm) LVOT VTI:                23.30 cm LVOT Diameter:           1.95 cm   (1.8-2.4cm) AoV Area, VTI:           2.63 cm2  (2.5-5.5cm2) AoV Area,Vmax:           2.42 cm2  (2.5-4.5cm2) AoV Dimensionless Index: 0.88  RIGHT VENTRICLE: RV Basal 2.41 cm RV Mid   2.35 cm RV Major 6.0 cm TAPSE:   16.0 mm RV s'    0.14 m/s TRICUSPID VALVE/RVSP:                             Normal Ranges: Peak TR Velocity: 1.60 m/s RV Syst Pressure: 13.2 mmHg (< 30mmHg) PULMONIC VALVE:                         Normal  Ranges: PV Accel Time: 92 msec  (>120ms) PV Max Keo:    1.3 m/s  (0.6-0.9m/s) PV Max P.1 mmHg  [id number] Nathan Lee MD, [facility] Electronically signed on 2024 at 11:59:25 AM  ** Final **     MR brain wo IV contrast    Result Date: 2024  Interpreted By:  Gareth Rossi, STUDY: MR BRAIN WO IV CONTRAST   INDICATION: Signs/Symptoms:TIA   COMPARISON: Head CT 2024.   ACCESSION NUMBER(S): [id number]   ORDERING CLINICIAN: JOHN GONZALEZ   TECHNIQUE: Multi-planar multi-sequential MR imaging of the brain was performed without intravenous contrast.   FINDINGS:   No acute infarction, intracranial hemorrhage or mass. Moderate microangiopathic disease.   No hydrocephalus.  No extra-axial fluid collections.   The skull base flow voids are present.   The visualized intraorbital contents are normal.   Mild mucosal disease of the ethmoid air cells. Trace left mastoid effusion.   The visualized osseous structures, soft tissues and partially visualized parotid glands appear normal.       No acute intracranial abnormality. Moderate microangiopathic disease.   Signed by: Gareth Rossi 2024 6:19 PM Dictation workstation:   [id number]    CT angio head and neck w and wo IV contrast    Result Date: 2024  Interpreted By:  Jimenez Martinez, STUDY: CT ANGIO HEAD AND NECK W AND WO IV CONTRAST performed 2024   INDICATION: Signs/Symptoms:HA, c/f dissection   COMPARISON: CT head dated 2024.   ACCESSION NUMBER(S): [id number]   ORDERING CLINICIAN: MATHEW CULLEN   TECHNIQUE: Axial CTA imaging was obtained through the head and neck following the administration of 75 ML Omnipaque 350 intravenous contrast. Coronal, sagittal, MIP, and 3D reconstructions were obtained. 3D reconstructions were rendered using a separate 3D workstation.   Motion artifact degrades assessment.   FINDINGS: NECK:   No suspicious lymphadenopathy. Patent airway. Salivary glands are unremarkable. Multinodular thyroid gland with dominant 2.0 cm  nodule involving the inferior aspect of the left lobe of the thyroid gland. No definite acute osseous abnormality of the cervical spine. Moderate to advanced cervical spondylosis. Mild biapical pleuroparenchymal scarring.   VASCULAR FINDINGS:   Aorta and subclavian arteries:Brachiocephalic and left common carotid artery share a common origin, an anatomic variant. Subclavian arteries are patent without flow significant stenosis.   Common carotid arteries: Allowing for motion artifact, patent bilaterally without focal stenosis.   External carotid arteries: Patent bilaterally.   Internal carotid arteries: Patent bilaterally. There is small calcified atherosclerotic plaque of the left carotid bifurcation/left internal carotid artery origin. There is no NASCET stenosis bilaterally. There is also trace atherosclerotic involvement of the parasellar intracranial internal carotid artery segments bilaterally without stenosis or definite aneurysm.   Anterior cerebral arteries: Right A1 segment is hypoplastic versus absent. Otherwise expected enhancement of the ACAs bilaterally.   Middle cerebral arteries: Patent appearing bilaterally. There is moderate stenosis involving the right MCA distal M1 and extending to the M2 segment origins (series 401, image 513 and series 404, image 84, for example). The left MCA appears patent without proximal stenosis.   Vertebral arteries: Patent flow signal bilaterally. Mild tortuosity of the V1 segments bilaterally. Otherwise normal enhancement.   Basilar artery: Normal.   Posterior communicating arteries: Visualized on the left, not well visualized on the right.   Posterior cerebral arteries: Near fetal origin of the left PCA. Normal bilaterally.       Motion artifact mildly degrades assessment. No evidence of source vessel arterial occlusion within the head and neck.   There is concern for moderate stenosis involving the right MCA distal M1 segment extending to the proximal M2 branches.  Suspected hypoplastic versus absent right A1 segment. Otherwise no additional flow significant stenosis within the head and neck identified.   No evidence of dissection. No NASCET stenosis of the internal carotid artery origins.   2.0 cm diameter nodule suggested within the inferior aspect of the left lobe of the thyroid gland; recommend nonemergent ultrasound for further characterization.   MACRO: Incidental Finding:  There are few small hypoattenuating nodules measuring equal to or greater than 1.5 cm in the thyroid gland. (**-YCF-**)   Instructions:  Further evaluation with nonemergent thyroid ultrasound. (Managing Incidental Thyroid Nodules Detected on Imaging: White Paper of the ACR Incidental Thyroid Findings Committee. Ora Haines. et al. Journal of the American College of Radiology,Volume 12, Issue 2, 143 - 150.) [email or web address]   Signed by: Jimenez Martinez 7/5/2024 10:04 AM Dictation workstation:   [id number]    XR chest 1 view    Result Date: 7/5/2024  STUDY: Chest Radiograph;  7/5/2024 7:18AM INDICATION: Chest pain. COMPARISON: None Available. ACCESSION NUMBER(S): [id number] ORDERING CLINICIAN: MATHEW CULLEN TECHNIQUE:  Frontal chest was obtained at 8:18 hours. FINDINGS: CARDIOMEDIASTINAL SILHOUETTE: Cardiomediastinal silhouette is normal in size and configuration.  LUNGS: Lungs are clear.  ABDOMEN: No remarkable upper abdominal findings.  BONES: No acute osseous changes.    No radiographic evidence of acute cardiopulmonary disease. Signed by Raphael Nelson MD    CT head wo IV contrast    Result Date: 7/5/2024  Interpreted By:  Nicole Kapoor, STUDY: CT HEAD WO IV CONTRAST;  7/5/2024 7:48 am   INDICATION: Signs/Symptoms:R side headache, transient slurred speech.   COMPARISON: 01/20/2015   ACCESSION NUMBER(S): [id number]   ORDERING CLINICIAN: MATHEW CULLEN   TECHNIQUE: Noncontrast axial CT scan of head was performed. Angled reformats in brain and bone windows and coronal and sagittal reformats in  brain window were generated.   FINDINGS: CSF Spaces: The ventricles, sulci and basal cisterns are within normal limits. There is no extraaxial fluid collection.   Parenchyma:  The grey-white differentiation is intact. There is no mass effect or midline shift.  There is no intracranial hemorrhage.   Calvarium: The calvarium is unremarkable.   Paranasal sinuses and mastoids: Mild mucoperiosteal thickening is noted in ethmoid air cells. Paranasal sinuses and mastoid air cells are otherwise clear.       No evidence of acute cortical infarct or intracranial hemorrhage.   No evidence of intracranial hemorrhage or displaced skull fracture.   MACRO: None.   Signed by: Nicole Kapoor 7/5/2024 8:20 AM Dictation workstation:   [id number]  .      Assessment/Plan   Principal Problem:    Stroke-like symptoms      Impression:  Stroke-like symptoms with resolution  Moderately sized PFO found on TTE  Known right MCA M1 occlusion  History of HTN, HLD  Recommendations:   Will change Plavix to Eliquis for anticoagulation  Continue with statin  KACIE this afternoon    Discussed bleeding precautions with patient while on anti platelet and/or anticoagulation therapy. Discussed stroke prevention such as: HgbA1c <7, tight blood pressure control <130/<80, LDL <70 with statin therapy (if indicated), CPAP/BiPAP compliance (if indicated) and lifestyle modification (Mediterranean diet, daily exercise, nicotine cessation & limiting alcohol use).   Discussed s/sx of stroke such as: face drooping, arm/leg weakness, speech difficulty; sudden numbness of face/extremity, sudden confusion, sudden trouble seeing, sudden trouble walking, sudden severe headache, dizziness, loss of balance or coordination and to call 911 immediately.?        AYAAN Mathew-CNP    Patient seen and examined.  Agree with the plan.  Will continue with Eliquis and aspirin and may be try to get patient into a [place] trial.  Patient was on aspirin and Plavix and had a  recurrence of the symptoms.  Will have a KACIE since patient was diagnosed with a PFO and will defer the closure to cardiologist.  The signs symptoms a risk factor for CVA bleeding risk and the precautions were discussed.  Will try to control the blood pressure and to resume all the blood pressure medicines once patient is back home    Due to technical limitations of voice recognition and human error, this note may not accurately reflect the care of the patient.    Gurwinder Lee MD/Neurology.

## 2024-07-11 NOTE — PROGRESS NOTES
07/11/24 1011   Discharge Planning   Living Arrangements Alone   Support Systems Friends/neighbors   Assistance Needed none   Type of Residence Private residence   Number of Stairs to Enter Residence 0   Number of Stairs Within Residence 0   Do you have animals or pets at home? No   Who is requesting discharge planning? Provider   Home or Post Acute Services None   Expected Discharge Disposition Home   Does the patient need discharge transport arranged? No   Patient Choice   Provider Choice list and CMS website (https://medicare.gov/care-compare#search) for post-acute Quality and Resource Measure Data were provided and reviewed with: Patient   Patient / Family choosing to utilize agency / facility established prior to hospitalization No     Patient is from home, admitted with stroke like symptoms. Patient is alert and oriented, states she plans on going home, lives in a senior apartment complex. Will continue to follow for further discharge needs.

## 2024-07-11 NOTE — POST-PROCEDURE NOTE
Physician Transition of Care Summary  Invasive Cardiovascular Lab    Procedure Date: 07/11/24     Pre Procedure Indications:   Recurrent TIA/CVA with positive bubble study on transthoracic echo for further evaluation    Post Procedure Diagnosis:   Moderate sized PFO with predominantly right to left shunt    Procedure(s):   Transesophageal echocardiogram    Procedure Findings:   Normal ventilatory function with EF of 55 to 60%.  There is moderate concentric left ventricular hypertrophy.  Mild left atrial enlargement.  Trileaflet aortic valve with no stenosis or regurgitation.  Mildly thickened mitral valve with trace to mild mitral regurgitation  Structurally normal tricuspid valve, with trace to mild tricuspid regurgitation.  Positive bubble study with right-to-left shunt and bubbles are greater than 10, consistent with moderate PFO.  Mild plaque in the descending thoracic aorta    Description of the Procedure:   Transesophageal echocardiogram    Complications:   None    Estimated Blood Loss:   None    Anesthesia: Conscious sedation     any Specimen(s) Removed: None      Electronically signed by: Smith Vasquez MD, 7/11/2024

## 2024-07-11 NOTE — PROGRESS NOTES
Physical Therapy                 Therapy Communication Note    Patient Name: Moon Martinez  MRN: [id number]  Today's Date: 7/11/2024     Discipline: Physical Therapy    Missed Visit Reason: Received order for P.T. eval. Chart reviewed. Attempted to see Pt. for P.T. eval. Pt. reported she has been up in room independently.  Pt. was observed amb to/from bathroom independently without assistive device and without gait deviations. Pt. stated she has no concerns about returning home at discharge and declined P.T. eval. Will discharge P.T. eval.

## 2024-07-11 NOTE — CARE PLAN
The patient's goals for the shift include      The clinical goals for the shift include remain HD stable      Problem: Fall/Injury  Goal: Not fall by end of shift  7/10/2024 2009 by Mariam Serna RN  Outcome: Progressing  7/10/2024 1626 by Mariam Serna RN  Outcome: Progressing  Goal: Be free from injury by end of the shift  7/10/2024 2009 by Mariam Serna RN  Outcome: Progressing  7/10/2024 1626 by Mariam Serna RN  Outcome: Progressing  Goal: Verbalize understanding of personal risk factors for fall in the hospital  7/10/2024 2009 by Mariam Serna RN  Outcome: Progressing  7/10/2024 1626 by Mariam Serna RN  Outcome: Progressing  Goal: Verbalize understanding of risk factor reduction measures to prevent injury from fall in the home  7/10/2024 2009 by Mariam Serna RN  Outcome: Progressing  7/10/2024 1626 by Mariam Serna RN  Outcome: Progressing  Goal: Use assistive devices by end of the shift  7/10/2024 2009 by Mariam Serna RN  Outcome: Progressing  7/10/2024 1626 by Mariam Serna RN  Outcome: Progressing  Goal: Pace activities to prevent fatigue by end of the shift  7/10/2024 2009 by Mariam Serna RN  Outcome: Progressing  7/10/2024 1626 by Mariam Serna RN  Outcome: Progressing     Problem: Safety - Adult  Goal: Free from fall injury  7/10/2024 2009 by Mariam Serna RN  Outcome: Progressing  7/10/2024 1626 by Mariam Serna RN  Outcome: Progressing     Problem: Discharge Planning  Goal: Discharge to home or other facility with appropriate resources  7/10/2024 2009 by Mariam Serna RN  Outcome: Progressing  7/10/2024 1626 by Mariam Serna RN  Outcome: Progressing

## 2024-07-11 NOTE — PROGRESS NOTES
Moon Martinez is a 74 y.o. female on day 0 of admission presenting with Stroke-like symptoms.      Subjective   Pt seen and examined.        Objective     Last Recorded Vitals  /71   Pulse 60   Temp 36 °C (96.8 °F) (Temporal)   Resp 18   Wt 63 kg (138 lb 14.2 oz)   SpO2 98%   Intake/Output last 3 Shifts:    Intake/Output Summary (Last 24 hours) at 7/11/2024 1223  Last data filed at 7/11/2024 1202  Gross per 24 hour   Intake 360 ml   Output 0 ml   Net 360 ml       Admission Weight  Weight: 59 kg (130 lb) (07/10/24 1054)    Daily Weight  07/10/24 : 63 kg (138 lb 14.2 oz)      Physical Exam  Constitutional:       General: She is not in acute distress.  HENT:      Head: Normocephalic.   Eyes:      Extraocular Movements: Extraocular movements intact.      Pupils: Pupils are equal, round, and reactive to light.   Cardiovascular:      Rate and Rhythm: Normal rate and regular rhythm.      Pulses: Normal pulses.      Heart sounds: Normal heart sounds.   Pulmonary:      Effort: Pulmonary effort is normal.      Breath sounds: Normal breath sounds.   Abdominal:      General: Bowel sounds are normal.      Palpations: Abdomen is soft.   Musculoskeletal:         General: Normal range of motion.      Cervical back: Normal range of motion and neck supple.   Skin:     Capillary Refill: Capillary refill takes less than 2 seconds.   Neurological:      Mental Status: She is alert and oriented to person, place, and time.      Sensory: Sensory deficit (left hand forearm and arm to shoulder) present.   Psychiatric:         Mood and Affect: Mood normal.   Relevant Results  Results for orders placed or performed during the hospital encounter of 07/10/24 (from the past 24 hour(s))   Hepatic Function Panel   Result Value Ref Range    Albumin 4.1 3.4 - 5.0 g/dL    Bilirubin, Total 0.4 0.0 - 1.2 mg/dL    Bilirubin, Direct 0.0 0.0 - 0.3 mg/dL    Alkaline Phosphatase 76 33 - 136 U/L    ALT 20 7 - 45 U/L    AST 20 9 - 39 U/L    Total  Protein 6.8 6.4 - 8.2 g/dL   Lavender Top   Result Value Ref Range    Extra Tube Hold for add-ons.    SST TOP   Result Value Ref Range    Extra Tube Hold for add-ons.    Urinalysis with Reflex Culture and Microscopic   Result Value Ref Range    Color, Urine Light-Yellow Light-Yellow, Yellow, Dark-Yellow    Appearance, Urine Clear Clear    Specific Gravity, Urine 1.022 1.005 - 1.035    pH, Urine 6.0 5.0, 5.5, 6.0, 6.5, 7.0, 7.5, 8.0    Protein, Urine NEGATIVE NEGATIVE, 10 (TRACE), 20 (TRACE) mg/dL    Glucose, Urine Normal Normal mg/dL    Blood, Urine NEGATIVE NEGATIVE    Ketones, Urine NEGATIVE NEGATIVE mg/dL    Bilirubin, Urine NEGATIVE NEGATIVE    Urobilinogen, Urine Normal Normal mg/dL    Nitrite, Urine NEGATIVE NEGATIVE    Leukocyte Esterase, Urine 250 Matilda/µL (A) NEGATIVE   Microscopic Only, Urine   Result Value Ref Range    WBC, Urine 1-5 1-5, NONE /HPF    RBC, Urine 1-2 NONE, 1-2, 3-5 /HPF    Squamous Epithelial Cells, Urine 1-9 (SPARSE) Reference range not established. /HPF    Bacteria, Urine 1+ (A) NONE SEEN /HPF    Mucus, Urine FEW Reference range not established. /LPF    Hyaline Casts, Urine OCCASIONAL (A) NONE /LPF   Drug Screen, Urine   Result Value Ref Range    Amphetamine Screen, Urine Presumptive Negative Presumptive Negative    Barbiturate Screen, Urine Presumptive Negative Presumptive Negative    Benzodiazepines Screen, Urine Presumptive Negative Presumptive Negative    Cannabinoid Screen, Urine Presumptive Negative Presumptive Negative    Cocaine Metabolite Screen, Urine Presumptive Negative Presumptive Negative    Fentanyl Screen, Urine Presumptive Negative Presumptive Negative    Opiate Screen, Urine Presumptive Negative Presumptive Negative    Oxycodone Screen, Urine Presumptive Negative Presumptive Negative    PCP Screen, Urine Presumptive Negative Presumptive Negative    Methadone Screen, Urine Presumptive Negative Presumptive Negative   Extra Urine Gray Tube   Result Value Ref Range    Extra  Tube Hold for add-ons.    SST TOP   Result Value Ref Range    Extra Tube Hold for add-ons.    CBC   Result Value Ref Range    WBC 5.0 4.4 - 11.3 x10*3/uL    nRBC 0.0 0.0 - 0.0 /100 WBCs    RBC 4.85 4.00 - 5.20 x10*6/uL    Hemoglobin 14.1 12.0 - 16.0 g/dL    Hematocrit 42.8 36.0 - 46.0 %    MCV 88 80 - 100 fL    MCH 29.1 26.0 - 34.0 pg    MCHC 32.9 32.0 - 36.0 g/dL    RDW 13.0 11.5 - 14.5 %    Platelets 291 150 - 450 x10*3/uL   Comprehensive metabolic panel   Result Value Ref Range    Glucose 82 74 - 99 mg/dL    Sodium 141 136 - 145 mmol/L    Potassium 3.9 3.5 - 5.3 mmol/L    Chloride 107 98 - 107 mmol/L    Bicarbonate 27 21 - 32 mmol/L    Anion Gap 11 10 - 20 mmol/L    Urea Nitrogen 20 6 - 23 mg/dL    Creatinine 0.90 0.50 - 1.05 mg/dL    eGFR 67 >60 mL/min/1.73m*2    Calcium 9.6 8.6 - 10.3 mg/dL    Albumin 4.6 3.4 - 5.0 g/dL    Alkaline Phosphatase 84 33 - 136 U/L    Total Protein 7.9 6.4 - 8.2 g/dL    AST 23 9 - 39 U/L    Bilirubin, Total 0.5 0.0 - 1.2 mg/dL    ALT 22 7 - 45 U/L   Transesophageal Echo ([facility]IE)   Result Value Ref Range    BSA 1.62 m2        Transesophageal Echo ([facility])         CT angio head w and wo IV contrast   Final Result   The CT angiogram through the upper thorax demonstrates no significant   stenosis along the origins of the right brachiocephalic artery, left   common carotid, or left subclavian artery. No significant stenosis   noted along the origins of the vertebral arteries.        The CT angiogram of the neck demonstrates mild partially calcified   atherosclerotic plaque along the distal left common carotid artery   without significant focal stenosis. No significant stenosis noted   along the left carotid bifurcation or cervical segment of the left   internal carotid artery. No significant stenosis noted along the   right common carotid artery, right carotid bifurcation, or cervical   segment of the right internal carotid artery. No significant stenosis   noted along the cervical segments of  the vertebral arteries. There is   a left dominant vertebral artery.        There is mild atherosclerotic calcification noted along the bilateral   carotid siphons without significant focal stenosis. No significant   stenosis noted along the distal vertebral arteries or basilar artery.   There is a fetal origin of the left posterior cerebral artery. The A1   segment of the right anterior cerebral artery is not well defined   which may be related to congenital hypoplasia or secondary narrowing.   The more distal right anterior cerebral artery is supplied from the   left via a patent anterior communicating artery. There is asymmetric   diminished caliber of M2 and more distal branch vessels of the right   middle cerebral artery when compared with the left.        The source CT images of the head demonstrate mild-to-moderate brain   parenchymal volume loss. There are nonspecific white matter changes   within the cerebral hemispheres bilaterally which while nonspecific,   given the patient's age, may represent sequelae of small-vessel   ischemic change. Given the above CT angiogram findings and clinical   history, further evaluation with a MRI of the brain with   diffusion-weighted imaging could be considered for possible   superimposed acute infarction as it is a more sensitive examination.        The source CT angiogram images of the neck demonstrate multilevel   cervical spondylosis. There is a nonspecific nodular hypodensity   noted within the inferior left lobe of the thyroid gland measuring   approximately 23 mm in greatest axial dimension.   Further evaluation   with nonemergent thyroid ultrasound.(managing Incidental Thyroid   Nodules Detected on Imaging: White Paper of the ACR Incidental   Thyroid Findings Committee. Ora Haines. et al. Journal of the   American College of Radiology,Volume 12, Issue 2, 143 - 150.)             MACRO:   Critical Finding:  See findings. Notification was initiated on   7/11/2024  at 8:50 am by  Rocky Herman.  (**-OCF-**)        Signed by: Rocky Herman 7/11/2024 8:51 AM   Dictation workstation:   [id number]      CT brain attack head wo IV contrast   Final Result   No acute intracranial bleed or focal mass effect.        Mild volume loss.        Mild chronic white matter ischemic disease in the deep   periventricular regions.        MACRO:   Shane Hyatt discussed the significance and urgency of this critical   finding epic secure chat with  ROCKY BOOTHE on 7/10/2024 at 11:25   am.  (**-RCF-**) Findings:  See findings.        Signed by: Shane Hyatt 7/10/2024 11:26 AM   Dictation workstation:   [id number]          Scheduled medications  apixaban, 5 mg, oral, q12h  aspirin, 81 mg, oral, Daily  atenolol, 100 mg, oral, Daily  atorvastatin, 80 mg, oral, Nightly  docusate sodium, 100 mg, oral, BID  [Held by provider] enoxaparin, 40 mg, subcutaneous, q24h  [START ON 7/14/2024] ergocalciferol, 1,250 mcg, oral, Every Sunday  melatonin, 3 mg, oral, Daily  pantoprazole, 40 mg, oral, Daily before breakfast   Or  pantoprazole, 40 mg, intravenous, Daily before breakfast      Continuous medications  oxygen, , Last Rate: 3 L/min (07/11/24 1143)      PRN medications  PRN medications: acetaminophen **OR** acetaminophen **OR** acetaminophen, acetaminophen **OR** acetaminophen **OR** acetaminophen, butamben-tetracaine-benzocaine, dextrose, dextrose, diclofenac sodium, fentaNYL PF, glucagon, glucagon, lidocaine, midazolam, ondansetron ODT **OR** ondansetron, oxygen         Assessment/Plan                  Principal Problem:    Stroke-like symptoms      #new strokelike symptoms with recent right MCA ischemic stroke   #Moderate sized PFO  -  Neurology consulted appreciate recommendations  -Cardiology consulted to give the recent finding of PFO on KACIE.  Would like them to weigh in on urgency of PFO repair  -  continue with permissive hypertension.  Will start correcting blood pressure  tomorrow.  -continue aspirin and statin  -switch plavix to eliquis  -KACIE today      #Hypertension  #Hyperlipidemia  -Will continue home meds.  Holding antihypertensives for now.              Caleb Mittal MD

## 2024-07-11 NOTE — PROGRESS NOTES
Occupational Therapy                   Therapy Communication Note    Patient Name: Moon Martinez  MRN: [id number]  Today's Date: 7/11/2024     Discipline: Occupational Therapy    Missed Visit Reason: Missed Visit Reason:  (Screen and Discharge)    Missed Time: Attempt    Comment: OT orders received, chart reviewed. Pt. Was evaluated last week during hospital stay and was independent/no needs at that time. Spoke with patient this date and observed pt. Up in room independently. Pt. States no concerns and no changes with her mobility. No skilled OT needs. Will screen and discharge orders; RN notified.

## 2024-07-11 NOTE — CONSULTS
Inpatient consult to Cardiology  Consult performed by: AYAAN Walsh-CNP  Consult ordered by: Yarely Dasilva MD  Reason for consult: pfo, tia                            Date:   7/11/2024  Patient name:  Moon Martinez  Date of admission:  7/10/2024 10:59 AM  MRN:   [id number]  YOB: 1950  Time of Consult:  8:17 AM    Consulting Cardiologist: AYAAN Shaw, CNP  Primary Cardiologist:  Dr. Smith Vasquez    Referring Provider: Dr. Mittal      Admission Diagnosis:     Stroke-like symptoms      History of Present Illness:      74-year-old female with past medical history of CVA with recent right MCA M1 segment occlusion, hypertension, hyperlipidemia, recently identified PFO on TTE who presented to [facility] emergency department with complaints of left face numbness, slurred speech, left arm numbness and tingling similar to when she presented on July 5 for CVA.  She denies any chest pain or shortness of breath.  Denies headache.  In the emergency room she was noted to have a blood pressure of 160s over 80.  Initial NIH was 1.  CT head showed mild prominence of ventricles and sulcal cocci with mild patchy hypodensity in the periventricular white matter no signs of intracranial bleed acutely, no midline shift or focal mass effect.  Lab work was unremarkable.  EKG showed sinus bradycardia with nonspecific ST wave abnormality.  No STEMI criteria.  The patient was just evaluated at [facility] emergency department just last week.  Her symptoms resolved.  She was placed on aspirin, Plavix and statins.  MRI of the brain showed no acute stroke.  Echocardiogram showed bubble study positive for the moderate-sized PFO.  Patient was to have a KACIE however was to be done outpatient.  She was discharged home in stable condition.      Allergies:     Allergies   Allergen Reactions    Hydralazine Anaphylaxis    Hydrochlorothiazide  Other    Lisinopril GI Upset and Other     Other reaction(s): GI Upset, GI Upset    Milk GI Upset     diarrhea and gas from milk    Morphine Hives         Past Medical History:     Past Medical History:   Diagnosis Date    Personal history of other diseases of the circulatory system     History of hypertension    Personal history of other endocrine, nutritional and metabolic disease     History of hypercholesterolemia       Past Surgical History:     Past Surgical History:   Procedure Laterality Date    LITHOTRIPSY  03/24/2015    Renal Lithotripsy    OTHER SURGICAL HISTORY  03/24/2015    Cystoscopy With Insertion Of Ureteral Stent    OTHER SURGICAL HISTORY  03/24/2015    Abdominal Surgery       Family History:     No family history on file.    Social History:     Social History     Tobacco Use    Smoking status: Never    Smokeless tobacco: Never       CURRENT INPATIENT MEDICATIONS    aspirin, 81 mg, oral, Daily  atenolol, 100 mg, oral, Daily  atorvastatin, 80 mg, oral, Nightly  clopidogrel, 75 mg, oral, Daily  docusate sodium, 100 mg, oral, BID  [Held by provider] enoxaparin, 40 mg, subcutaneous, q24h  [START ON 7/14/2024] ergocalciferol, 1,250 mcg, oral, Every Sunday  melatonin, 3 mg, oral, Daily  pantoprazole, 40 mg, oral, Daily before breakfast   Or  pantoprazole, 40 mg, intravenous, Daily before breakfast         Current Outpatient Medications   Medication Instructions    amLODIPine (NORVASC) 10 mg, oral, Daily    aspirin 81 mg, oral, Daily    atenolol (TENORMIN) 100 mg, oral, Daily    atorvastatin (LIPITOR) 40 mg, oral, Nightly    cholecalciferol (VITAMIN D-3) 50,000 Units, oral, Once Weekly, Sunday     clopidogrel (PLAVIX) 75 mg, oral, Daily    diclofenac sodium (VOLTAREN) 4 g, Topical, 4 times daily PRN    losartan-hydrochlorothiazide (Hyzaar) 100-25 mg tablet 1 tablet, oral, Daily        Review of Systems:      12 point review of systems was obtained in detail and is negative other than that detailed  above.    Vital Signs:     Vitals:    07/10/24 1937 07/10/24 2302 07/11/24 0400 07/11/24 0748   BP: 159/74 161/72 167/79 176/76   BP Location: Left arm Left arm Left arm Left arm   Patient Position: Sitting Sitting Sitting Lying   Pulse: 61 57 54 58   Resp: 19 19 18 18   Temp: 36.2 °C (97.2 °F) 36.4 °C (97.5 °F) 36.3 °C (97.3 °F) 36 °C (96.8 °F)   TempSrc: Temporal Temporal Temporal Temporal   SpO2: 96% 95% 98% 97%   Weight:       Height:         No intake or output data in the 24 hours ending 07/11/24 0817    Wt Readings from Last 4 Encounters:   07/10/24 63 kg (138 lb 14.2 oz)   07/05/24 58.1 kg (128 lb)       Physical Examination:     Physical Exam  Vitals and nursing note reviewed.   HENT:      Head: Normocephalic.      Mouth/Throat:      Mouth: Mucous membranes are moist.   Eyes:      Pupils: Pupils are equal, round, and reactive to light.   Cardiovascular:      Rate and Rhythm: Normal rate and regular rhythm.   Pulmonary:      Effort: Pulmonary effort is normal.   Abdominal:      General: Bowel sounds are normal.      Palpations: Abdomen is soft.   Musculoskeletal:         General: Normal range of motion.   Skin:     General: Skin is warm and dry.      Capillary Refill: Capillary refill takes less than 2 seconds.   Neurological:      Mental Status: She is alert and oriented to person, place, and time.   Psychiatric:         Mood and Affect: Mood normal.           Lab:     CBC:   Results from last 7 days   Lab Units 07/10/24  1128 07/06/24  0605 07/05/24  0752   WBC AUTO x10*3/uL 4.9 5.6 5.7   RBC AUTO x10*6/uL 4.38 4.71 4.49   HEMOGLOBIN g/dL 12.6 13.5 12.8   HEMATOCRIT % 38.8 41.4 39.4   MCV fL 89 88 88   MCH pg 28.8 28.7 28.5   MCHC g/dL 32.5 32.6 32.5   RDW % 13.0 13.2 13.2   PLATELETS AUTO x10*3/uL 252 282 268     CMP:    Results from last 7 days   Lab Units 07/10/24  1615 07/10/24  1128 07/06/24  0605 07/05/24  0752   SODIUM mmol/L  --  141 137 138   POTASSIUM mmol/L  --  4.0 4.4 4.5   CHLORIDE mmol/L  --   108* 104 104   CO2 mmol/L  --  24 26 26   BUN mg/dL  --  25* 27* 25*   CREATININE mg/dL  --  0.91 1.02 1.03   GLUCOSE mg/dL  --  95 94 100*   PROTEIN TOTAL g/dL 6.8 7.3  --  7.5   CALCIUM mg/dL  --  9.1 9.3 9.4   BILIRUBIN TOTAL mg/dL 0.4 0.4  --  0.4   ALK PHOS U/L 76 78  --  74   AST U/L 20 23  --  17   ALT U/L 20 21  --  14     BMP:    Results from last 7 days   Lab Units 07/10/24  1128 07/06/24  0605 07/05/24  0752   SODIUM mmol/L 141 137 138   POTASSIUM mmol/L 4.0 4.4 4.5   CHLORIDE mmol/L 108* 104 104   CO2 mmol/L 24 26 26   BUN mg/dL 25* 27* 25*   CREATININE mg/dL 0.91 1.02 1.03   CALCIUM mg/dL 9.1 9.3 9.4   GLUCOSE mg/dL 95 94 100*     Magnesium:  Results from last 7 days   Lab Units 07/05/24  0752   MAGNESIUM mg/dL 2.41*     Troponin:    Results from last 7 days   Lab Units 07/10/24  1128 07/05/24  0859 07/05/24  0752   TROPHS ng/L 7 5 4     BNP:   Results from last 7 days   Lab Units 07/10/24  1128 07/05/24  1501   BNP pg/mL 28 19     Lipid Panel:  Results from last 7 days   Lab Units 07/10/24  1128 07/05/24  1501   HDL mg/dL 47.2 42.1   CHOLESTEROL/HDL RATIO  2.6 3.8   VLDL mg/dL 12 32   TRIGLYCERIDES mg/dL 58 158*   NON HDL CHOL. mg/dL 74 117        Diagnostic Studies:   [place] [id number]   Tel [phone] Fax [phone]     TRANSTHORACIC ECHOCARDIOGRAM REPORT     Patient Name:      VICTOR M Casanova Physician:    [id number] Nathan Lee MD, [facility]  Study Date:        7/6/2024              Ordering Provider:    [id number] JOHN GONZALEZ  MRN/PID:           [id number]              Fellow:  Accession#:        [id number]          Nurse:  Date of Birth/Age: 1950 / 74 years   Sonographer:          Jenn Tinoco                                                                  [facility]  Gender:            F                     Additional Staff:  Height:            149.86 cm             Admit Date:           7/5/2024  Weight:            57.61 kg              Admission Status:     Inpatient -                                                                 Routine  BSA / BMI:         1.52 m2 / 25.65 kg/m2 Department Location:  [address] [place] [address]  Blood Pressure: 134 /71 mmHg     Study Type:    TRANSTHORACIC ECHO (TTE) COMPLETE  Diagnosis/ICD: Other transient cerebral ischemic attacks and related                 syndromes-G45.8  Indication:    Transischemic Attack  CPT Codes:     Echo Complete w Full [id number]     Patient History:  Pertinent History: TIA, HTN and Hyperlipidemia.     Study Detail: The following Echo studies were performed: M-Mode, 2D, Doppler and                color flow. Technically challenging study due to body habitus.                Agitated saline used as a contrast agent for intraseptal flow                evaluation and Definity used as a contrast agent for endocardial                border definition. Total contrast used for this procedure was 4 mL                via IV push.        PHYSICIAN INTERPRETATION:  Left Ventricle: The left ventricular systolic function is normal, with a visually estimated ejection fraction of 65-70%. There are no regional wall motion abnormalities. The left ventricular cavity size is normal. The left ventricular septal wall thickness is moderately increased. There is moderately increased left ventricular posterior wall thickness. Spectral Doppler shows an impaired relaxation pattern of left ventricular diastolic filling.  Left Atrium: The left atrium is normal in size. A bubble study using agitated saline was performed. Bubble study is positive. A moderate PFO (11-20 bubbles) was demonstrated. Left atrial volume index is 11.2 ml/m2.  Right Ventricle: The right ventricle is normal in size. There is mildly increased right ventriclar wall  thickness. There is normal right ventricular global systolic function. Normal right ventricular chamber size and function.  Right Atrium: The right atrium is normal in size.  Aortic Valve: The aortic valve is trileaflet. The aortic valve dimensionless index is 0.88. There is no evidence of aortic valve regurgitation. The peak instantaneous gradient of the aortic valve is 13.0 mmHg. The mean gradient of the aortic valve is 6.0 mmHg.  Mitral Valve: The mitral valve is normal in structure. There is mild mitral valve regurgitation. Mild (+) mitral regurgitation.  Tricuspid Valve: The tricuspid valve is structurally normal. No evidence of tricuspid regurgitation. Trivial tricuspid regurgitation with estimated RVSP 13 mmHg.  Pulmonic Valve: The pulmonic valve is structurally normal. There is no indication of pulmonic valve regurgitation.  Pericardium: There is no pericardial effusion noted.  Aorta: The aortic root is normal.  Systemic Veins: The inferior vena cava was not well visualized.        CONCLUSIONS:   1. The left ventricular systolic function is normal, with a visually estimated ejection fraction of 65-70%.   2. Spectral Doppler shows an impaired relaxation pattern of left ventricular diastolic filling.   3. Moderately increased left ventricular septal thickness.   4. The left ventricular posterior wall thickness is moderately increased.   5. There is normal right ventricular global systolic function.   6. Mild (+) mitral regurgitation.   7. Trivial tricuspid regurgitation with estimated RVSP 13 mmHg.   8. No previous available for comparison.   9. A bubble study using agitated saline was performed. Bubble study is positive. A moderate PFO (11-20 bubbles) was demonstrated.       ECG 12 lead    Result Date: 7/10/2024  Sinus bradycardia Possible Left atrial enlargement Left ventricular hypertrophy Septal infarct (cited on or before 05-JUL-2024) ST & Marked T wave abnormality, consider anterolateral ischemia  Abnormal ECG When compared with ECG of 05-JUL-2024 09:43, Serial changes of Septal infarct Present See ED provider note for full interpretation and clinical correlation    CT brain attack head wo IV contrast    Result Date: 7/10/2024  Interpreted By:  Shane Hyatt, STUDY: CT BRAIN ATTACK HEAD WO IV CONTRAST;  7/10/2024 11:15 am   INDICATION: Signs/Symptoms:Stroke Evaluation.   COMPARISON: Previous exam is from 07/05/2024..   ACCESSION NUMBER(S): [id number]   ORDERING CLINICIAN: PORSCHE BOOTHE   TECHNIQUE: Routine axial images were obtained from the skull base through the vertex.  Sagittal and coronal reconstruction images were generated. Brain, subdural, and bone windows were reviewed. N/A Unavailable   FINDINGS: INTRACRANIAL: Mild prominence of ventricles and sulci. There is mild patchy hypodensity throughout the deep periventricular white matter. No acute intracranial bleed, midline shift, or focal mass effect. No destructive bone lesion. No depressed skull fracture. Skullbase arterial calcifications in the carotid siphons and vertebral arteries.   EXTRACRANIAL: Visualized paranasal sinuses were clear. Visualized mastoid air cells were clear.       No acute intracranial bleed or focal mass effect.   Mild volume loss.   Mild chronic white matter ischemic disease in the deep periventricular regions.   MACRO: Shane Hyatt discussed the significance and urgency of this critical finding epic secure chat with  PORSCHE BOOTHE on 7/10/2024 at 11:25 am.  (**-RCF-**) Findings:  See findings.   Signed by: Shane Hyatt 7/10/2024 11:26 AM Dictation workstation:   [id number]        Radiology:     CT brain attack head wo IV contrast   Final Result   No acute intracranial bleed or focal mass effect.        Mild volume loss.        Mild chronic white matter ischemic disease in the deep   periventricular regions.        MACRO:   Shane Hyatt discussed the significance and urgency of this critical   finding epic secure chat  with  PORSCHE BOOTHE on 7/10/2024 at 11:25   am.  (**-RCF-**) Findings:  See findings.        Signed by: Shane Hyatt 7/10/2024 11:26 AM   Dictation workstation:   [id number]      Transesophageal Echo (KACIE)    (Results Pending)   CT angio head w and wo IV contrast    (Results Pending)       Problem List:     Patient Active Problem List   Diagnosis    TIA (transient ischemic attack)    Nonintractable headache, unspecified chronicity pattern, unspecified headache type    Stroke-like symptoms       Assessment:   TIA  Positive PFO identified on TTE  Hypertension  Hyperlipidemia      Plan:   Admit to medicine.  Continue telemetry monitoring.  Telemetry shows normal sinus rhythm no ectopy.  Reviewed EKGs.  Nonspecific ST wave abnormalities.  No STEMI criteria.  Monitor electrolytes, keep potassium greater than 4 and magnesium greater than 2  Supplemental O2  N.p.o.  Discussed at length in regards to patient needing KACIE.  Patient is agreeable to proceed with KACIE today with Dr. Vasquez.  Allow for permissive hypertension with goal systolic blood pressure of 140-160  Appreciate neuro recommendations  Continue aspirin and Plavix along with high intensity statin  PT/OT  Further recommendations post KACIE    Chago Gastelum [facility]  Adult Gerontology Acute Care Nurse Practitioner  [facility] [place]   [facility]  [phone]      Of note, this documentation is completed using the Dragon Dictation system (voice recognition software). There may be spelling and/or grammatical errors that were not corrected prior to final submission.      Electronically signed by MILADIS Walsh, on 7/11/2024 at 8:17 AM     Patient seen and examined in conjunction with AYAAN Cisneros/CNP and agree with the evaluation as noted above. 74-year-old lady with bilateral recently admitted with strokelike symptoms and had an echocardiogram which showed positive bubble studies.  She  was previously scheduled for a KACIE but left the hospital now presents again with recurrent TIA symptoms.  She denies any chest pain or significant shortness of breath with her day-to-day activities.  She also denied orthopnea or paroxysmal nocturnal dyspnea.  Her EKG shows normal sinus rhythm with relative bradycardia and nonspecific ST-T wave changes.  Repeat MRI of the brain did not show any acute stroke.    Agree with examination as noted above.  Cardiac exam reveals regular first and second heart sound, no S3 or S4 heard.  There were no murmurs.  Chest is clear to auscultation bilaterally.      Assessment and plan:  1.  Recurrent TIA: With abnormal bubble study and transthoracic as noted above.  The patient subsequently had a KACIE which confirmed moderate-sized PFO with Predominantly right to left shunt.  This was reviewed by Dr. Mejía and a review of the patient's symptoms, she will be scheduled for elective PFO closure.  In the meantime, we will continue with current dual antiplatelet therapy.  2.  Hypertension, will continue to optimize blood pressure control.  3.  Dyslipidemia: Continue with high intensity lipid-lowering therapy.  AKA

## 2024-07-12 VITALS
WEIGHT: 138.89 LBS | TEMPERATURE: 97.3 F | OXYGEN SATURATION: 96 % | HEIGHT: 59 IN | SYSTOLIC BLOOD PRESSURE: 141 MMHG | DIASTOLIC BLOOD PRESSURE: 65 MMHG | RESPIRATION RATE: 18 BRPM | HEART RATE: 57 BPM | BODY MASS INDEX: 28 KG/M2

## 2024-07-12 LAB
ALBUMIN SERPL BCP-MCNC: 3.9 G/DL (ref 3.4–5)
ALP SERPL-CCNC: 69 U/L (ref 33–136)
ALT SERPL W P-5'-P-CCNC: 17 U/L (ref 7–45)
ANION GAP SERPL CALC-SCNC: 11 MMOL/L (ref 10–20)
AST SERPL W P-5'-P-CCNC: 17 U/L (ref 9–39)
BILIRUB SERPL-MCNC: 0.4 MG/DL (ref 0–1.2)
BUN SERPL-MCNC: 21 MG/DL (ref 6–23)
CALCIUM SERPL-MCNC: 8.7 MG/DL (ref 8.6–10.3)
CHLORIDE SERPL-SCNC: 107 MMOL/L (ref 98–107)
CO2 SERPL-SCNC: 25 MMOL/L (ref 21–32)
CREAT SERPL-MCNC: 0.88 MG/DL (ref 0.5–1.05)
EGFRCR SERPLBLD CKD-EPI 2021: 69 ML/MIN/1.73M*2
ERYTHROCYTE [DISTWIDTH] IN BLOOD BY AUTOMATED COUNT: 13.2 % (ref 11.5–14.5)
GLUCOSE BLD MANUAL STRIP-MCNC: 125 MG/DL (ref 74–99)
GLUCOSE BLD MANUAL STRIP-MCNC: 88 MG/DL (ref 74–99)
GLUCOSE SERPL-MCNC: 84 MG/DL (ref 74–99)
HCT VFR BLD AUTO: 36.1 % (ref 36–46)
HGB BLD-MCNC: 12.1 G/DL (ref 12–16)
HOLD SPECIMEN: NORMAL
HOLD SPECIMEN: NORMAL
MCH RBC QN AUTO: 29.1 PG (ref 26–34)
MCHC RBC AUTO-ENTMCNC: 33.5 G/DL (ref 32–36)
MCV RBC AUTO: 87 FL (ref 80–100)
NRBC BLD-RTO: 0 /100 WBCS (ref 0–0)
PLATELET # BLD AUTO: 239 X10*3/UL (ref 150–450)
POTASSIUM SERPL-SCNC: 3.9 MMOL/L (ref 3.5–5.3)
PROT SERPL-MCNC: 6.6 G/DL (ref 6.4–8.2)
RBC # BLD AUTO: 4.16 X10*6/UL (ref 4–5.2)
SODIUM SERPL-SCNC: 139 MMOL/L (ref 136–145)
WBC # BLD AUTO: 5.8 X10*3/UL (ref 4.4–11.3)

## 2024-07-12 PROCEDURE — 99239 HOSP IP/OBS DSCHRG MGMT >30: CPT | Performed by: INTERNAL MEDICINE

## 2024-07-12 PROCEDURE — 82947 ASSAY GLUCOSE BLOOD QUANT: CPT

## 2024-07-12 PROCEDURE — 2500000001 HC RX 250 WO HCPCS SELF ADMINISTERED DRUGS (ALT 637 FOR MEDICARE OP)

## 2024-07-12 PROCEDURE — 99232 SBSQ HOSP IP/OBS MODERATE 35: CPT | Performed by: NURSE PRACTITIONER

## 2024-07-12 PROCEDURE — 85027 COMPLETE CBC AUTOMATED: CPT | Performed by: STUDENT IN AN ORGANIZED HEALTH CARE EDUCATION/TRAINING PROGRAM

## 2024-07-12 PROCEDURE — 2500000001 HC RX 250 WO HCPCS SELF ADMINISTERED DRUGS (ALT 637 FOR MEDICARE OP): Performed by: INTERNAL MEDICINE

## 2024-07-12 PROCEDURE — 92610 EVALUATE SWALLOWING FUNCTION: CPT | Mod: GN

## 2024-07-12 PROCEDURE — 2500000001 HC RX 250 WO HCPCS SELF ADMINISTERED DRUGS (ALT 637 FOR MEDICARE OP): Performed by: STUDENT IN AN ORGANIZED HEALTH CARE EDUCATION/TRAINING PROGRAM

## 2024-07-12 PROCEDURE — 80053 COMPREHEN METABOLIC PANEL: CPT | Performed by: STUDENT IN AN ORGANIZED HEALTH CARE EDUCATION/TRAINING PROGRAM

## 2024-07-12 PROCEDURE — 36415 COLL VENOUS BLD VENIPUNCTURE: CPT | Performed by: STUDENT IN AN ORGANIZED HEALTH CARE EDUCATION/TRAINING PROGRAM

## 2024-07-12 PROCEDURE — 99232 SBSQ HOSP IP/OBS MODERATE 35: CPT

## 2024-07-12 RX ORDER — PANTOPRAZOLE SODIUM 40 MG/1
40 TABLET, DELAYED RELEASE ORAL
Qty: 30 TABLET | Refills: 0 | Status: SHIPPED | OUTPATIENT
Start: 2024-07-13 | End: 2024-08-12

## 2024-07-12 RX ORDER — AMLODIPINE BESYLATE 5 MG/1
10 TABLET ORAL DAILY
Status: DISCONTINUED | OUTPATIENT
Start: 2024-07-12 | End: 2024-07-12 | Stop reason: HOSPADM

## 2024-07-12 RX ORDER — ATORVASTATIN CALCIUM 80 MG/1
80 TABLET, FILM COATED ORAL NIGHTLY
Qty: 30 TABLET | Refills: 0 | Status: SHIPPED | OUTPATIENT
Start: 2024-07-12 | End: 2024-08-11

## 2024-07-12 ASSESSMENT — PAIN SCALES - GENERAL: PAINLEVEL_OUTOF10: 0 - NO PAIN

## 2024-07-12 ASSESSMENT — PAIN - FUNCTIONAL ASSESSMENT: PAIN_FUNCTIONAL_ASSESSMENT: 0-10

## 2024-07-12 ASSESSMENT — VISUAL ACUITY: METHOD_CF: 1

## 2024-07-12 NOTE — PROGRESS NOTES
Rounding PILO/Cardiologist:  Chago Gastelum, APRN-CNP,   Primary Cardiologist: Dr. Smith Vasquez    Date:  7/12/2024  Patient:  Moon Martinez  YOB: 1950  MRN:  [id number]   Admit Date:  7/10/2024      SUBJECTIVE:    Moon Martinez was seen and examined today at bedside.     She denies any chest pain or shortness of breath.     KACIE showed  Normal ventilatory function with EF of 55 to 60%.  There is moderate concentric left ventricular hypertrophy.  Mild left atrial enlargement.  Trileaflet aortic valve with no stenosis or regurgitation.  Mildly thickened mitral valve with trace to mild mitral regurgitation  Structurally normal tricuspid valve, with trace to mild tricuspid regurgitation.  Positive bubble study with right-to-left shunt and bubbles are greater than 10, consistent with moderate PFO.  Mild plaque in the descending thoracic aorta        VITALS:     Vitals:    07/12/24 0000 07/12/24 0400 07/12/24 0752 07/12/24 1002   BP: 157/71 168/85 168/79    BP Location:   Left arm    Patient Position:   Lying    Pulse: 56 58 54 70   Resp: 16 16 18    Temp: 36.1 °C (97 °F) 36.5 °C (97.7 °F) 36.2 °C (97.2 °F)    TempSrc: Temporal Temporal Temporal    SpO2: 97% 97% 94%    Weight:       Height:           Intake/Output Summary (Last 24 hours) at 7/12/2024 1124  Last data filed at 7/12/2024 0800  Gross per 24 hour   Intake 1330 ml   Output 0 ml   Net 1330 ml       Wt Readings from Last 4 Encounters:   07/10/24 63 kg (138 lb 14.2 oz)   07/05/24 58.1 kg (128 lb)       CURRENT HOSPITAL MEDICATIONS:   amLODIPine, 10 mg, oral, Daily  apixaban, 5 mg, oral, q12h  aspirin, 81 mg, oral, Daily  atenolol, 100 mg, oral, Daily  atorvastatin, 80 mg, oral, Nightly  docusate sodium, 100 mg, oral, BID  [START ON 7/14/2024] ergocalciferol, 1,250 mcg, oral, Every Sunday  losartan 100 mg, hydroCHLOROthiazide 25 mg for Hyzaar 100/25, , oral, Daily  melatonin, 3 mg, oral, Daily  pantoprazole, 40 mg, oral, Daily  before breakfast      oxygen, , Last Rate: 3 L/min (07/11/24 1143)      Current Outpatient Medications   Medication Instructions    amLODIPine (NORVASC) 10 mg, oral, Daily    aspirin 81 mg, oral, Daily    atenolol (TENORMIN) 100 mg, oral, Daily    atorvastatin (LIPITOR) 40 mg, oral, Nightly    cholecalciferol (VITAMIN D-3) 50,000 Units, oral, Once Weekly, Sunday     clopidogrel (PLAVIX) 75 mg, oral, Daily    diclofenac sodium (VOLTAREN) 4 g, Topical, 4 times daily PRN    losartan-hydrochlorothiazide (Hyzaar) 100-25 mg tablet 1 tablet, oral, Daily        PHYSICAL EXAMINATION:     Physical Exam  HENT:      Head: Normocephalic.      Mouth/Throat:      Mouth: Mucous membranes are moist.   Eyes:      Pupils: Pupils are equal, round, and reactive to light.   Cardiovascular:      Rate and Rhythm: Normal rate and regular rhythm.   Pulmonary:      Effort: Pulmonary effort is normal.   Abdominal:      General: Bowel sounds are normal.      Palpations: Abdomen is soft.   Musculoskeletal:         General: Normal range of motion.   Skin:     General: Skin is warm and dry.      Capillary Refill: Capillary refill takes less than 2 seconds.   Neurological:      Mental Status: She is alert and oriented to person, place, and time.   Psychiatric:         Mood and Affect: Mood normal.         LAB DATA:     CBC:   Results from last 7 days   Lab Units 07/12/24  0644 07/11/24  0828 07/10/24  1128   WBC AUTO x10*3/uL 5.8 5.0 4.9   RBC AUTO x10*6/uL 4.16 4.85 4.38   HEMOGLOBIN g/dL 12.1 14.1 12.6   HEMATOCRIT % 36.1 42.8 38.8   MCV fL 87 88 89   MCH pg 29.1 29.1 28.8   MCHC g/dL 33.5 32.9 32.5   RDW % 13.2 13.0 13.0   PLATELETS AUTO x10*3/uL 239 291 252     CMP:    Results from last 7 days   Lab Units 07/12/24  0644 07/11/24  0828 07/10/24  1615 07/10/24  1128   SODIUM mmol/L 139 141  --  141   POTASSIUM mmol/L 3.9 3.9  --  4.0   CHLORIDE mmol/L 107 107  --  108*   CO2 mmol/L 25 27  --  24   BUN mg/dL 21 20  --  25*   CREATININE mg/dL 0.88  0.90  --  0.91   GLUCOSE mg/dL 84 82  --  95   PROTEIN TOTAL g/dL 6.6 7.9 6.8 7.3   CALCIUM mg/dL 8.7 9.6  --  9.1   BILIRUBIN TOTAL mg/dL 0.4 0.5 0.4 0.4   ALK PHOS U/L 69 84 76 78   AST U/L 17 23 20 23   ALT U/L 17 22 20 21     BMP:    Results from last 7 days   Lab Units 07/12/24  0644 07/11/24  0828 07/10/24  1128   SODIUM mmol/L 139 141 141   POTASSIUM mmol/L 3.9 3.9 4.0   CHLORIDE mmol/L 107 107 108*   CO2 mmol/L 25 27 24   BUN mg/dL 21 20 25*   CREATININE mg/dL 0.88 0.90 0.91   CALCIUM mg/dL 8.7 9.6 9.1   GLUCOSE mg/dL 84 82 95     Magnesium:    Troponin:    Results from last 7 days   Lab Units 07/10/24  1128   TROPHS ng/L 7     BNP:   Results from last 7 days   Lab Units 07/10/24  1128 07/05/24  1501   BNP pg/mL 28 19     Lipid Panel:  Results from last 7 days   Lab Units 07/10/24  1128 07/05/24  1501   HDL mg/dL 47.2 42.1   CHOLESTEROL/HDL RATIO  2.6 3.8   VLDL mg/dL 12 32   TRIGLYCERIDES mg/dL 58 158*   NON HDL CHOL. mg/dL 74 117        DIAGNOSTIC TESTING:       CT angio head w and wo IV contrast    Result Date: 7/11/2024  Interpreted By:  Rocky Herman, STUDY: CT ANGIO HEAD W AND WO IV CONTRAST; ;  7/10/2024 6:58 pm   INDICATION: Signs/Symptoms:recent stroke coming in with new symptoms.   COMPARISON: None.   ACCESSION NUMBER(S): [id number]   ORDERING CLINICIAN: DEMETRIUS CABALLERO   TECHNIQUE: Thin cut axial CT images were generated over the upper thorax, neck, and head during the arterial passage of a full contrast bolus.  The bolus was generated with a power injector and followed with immediate saline flush. These image data were subtracted and then used for 3-D reconstructions. Maximum intensity projections and shaded surface displays were generated in multiple planes. In addition images were transferred into a 3-D processing program and additional projections and displays were reviewed by the interpreting physician.   The post processed 3D images from the imaged lab are not available at the time of  dictation despite phone call attempts by radiology operations and myself to have the post processed.   FINDINGS: The CT angiogram through the upper thorax demonstrates no significant stenosis along the origins of the right brachiocephalic artery, left common carotid, or left subclavian artery. No significant stenosis noted along the origins of the vertebral arteries.   The CT angiogram of the neck demonstrates mild partially calcified atherosclerotic plaque along the distal left common carotid artery without significant focal stenosis. No significant stenosis noted along the left carotid bifurcation or cervical segment of the left internal carotid artery. No significant stenosis noted along the right common carotid artery, right carotid bifurcation, or cervical segment of the right internal carotid artery. No significant stenosis noted along the cervical segments of the vertebral arteries. There is a left dominant vertebral artery.   There is mild atherosclerotic calcification noted along the bilateral carotid siphons without significant focal stenosis.  No significant stenosis noted along the distal vertebral arteries or basilar artery. There is a fetal origin of the left posterior cerebral artery. The A1 segment of the right anterior cerebral artery is not well defined which may be related to congenital hypoplasia or secondary narrowing. The more distal right anterior cerebral artery is supplied from the left via a patent anterior communicating artery. There is asymmetric diminished caliber of M2 and more distal branch vessels of the right middle cerebral artery when compared with the left.   The source CT images of the head demonstrate mild-to-moderate brain parenchymal volume loss. There are nonspecific white matter changes within the cerebral hemispheres bilaterally which while nonspecific, given the patient's age, may represent sequelae of small-vessel ischemic change. There is no midline shift. There is a  retention cyst or polyp within the left maxillary sinus.   The source CT angiogram images of the neck demonstrate multilevel cervical spondylosis. There is a nonspecific nodular hypodensity noted within the inferior left lobe of the thyroid gland measuring approximately 23 mm in greatest axial dimension.   Further evaluation with nonemergent thyroid ultrasound.(managing Incidental Thyroid Nodules Detected on Imaging: White Paper of the ACR Incidental Thyroid Findings Committee. Ora Haines. et al. Journal of the American College of Radiology,Volume 12, Issue 2, 143 - 150.)       The CT angiogram through the upper thorax demonstrates no significant stenosis along the origins of the right brachiocephalic artery, left common carotid, or left subclavian artery. No significant stenosis noted along the origins of the vertebral arteries.   The CT angiogram of the neck demonstrates mild partially calcified atherosclerotic plaque along the distal left common carotid artery without significant focal stenosis. No significant stenosis noted along the left carotid bifurcation or cervical segment of the left internal carotid artery. No significant stenosis noted along the right common carotid artery, right carotid bifurcation, or cervical segment of the right internal carotid artery. No significant stenosis noted along the cervical segments of the vertebral arteries. There is a left dominant vertebral artery.   There is mild atherosclerotic calcification noted along the bilateral carotid siphons without significant focal stenosis. No significant stenosis noted along the distal vertebral arteries or basilar artery. There is a fetal origin of the left posterior cerebral artery. The A1 segment of the right anterior cerebral artery is not well defined which may be related to congenital hypoplasia or secondary narrowing. The more distal right anterior cerebral artery is supplied from the left via a patent anterior communicating  artery. There is asymmetric diminished caliber of M2 and more distal branch vessels of the right middle cerebral artery when compared with the left.   The source CT images of the head demonstrate mild-to-moderate brain parenchymal volume loss. There are nonspecific white matter changes within the cerebral hemispheres bilaterally which while nonspecific, given the patient's age, may represent sequelae of small-vessel ischemic change. Given the above CT angiogram findings and clinical history, further evaluation with a MRI of the brain with diffusion-weighted imaging could be considered for possible superimposed acute infarction as it is a more sensitive examination.   The source CT angiogram images of the neck demonstrate multilevel cervical spondylosis. There is a nonspecific nodular hypodensity noted within the inferior left lobe of the thyroid gland measuring approximately 23 mm in greatest axial dimension.   Further evaluation with nonemergent thyroid ultrasound.(managing Incidental Thyroid Nodules Detected on Imaging: White Paper of the ACR Incidental Thyroid Findings Committee. Ora Haines et al. Journal of the American College of Radiology,Volume 12, Issue 2, 143 - 150.)     MACRO: Critical Finding:  See findings. Notification was initiated on 7/11/2024 at 8:50 am by  Rocky Herman.  (**-OCF-**)   Signed by: Rocky Herman 7/11/2024 8:51 AM Dictation workstation:   [id number]    ECG 12 lead    Result Date: 7/10/2024  Sinus bradycardia Possible Left atrial enlargement Left ventricular hypertrophy Septal infarct (cited on or before 05-JUL-2024) ST & Marked T wave abnormality, consider anterolateral ischemia Abnormal ECG When compared with ECG of 05-JUL-2024 09:43, Serial changes of Septal infarct Present See ED provider note for full interpretation and clinical correlation    CT brain attack head wo IV contrast    Result Date: 7/10/2024  Interpreted By:  Shane Hyatt, STUDY: CT BRAIN ATTACK HEAD WO IV  CONTRAST;  7/10/2024 11:15 am   INDICATION: Signs/Symptoms:Stroke Evaluation.   COMPARISON: Previous exam is from 07/05/2024..   ACCESSION NUMBER(S): [id number]   ORDERING CLINICIAN: PORSCHE BOOTHE   TECHNIQUE: Routine axial images were obtained from the skull base through the vertex.  Sagittal and coronal reconstruction images were generated. Brain, subdural, and bone windows were reviewed. N/A Unavailable   FINDINGS: INTRACRANIAL: Mild prominence of ventricles and sulci. There is mild patchy hypodensity throughout the deep periventricular white matter. No acute intracranial bleed, midline shift, or focal mass effect. No destructive bone lesion. No depressed skull fracture. Skullbase arterial calcifications in the carotid siphons and vertebral arteries.   EXTRACRANIAL: Visualized paranasal sinuses were clear. Visualized mastoid air cells were clear.       No acute intracranial bleed or focal mass effect.   Mild volume loss.   Mild chronic white matter ischemic disease in the deep periventricular regions.   MACRO: Shane Hyatt discussed the significance and urgency of this critical finding epic secure chat with  PORSCHE BOOTHE on 7/10/2024 at 11:25 am.  (**-RCF-**) Findings:  See findings.   Signed by: Shane Hyatt 7/10/2024 11:26 AM Dictation workstation:   [id number]       Transesophageal Echo (KACIE)         CT angio head w and wo IV contrast   Final Result   The CT angiogram through the upper thorax demonstrates no significant   stenosis along the origins of the right brachiocephalic artery, left   common carotid, or left subclavian artery. No significant stenosis   noted along the origins of the vertebral arteries.        The CT angiogram of the neck demonstrates mild partially calcified   atherosclerotic plaque along the distal left common carotid artery   without significant focal stenosis. No significant stenosis noted   along the left carotid bifurcation or cervical segment of the left   internal  carotid artery. No significant stenosis noted along the   right common carotid artery, right carotid bifurcation, or cervical   segment of the right internal carotid artery. No significant stenosis   noted along the cervical segments of the vertebral arteries. There is   a left dominant vertebral artery.        There is mild atherosclerotic calcification noted along the bilateral   carotid siphons without significant focal stenosis. No significant   stenosis noted along the distal vertebral arteries or basilar artery.   There is a fetal origin of the left posterior cerebral artery. The A1   segment of the right anterior cerebral artery is not well defined   which may be related to congenital hypoplasia or secondary narrowing.   The more distal right anterior cerebral artery is supplied from the   left via a patent anterior communicating artery. There is asymmetric   diminished caliber of M2 and more distal branch vessels of the right   middle cerebral artery when compared with the left.        The source CT images of the head demonstrate mild-to-moderate brain   parenchymal volume loss. There are nonspecific white matter changes   within the cerebral hemispheres bilaterally which while nonspecific,   given the patient's age, may represent sequelae of small-vessel   ischemic change. Given the above CT angiogram findings and clinical   history, further evaluation with a MRI of the brain with   diffusion-weighted imaging could be considered for possible   superimposed acute infarction as it is a more sensitive examination.        The source CT angiogram images of the neck demonstrate multilevel   cervical spondylosis. There is a nonspecific nodular hypodensity   noted within the inferior left lobe of the thyroid gland measuring   approximately 23 mm in greatest axial dimension.   Further evaluation   with nonemergent thyroid ultrasound.(managing Incidental Thyroid   Nodules Detected on Imaging: White Paper of the ACR  Incidental   Thyroid Findings Committee. Ora Haines et al. Journal of the   American College of Radiology,Volume 12, Issue 2, 143  150.)             MACRO:   Critical Finding:  See findings. Notification was initiated on   7/11/2024 at 8:50 am by  Rocky Herman.  (**-OCF-**)        Signed by: Rocky Herman 7/11/2024 8:51 AM   Dictation workstation:   [id number]      CT brain attack head wo IV contrast   Final Result   No acute intracranial bleed or focal mass effect.        Mild volume loss.        Mild chronic white matter ischemic disease in the deep   periventricular regions.        MACRO:   Shane Hyatt discussed the significance and urgency of this critical   finding epic secure chat with  ROCKY BOOTHE on 7/10/2024 at 11:25   am.  (**-RCF-**) Findings:  See findings.        Signed by: Shane Hyatt 7/10/2024 11:26 AM   Dictation workstation:   [id number]            RADIOLOGY:     Transesophageal Echo (KACIE)         CT angio head w and wo IV contrast   Final Result   The CT angiogram through the upper thorax demonstrates no significant   stenosis along the origins of the right brachiocephalic artery, left   common carotid, or left subclavian artery. No significant stenosis   noted along the origins of the vertebral arteries.        The CT angiogram of the neck demonstrates mild partially calcified   atherosclerotic plaque along the distal left common carotid artery   without significant focal stenosis. No significant stenosis noted   along the left carotid bifurcation or cervical segment of the left   internal carotid artery. No significant stenosis noted along the   right common carotid artery, right carotid bifurcation, or cervical   segment of the right internal carotid artery. No significant stenosis   noted along the cervical segments of the vertebral arteries. There is   a left dominant vertebral artery.        There is mild atherosclerotic calcification noted along the bilateral   carotid  siphons without significant focal stenosis. No significant   stenosis noted along the distal vertebral arteries or basilar artery.   There is a fetal origin of the left posterior cerebral artery. The A1   segment of the right anterior cerebral artery is not well defined   which may be related to congenital hypoplasia or secondary narrowing.   The more distal right anterior cerebral artery is supplied from the   left via a patent anterior communicating artery. There is asymmetric   diminished caliber of M2 and more distal branch vessels of the right   middle cerebral artery when compared with the left.        The source CT images of the head demonstrate mild-to-moderate brain   parenchymal volume loss. There are nonspecific white matter changes   within the cerebral hemispheres bilaterally which while nonspecific,   given the patient's age, may represent sequelae of small-vessel   ischemic change. Given the above CT angiogram findings and clinical   history, further evaluation with a MRI of the brain with   diffusion-weighted imaging could be considered for possible   superimposed acute infarction as it is a more sensitive examination.        The source CT angiogram images of the neck demonstrate multilevel   cervical spondylosis. There is a nonspecific nodular hypodensity   noted within the inferior left lobe of the thyroid gland measuring   approximately 23 mm in greatest axial dimension.   Further evaluation   with nonemergent thyroid ultrasound.(managing Incidental Thyroid   Nodules Detected on Imaging: White Paper of the ACR Incidental   Thyroid Findings Committee. Ora Haines. et al. Journal of the   American College of Radiology,Volume 12, Issue 2, 143 - 150.)             MACRO:   Critical Finding:  See findings. Notification was initiated on   7/11/2024 at 8:50 am by  Rocky Herman.  (**-OCF-**)        Signed by: Rocky Herman 7/11/2024 8:51 AM   Dictation workstation:   [id number]      CT brain attack  head wo IV contrast   Final Result   No acute intracranial bleed or focal mass effect.        Mild volume loss.        Mild chronic white matter ischemic disease in the deep   periventricular regions.        MACRO:   Shane Hyatt discussed the significance and urgency of this critical   finding epic secure chat with  PORSCHE BOOTHE on 7/10/2024 at 11:25   am.  (**-RCF-**) Findings:  See findings.        Signed by: Shane Hyatt 7/10/2024 11:26 AM   Dictation workstation:   [id number]          PROBLEM LIST     Patient Active Problem List   Diagnosis    TIA (transient ischemic attack)    Nonintractable headache, unspecified chronicity pattern, unspecified headache type    Stroke-like symptoms       ASSESSMENT:   TIA  Positive PFO identified on KACIE  Hypertension  Hyperlipidemia        PLAN:   Admit to medicine.  Continue telemetry monitoring.  Telemetry shows normal sinus rhythm no ectopy.  Reviewed EKGs.  Nonspecific ST wave abnormalities.  No STEMI criteria.  Monitor electrolytes, keep potassium greater than 4 and magnesium greater than 2  Supplemental O2  KACIE showed, Normal ventilatory function with EF of 55 to 60%.  There is moderate concentric left ventricular hypertrophy, Mild left atrial enlargement, Trileaflet aortic valve with no stenosis or regurgitation, Mildly thickened mitral valve with trace to mild mitral regurgitation  Structurally normal tricuspid valve, with trace to mild tricuspid regurgitation.  Positive bubble study with right-to-left shunt and bubbles are greater than 10, consistent with moderate PFO, Mild plaque in the descending thoracic aorta  Spoke with neuro who recommends full anticoagulation with Eliquis and continue aspirin.  Will DC Plavix.  Continue statin.  PT/OT  Spoke with Dr. Mejía who is in agreement to close PFO.  Follow-up appointment made with Dr. Mejía for scheduling of PFO closure.  Continue current cardiac medication regimen  General cardiology to sign off, please  reconsult for any further needs        Chago Gastelum AGACNP-BC  Adult Gerontology Acute Care Nurse Practitioner  [facility]  [phone]          Of note, this documentation is completed using the Dragon Dictation system (voice recognition software). There may be spelling and/or grammatical errors that were not corrected prior to final submission.    Please do not hesitate to call with questions.  Electronically signed by Chago Gastelum, AYAAN-CNP, on 7/12/2024 at 11:24 AM

## 2024-07-12 NOTE — PROGRESS NOTES
"Moon Martinez is a 74 y.o. female on day 1 of admission presenting with Stroke-like symptoms.      Subjective   Pt. In bed, family at bedside. Plan is for oupt. PFO closure. Continues with Eliquis and aspirin. Continues with statin.   Left shoulder pain, xray shows mild osteoarthritis. Will update POA Teniesha on plan.  Review of systems are negative unless otherwise specified in HPI.        Objective     Last Recorded Vitals  Blood pressure 141/65, pulse 57, temperature 36.3 °C (97.3 °F), temperature source Temporal, resp. rate 18, height 1.499 m (4' 11\"), weight 63 kg (138 lb 14.2 oz), SpO2 96%.    Physical Exam  HENT:      Right Ear: Hearing normal.      Left Ear: Hearing normal.   Eyes:      Extraocular Movements: EOM normal. No nystagmus.   Neurological:      Motor: Motor strength is normal.     Deep Tendon Reflexes: Reflexes are normal and symmetric.   Psychiatric:         Speech: Speech normal.       Neurological Exam  Mental Status  Awake, alert and oriented to person, place and time. Recent and remote memory are intact. Speech is normal. Language is fluent with no aphasia. Attention and concentration are normal.    Cranial Nerves  CN II: Right visual acuity: Counts fingers. Left visual acuity: Counts fingers. Right normal visual field. Left normal visual field.  CN III, IV, VI: Extraocular movements intact bilaterally. No nystagmus.   Right pupil: 3 mm. Round. Reactive to light. Reactive to accommodation.   Left pupil: 3 mm. Round. Reactive to light. Reactive to accommodation.  CN V:  Right: Facial sensation is normal.  Left: Facial sensation is normal on the left.  CN VII:  Right: There is no facial weakness.  Left: There is no facial weakness.  CN VIII:  Right: Hearing is normal.  Left: Hearing is normal.  CN IX, X:  Right: Palate is normal.  Left: Palate is normal.  CN XI:  Right: Trapezius strength is normal.  Left: Trapezius strength is normal.  CN XII: Tongue midline without atrophy or " fasciculations.    Motor  Normal muscle bulk throughout. Normal muscle tone. Strength is 5/5 throughout all four extremities.    Sensory  Light touch is normal in upper and lower extremities.     Reflexes  Deep tendon reflexes are 2+ and symmetric in all four extremities.    Coordination  Right: Finger-to-nose normal.Left: Finger-to-nose normal.      Relevant Results  Results for orders placed or performed during the hospital encounter of 07/10/24 (from the past 24 hour(s))   Transesophageal Echo (KACIE)   Result Value Ref Range    BSA 1.62 m2   POCT GLUCOSE   Result Value Ref Range    POCT Glucose 71 (L) 74 - 99 mg/dL   POCT GLUCOSE   Result Value Ref Range    POCT Glucose 128 (H) 74 - 99 mg/dL   CBC   Result Value Ref Range    WBC 5.8 4.4 - 11.3 x10*3/uL    nRBC 0.0 0.0 - 0.0 /100 WBCs    RBC 4.16 4.00 - 5.20 x10*6/uL    Hemoglobin 12.1 12.0 - 16.0 g/dL    Hematocrit 36.1 36.0 - 46.0 %    MCV 87 80 - 100 fL    MCH 29.1 26.0 - 34.0 pg    MCHC 33.5 32.0 - 36.0 g/dL    RDW 13.2 11.5 - 14.5 %    Platelets 239 150 - 450 x10*3/uL   Comprehensive metabolic panel   Result Value Ref Range    Glucose 84 74 - 99 mg/dL    Sodium 139 136 - 145 mmol/L    Potassium 3.9 3.5 - 5.3 mmol/L    Chloride 107 98 - 107 mmol/L    Bicarbonate 25 21 - 32 mmol/L    Anion Gap 11 10 - 20 mmol/L    Urea Nitrogen 21 6 - 23 mg/dL    Creatinine 0.88 0.50 - 1.05 mg/dL    eGFR 69 >60 mL/min/1.73m*2    Calcium 8.7 8.6 - 10.3 mg/dL    Albumin 3.9 3.4 - 5.0 g/dL    Alkaline Phosphatase 69 33 - 136 U/L    Total Protein 6.6 6.4 - 8.2 g/dL    AST 17 9 - 39 U/L    Bilirubin, Total 0.4 0.0 - 1.2 mg/dL    ALT 17 7 - 45 U/L   Light Blue Top   Result Value Ref Range    Extra Tube Hold for add-ons.    SST TOP   Result Value Ref Range    Extra Tube Hold for add-ons.    POCT GLUCOSE   Result Value Ref Range    POCT Glucose 88 74 - 99 mg/dL   POCT GLUCOSE   Result Value Ref Range    POCT Glucose 125 (H) 74 - 99 mg/dL   CT angio head w and wo IV contrast    Result  Date: 7/11/2024  Interpreted By:  Rocky Herman, STUDY: CT ANGIO HEAD W AND WO IV CONTRAST; ;  7/10/2024 6:58 pm   INDICATION: Signs/Symptoms:recent stroke coming in with new symptoms.   COMPARISON: None.   ACCESSION NUMBER(S): [id number]   ORDERING CLINICIAN: DEMETRIUS CABALLERO   TECHNIQUE: Thin cut axial CT images were generated over the upper thorax, neck, and head during the arterial passage of a full contrast bolus.  The bolus was generated with a power injector and followed with immediate saline flush. These image data were subtracted and then used for 3-D reconstructions. Maximum intensity projections and shaded surface displays were generated in multiple planes. In addition images were transferred into a 3-D processing program and additional projections and displays were reviewed by the interpreting physician.   The post processed 3D images from the imaged lab are not available at the time of dictation despite phone call attempts by radiology operations and myself to have the post processed.   FINDINGS: The CT angiogram through the upper thorax demonstrates no significant stenosis along the origins of the right brachiocephalic artery, left common carotid, or left subclavian artery. No significant stenosis noted along the origins of the vertebral arteries.   The CT angiogram of the neck demonstrates mild partially calcified atherosclerotic plaque along the distal left common carotid artery without significant focal stenosis. No significant stenosis noted along the left carotid bifurcation or cervical segment of the left internal carotid artery. No significant stenosis noted along the right common carotid artery, right carotid bifurcation, or cervical segment of the right internal carotid artery. No significant stenosis noted along the cervical segments of the vertebral arteries. There is a left dominant vertebral artery.   There is mild atherosclerotic calcification noted along the bilateral carotid siphons  without significant focal stenosis.  No significant stenosis noted along the distal vertebral arteries or basilar artery. There is a fetal origin of the left posterior cerebral artery. The A1 segment of the right anterior cerebral artery is not well defined which may be related to congenital hypoplasia or secondary narrowing. The more distal right anterior cerebral artery is supplied from the left via a patent anterior communicating artery. There is asymmetric diminished caliber of M2 and more distal branch vessels of the right middle cerebral artery when compared with the left.   The source CT images of the head demonstrate mild-to-moderate brain parenchymal volume loss. There are nonspecific white matter changes within the cerebral hemispheres bilaterally which while nonspecific, given the patient's age, may represent sequelae of small-vessel ischemic change. There is no midline shift. There is a retention cyst or polyp within the left maxillary sinus.   The source CT angiogram images of the neck demonstrate multilevel cervical spondylosis. There is a nonspecific nodular hypodensity noted within the inferior left lobe of the thyroid gland measuring approximately 23 mm in greatest axial dimension.   Further evaluation with nonemergent thyroid ultrasound.(managing Incidental Thyroid Nodules Detected on Imaging: White Paper of the ACR Incidental Thyroid Findings Committee. Ora Haines. et al. Journal of the American College of Radiology,Volume 12, Issue 2, 143 - 150.)       The CT angiogram through the upper thorax demonstrates no significant stenosis along the origins of the right brachiocephalic artery, left common carotid, or left subclavian artery. No significant stenosis noted along the origins of the vertebral arteries.   The CT angiogram of the neck demonstrates mild partially calcified atherosclerotic plaque along the distal left common carotid artery without significant focal stenosis. No significant  stenosis noted along the left carotid bifurcation or cervical segment of the left internal carotid artery. No significant stenosis noted along the right common carotid artery, right carotid bifurcation, or cervical segment of the right internal carotid artery. No significant stenosis noted along the cervical segments of the vertebral arteries. There is a left dominant vertebral artery.   There is mild atherosclerotic calcification noted along the bilateral carotid siphons without significant focal stenosis. No significant stenosis noted along the distal vertebral arteries or basilar artery. There is a fetal origin of the left posterior cerebral artery. The A1 segment of the right anterior cerebral artery is not well defined which may be related to congenital hypoplasia or secondary narrowing. The more distal right anterior cerebral artery is supplied from the left via a patent anterior communicating artery. There is asymmetric diminished caliber of M2 and more distal branch vessels of the right middle cerebral artery when compared with the left.   The source CT images of the head demonstrate mild-to-moderate brain parenchymal volume loss. There are nonspecific white matter changes within the cerebral hemispheres bilaterally which while nonspecific, given the patient's age, may represent sequelae of small-vessel ischemic change. Given the above CT angiogram findings and clinical history, further evaluation with a MRI of the brain with diffusion-weighted imaging could be considered for possible superimposed acute infarction as it is a more sensitive examination.   The source CT angiogram images of the neck demonstrate multilevel cervical spondylosis. There is a nonspecific nodular hypodensity noted within the inferior left lobe of the thyroid gland measuring approximately 23 mm in greatest axial dimension.   Further evaluation with nonemergent thyroid ultrasound.(managing Incidental Thyroid Nodules Detected on  Imaging: White Paper of the ACR Incidental Thyroid Findings Committee. Ora Haines et al. Journal of the American College of Radiology,Volume 12, Issue 2, 143 - 150.)     MACRO: Critical Finding:  See findings. Notification was initiated on 7/11/2024 at 8:50 am by  Rocky Herman.  (**-OCF-**)   Signed by: Rocky Herman 7/11/2024 8:51 AM Dictation workstation:   [id number]   Scheduled medications   Medication Dose Route Frequency    amLODIPine  10 mg oral Daily    apixaban  5 mg oral q12h    aspirin  81 mg oral Daily    atenolol  100 mg oral Daily    atorvastatin  80 mg oral Nightly    docusate sodium  100 mg oral BID    [START ON 7/14/2024] ergocalciferol  1,250 mcg oral Every Sunday    losartan 100 mg, hydroCHLOROthiazide 25 mg for Hyzaar 100/25   oral Daily    melatonin  3 mg oral Daily    pantoprazole  40 mg oral Daily before breakfast     PRN medications   Medication    acetaminophen    Or    acetaminophen    Or    acetaminophen    acetaminophen    Or    acetaminophen    Or    acetaminophen    butamben-tetracaine-benzocaine    dextrose    dextrose    diclofenac sodium    fentaNYL PF    glucagon    glucagon    lidocaine    midazolam    ondansetron ODT    Or    ondansetron    oxyCODONE    oxygen           NIH Stroke Scale  1A. Level of Consciousness: Alert, Keenly Responsive  1B. Ask Month and Age: Both Questions Right  1C. Blink Eyes & Squeeze Hands: Performs Both Tasks  2. Best Gaze: Normal  3. Visual: No Visual Loss  4. Facial Palsy: Normal Symmetrical Movements  5A. Motor - Left Arm: No Drift  5B. Motor - Right Arm: No Drift  6A. Motor - Left Leg: No Drift  6B. Motor - Right Leg: No Drift  7. Limb Ataxia: Absent  8. Sensory Loss: Mild-to-Moderate Sensory Loss  9. Best Language: No Aphasia  10. Dysarthria: Normal  11. Extinction and Inattention: No Abnormality  NIH Stroke Scale: 1           Jada Coma Scale  Best Eye Response: Spontaneous  Best Verbal Response: Oriented  Best Motor Response: Follows  commands  [place] Coma Scale Score: 15                             Assessment/Plan      Principal Problem:    Stroke-like symptoms    Impression:  TIA with left sided deficit-resolved  Moderately sized PFO found on TTE  Known right MCA M1 occlusion  History of HTN, HLD  Recommendations:   Continue with Eliquis and statin  PT/OT to determine placement due to patient not having a working elevator in her apartment building.   Follow up with me as oupt.     Discussed bleeding precautions with patient while on anti platelet and/or anticoagulation therapy. Discussed stroke prevention such as: HgbA1c <7, tight blood pressure control <130/<80, LDL <70 with statin therapy (if indicated), CPAP/BiPAP compliance (if indicated) and lifestyle modification (Mediterranean diet, daily exercise, nicotine cessation & limiting alcohol use).   Discussed s/sx of stroke such as: face drooping, arm/leg weakness, speech difficulty; sudden numbness of face/extremity, sudden confusion, sudden trouble seeing, sudden trouble walking, sudden severe headache, dizziness, loss of balance or coordination and to call 911 immediately.?      No further needs from neurology; okay for transfer or discharge as per primary team. Please contact if condition changes for re-eval.      I have discussed the patient and the plan of care with the Neurologist on-call, Dr. Lee.       I spent 30 minutes in the professional and overall care of this patient.      AYAAN Mathew-CNP

## 2024-07-12 NOTE — PROGRESS NOTES
Inpatient Speech-Language Pathology Clinical Swallow Evaluation       Patient Name: Moon Martinez  MRN: [id number]  : 1950  Today's Date: 24  Time Calculation  Start Time: 1300  Stop Time: 1314  Time Calculation (min): 14 min         Recommendations:  Solid Diet Recommendations : Regular (IDDSI Level 7)   Liquid Diet Recommendations: Thin (IDDSI Level 0)   Compensatory Swallowing Strategies: Alternate solids and liquids, Upright 90 degrees as possible for all oral intake     Medication Administration Recommendations: Whole, With Liquid    Medical Staff Made Aware: Yes        Assessment:  Assessment Results: Pt presenting with a suspected functional swallow at bedside. Pt reported she has been having coughing episodes since she had COVID last year. She tolerated all trials of intake with no s/s aspiration. Minutes after intake and while talking pt had x2 coughing episodes.   Do not suspect coughing is related to aspiration. Lungs stating clear. Coughing could be related to esophageal dysfunction or vocal fold dysfunction.   Recommended ENT and modified barium swallow study to further assess esophageal phase. Pt refused, stating she did not wish to complete at this time.      Respiratory Status: Room air   Patient Positioning: Upright in Bed   Consistencies Trialed: Consistencies Trialed: Thin (IDDSI Level 0) - Straw, Thin (IDDSI Level 0) - Cup, Regular (IDDSI Level 7)   Oral Motor: Within Functional Limits  Lingual Strength: Within Functional Limits   Dentition: Adequate/Natural        Plan:  SLP Plan: Skilled SLP Skilled speech therapy for dysphagia treatment is warranted in order to provide training and instruction regarding the use of compensatory swallow strategies, assess tolerance of diet and pt/caregiver education in order to reduce risk of aspiration, dehydration and malnutrition.  SLP Frequency: Follow-up visit only   Duration: 2 weeks   Next Treatment Priority: Check diet/strategies   Discussed  POC: Patient, Nursing   Discussed Risks/Benefits: Yes   Patient/Caregiver Agreeable: Yes   Prognosis: Good  SLP Discharge Recommendations: unable to determine at this time, refer to subsequent notes    Goals:  Goals established on 07/12/24  Pt. To tolerate least restrictive diet without pulmonary compromise   Pt. To independently use safe swallow strategies to decrease risk of aspiration and increase esophageal clearance in all observed trials of intake      Subjective   Pt seen at bedside, visitor present         General Visit Information:  Pt. Admitted  from ED with slurred speech and L arm numbness. Now resolved. Dx L sided TIA   Past medical history: hypertension, hyperlipidemia, recent TIA     Living Environment: Home           Reason for Referral: coughing with intake      Current Diet : Regular/thin   Prior to Session Communication: Bedside nurse   Pain:  Pain Assessment  Pain Assessment: 0-10  0-10 (Numeric) Pain Score: 0 - No pain         Treatment Completed with evaluation:   No      SLP Inpatient Education:  Pt. educated on safe swallow strategies, role of SLP, recommended diet, recommendation of Modified Barium swallow study and procedure ways to increase esophageal clearance   Patient gave verbal understanding

## 2024-07-12 NOTE — DISCHARGE SUMMARY
Discharge Diagnosis  Stroke-like symptoms    Issues Requiring Follow-Up  PCP and cardiology follow up as outpatient    Discharge Meds     Your medication list        START taking these medications        Instructions Last Dose Given Next Dose Due   apixaban 5 mg tablet  Commonly known as: Eliquis  Start taking on: July 13, 2024      Take 1 tablet (5 mg) by mouth every 12 hours.              CHANGE how you take these medications        Instructions Last Dose Given Next Dose Due   atorvastatin 80 mg tablet  Commonly known as: Lipitor  What changed:   medication strength  how much to take      Take 1 tablet (80 mg) by mouth once daily at bedtime.              CONTINUE taking these medications        Instructions Last Dose Given Next Dose Due   amLODIPine 10 mg tablet  Commonly known as: Norvasc           aspirin 81 mg EC tablet           atenolol 100 mg tablet  Commonly known as: Tenormin           cholecalciferol 50,000 unit capsule  Commonly known as: Vitamin D-3           diclofenac sodium 1 % gel  Commonly known as: Voltaren           losartan-hydrochlorothiazide 100-25 mg tablet  Commonly known as: Hyzaar                  STOP taking these medications      clopidogrel 75 mg tablet  Commonly known as: Plavix                  Where to Get Your Medications        These medications were sent to [facility]/pharmacy #[id number] - [address] AT [place] [id number]      Phone: [phone]   apixaban 5 mg tablet  atorvastatin 80 mg tablet         Test Results Pending At Discharge  Pending Labs       No current pending labs.            Hospital Course     #new strokelike symptoms with recent right MCA ischemic stroke   #Moderate sized PFO  -  Neurology consulted appreciate recommendations  -Cardiology consulted to give the recent finding of PFO on KACIE.  Would like them to weigh in on urgency of PFO repair  -  continue with permissive hypertension.  Will start correcting blood  pressure tomorrow.  -continue aspirin and statin  -switch plavix to eliquis  -s/p KACIE, Positive bubble study with right-to-left shunt and bubbles are greater than 10, consistent with moderate PFO   -Follow-up appointment made with Dr. Mejía for scheduling of PFO closure.       #Hypertension  #Hyperlipidemia  -Will continue home meds.     Patient is doing better today.  She will be discharged home today.  Advised her to follow-up with her PCP and cardiology as outpatient.      Discharge time spent more than 30 minutes.    Pertinent Physical Exam At Time of Discharge  Physical Exam    Constitutional: No acute distress, awake, alert  Head/Neck: Neck supple  Respiratory/Thorax: Lungs are Clear to auscultation  Cardiovascular: Regular, rate and rhythm,  2+ equal pulses of the extremities, normal S 1and S 2  Gastrointestinal: Nondistended, soft, non-tender, no rebound tenderness or guarding  Extremities: No edema. No calf tenderness.  Neurological: Awake and alert. Sensory deficit (left hand forearm and arm to shoulder) present.   Psychological: Appropriate mood and behavior    Outpatient Follow-Up  Future Appointments   Date Time Provider Department Center   7/25/2024  9:00 AM Umer Mejía MD [id number] [place]         Caleb Mittal MD

## 2024-07-14 LAB
ATRIAL RATE: 49 BPM
P AXIS: 57 DEGREES
P OFFSET: 192 MS
P ONSET: 145 MS
PR INTERVAL: 160 MS
Q ONSET: 225 MS
QRS COUNT: 8 BEATS
QRS DURATION: 88 MS
QT INTERVAL: 498 MS
QTC CALCULATION(BAZETT): 449 MS
QTC FREDERICIA: 465 MS
R AXIS: -3 DEGREES
T AXIS: 110 DEGREES
T OFFSET: 474 MS
VENTRICULAR RATE: 49 BPM

## 2024-07-16 ENCOUNTER — PATIENT OUTREACH (OUTPATIENT)
Dept: PRIMARY CARE | Facility: CLINIC | Age: 74
End: 2024-07-16
Payer: COMMERCIAL

## 2024-07-16 NOTE — PROGRESS NOTES
Discharge Facility:  [facility]    Discharge Diagnosis:  stroke-like symptoms     Admission Date:7/10/24  Discharge Date: 7/12/24    PCP Appointment Date:  7/23/24 11:50am   [facility] [place], [place] [phone]     Specialist Appointment Date:    Visit Type: Cardiology Hospital Discharge          Date: 7/25/2024                  Dept: [facility]                  Provider: Umer Mejía                  Time: 9:00 AM    Hospital Encounter and Summary Linked: Yes  See discharge assessment below for further details    Engagement  Call Start Time: 1010 (7/16/2024 10:43 AM)    Medications  Medications reviewed with patient/caregiver?: Yes (7/16/2024 10:43 AM)  Is the patient having any side effects they believe may be caused by any medication additions or changes?: No (7/16/2024 10:43 AM)  Does the patient have all medications ordered at discharge?: Yes (7/16/2024 10:43 AM)  Care Management Interventions: Provided patient education (7/16/2024 10:43 AM)  Prescription Comments: START taking these medications         Instructions  Last Dose Given  Next Dose Due  apixaban 5 mg tablet  Commonly known as: Eliquis  Start taking on: July 13, 2024        Take 1 tablet (5 mg) by mouth every 12 hours.                    CHANGE how you take these medications         Instructions  Last Dose Given  Next Dose Due  atorvastatin 80 mg tablet  Commonly known as: Lipitor  What changed:   medication strength  how much to take        Take 1 tablet (80 mg) by mouth once daily at bedtime.                    CONTINUE taking these medications         Instructions  Last Dose Given  Next Dose Due  amLODIPine 10 mg tablet  Commonly known as: Norvasc                 aspirin 81 mg EC tablet                 atenolol 100 mg tablet  Commonly known as: Tenormin                 cholecalciferol 50,000 unit capsule  Commonly known as: Vitamin D-3                 diclofenac  sodium 1 % gel  Commonly known as: Voltaren                 losartan-hydrochlorothiazide 100-25 mg tablet  Commonly known as: Hyzaar                          STOP taking these medications      clopidogrel 75 mg tablet  Commonly known as: Plavix (7/16/2024 10:43 AM)  Is the patient taking all medications as directed (includes completed medication regime)?: Yes (7/16/2024 10:43 AM)  Care Management Interventions: Provided patient education (7/16/2024 10:43 AM)  Medication Comments: We went over each of her medications with her bottles and helped to orgianize and remove any DC medications. Patient was refering to an old discharge paper for med list. She updated and wrote down new list. She searched through her papers to try to find new discharge but was not able. At end of call she remembered that her new discharge maybe hanging up so she will look for it. (7/16/2024 10:43 AM)    Appointments  Does the patient have a primary care provider?: Yes (7/16/2024 10:43 AM)  Care Management Interventions: Verified appointment date/time/provider (7/16/2024 10:43 AM)  Has the patient kept scheduled appointments due by today?: Yes (7/16/2024 10:43 AM)  Care Management Interventions: Educated on importance of keeping appointment; Advised patient to keep appointment (Reviewed details of Cardio appt on 7/25/24 - I gave patient address , date and time and she wrote it down.) (7/16/2024 10:43 AM)    Self Management  Has home health visited the patient within 72 hours of discharge?: Not applicable (7/16/2024 10:43 AM)  What Durable Medical Equipment (DME) was ordered?: n/a (7/16/2024 10:43 AM)    Patient Teaching  Does the patient have access to their discharge instructions?: No (7/16/2024 10:43 AM)  Care Management Interventions: Reviewed instructions with patient (pt could not find current discharge- I stayed on phone with her while she was looking through her papers but only had past discharges. At end of call she thinks she may have  hung up so will look. I verbally went over all details of the summary with Moon.) (7/16/2024 10:43 AM)  What is the patient's perception of their health status since discharge?: Same (7/16/2024 10:43 AM)  Is the patient/caregiver able to teach back the hierarchy of who to call/visit for symptoms/problems? PCP, Specialist, Home Health nurse, Urgent Care, ED, 911: Yes (7/16/2024 10:43 AM)  Patient/Caregiver Education Comments: I introduced myself and the TCM program to Moon Martinez. Reviewed hospital stay and answered any questions. I gave my contact information and encouraged to call me if needing assistance or has any further non-emergent questions prior to my next outreach. (7/16/2024 10:43 AM)    Wrap Up  Wrap Up Additional Comments: Moon expressed that she was overwhelmed with her medications and does not like taking pills. She had misplaced her discharge summary and was unaware that she needed to stop plavix . We reviewed her medications in detail and she read me her bottle and but current bottles into a pill while she wrote down list as well. She had already removed plavix so must have rememebered at one time. She now feels that she has everything in order. I recommended that she take all her current medications to both her upcoming appts with PCP and Cardio. (7/16/2024 10:43 AM)  Call End Time: 1100 (7/16/2024 10:43 AM)

## 2024-07-18 LAB
EJECTION FRACTION: 63 %
RIGHT VENTRICLE PEAK SYSTOLIC PRESSURE: 12.6 MMHG

## 2024-07-22 NOTE — DOCUMENTATION CLARIFICATION NOTE
"    PATIENT:               VICTOR M JERONIMO  ACCT #:                  [id number]  MRN:                       [id number]  :                       1950  ADMIT DATE:       7/10/2024 10:59 AM  DISCH DATE:        2024 2:55 PM  RESPONDING PROVIDER #:        [id number]          PROVIDER RESPONSE TEXT:    TIA/stroke symptoms related to recent CVA of MCA    CDI QUERY TEXT:    Clarification    Instruction:    Based on your assessment of the patient and the clinical information, please provide the requested documentation by clicking on the appropriate radio button and enter any additional information if prompted.    Question: Please clarify if a relationship exists between    When answering this query, please exercise your independent professional judgment. The fact that a question is being asked, does not imply that any particular answer is desired or expected.    The patient's clinical indicators include:  Clinical Information: 73 yo female admitted with stroke symptoms. PMHx:  recent right MCA M1 segment occlusion leading to CVA    Clinical Indicators:    KACIE: Positive bubble study with right-to-left shunt and bubbles are greater than 10, consistent with moderate PFO ()    CT Head: IMPRESSION: No acute intracranial bleed or focal mass effect (7/10)     Neurology Dr. Coyne  \"Impression:  TIA with left sided deficit-resolved  Moderately sized PFO found on TTE  Known right MCA M1 occlusion\"     DCN Dr. Mittal:  \"Discharge Diagnosis Stroke-like symptoms  new strokelike symptoms with recent right MCA ischemic stroke  Moderate sized PFO     PN [address] NP: \"TIA (transient ischemic attack)\"     Dr. Vasquez Consult: 'Repeat MRI of the brain did not show any acute stroke\"    7/10 HP Dr. Dasilva: \"coming in with left face numbness, slurred speech left arm numbness and tingling similar to when she presented for stroke.   Patient does endorse not facial numbness as well as most of the deficits of slurred speech " "have resolved.  Still having left arm symptoms.  Does endorse some blurry vision particularly more evident on the left side\"    7/10 ED Dr. Hollis: \"repeat MRI given new sustained symptoms of left arm numbness and cardiology consult for PFO closure. Agrees with no TNK given nondisabling symptoms\"  \"recent TIA who is presenting with reported slurred speech and left arm numbness.  Patient noticed symptoms starting around 9:30 AM.  Recently started on dual antiplatelet therapy for recent TIA.  Patient's slurred speech has resolved, reports continued mild decreased sensation in the left upper extremity.\"      Treatment: Eliquis, statin, outpatient PFO closure, permissive HTN, Neuro consult    Risk Factors: recent CVA, PFO  Options provided:  -- TIA/stroke symptoms related to recent CVA of MCA  -- TIA/stroke symptoms unrelated recent CVA of MCA  -- Other - I will add my own diagnosis  -- Refer to Clinical Documentation Reviewer    Query created by: Edelmira Brown on 7/22/2024 11:21 AM      Electronically signed by:  MARI MUNOZ MD 7/22/2024 11:45 AM          "

## 2024-07-25 ENCOUNTER — APPOINTMENT (OUTPATIENT)
Dept: CARDIOLOGY | Facility: CLINIC | Age: 74
End: 2024-07-25
Payer: COMMERCIAL

## 2024-07-25 ENCOUNTER — PATIENT OUTREACH (OUTPATIENT)
Dept: PRIMARY CARE | Facility: CLINIC | Age: 74
End: 2024-07-25

## 2024-07-25 NOTE — PROGRESS NOTES
Unable to reach patient for call back after patient's follow up appointment with Cardio today.   Encouraged on message to also keep upcoming new pt appt with PCP Dr Watson.   LVM with call back number for patient to call if needed to assist with any questions or concerns patient may have.

## 2024-07-30 ENCOUNTER — APPOINTMENT (OUTPATIENT)
Dept: CARDIOLOGY | Facility: CLINIC | Age: 74
End: 2024-07-30
Payer: COMMERCIAL

## 2024-07-30 ENCOUNTER — LAB (OUTPATIENT)
Dept: LAB | Facility: LAB | Age: 74
End: 2024-07-30
Payer: COMMERCIAL

## 2024-07-30 VITALS
DIASTOLIC BLOOD PRESSURE: 60 MMHG | SYSTOLIC BLOOD PRESSURE: 108 MMHG | WEIGHT: 125 LBS | HEIGHT: 58 IN | HEART RATE: 56 BPM | BODY MASS INDEX: 26.24 KG/M2

## 2024-07-30 DIAGNOSIS — I10 PRIMARY HYPERTENSION: ICD-10-CM

## 2024-07-30 DIAGNOSIS — Z78.9 NEVER SMOKED TOBACCO: ICD-10-CM

## 2024-07-30 DIAGNOSIS — Q21.12 PFO (PATENT FORAMEN OVALE) (HHS-HCC): ICD-10-CM

## 2024-07-30 DIAGNOSIS — G45.9 TIA (TRANSIENT ISCHEMIC ATTACK): ICD-10-CM

## 2024-07-30 DIAGNOSIS — E78.2 MIXED HYPERLIPIDEMIA: ICD-10-CM

## 2024-07-30 DIAGNOSIS — Q21.12 PFO (PATENT FORAMEN OVALE) (HHS-HCC): Primary | ICD-10-CM

## 2024-07-30 PROBLEM — N18.9 CHRONIC KIDNEY DISEASE: Status: ACTIVE | Noted: 2024-07-30

## 2024-07-30 PROBLEM — M62.08 DIASTASIS RECTI: Status: ACTIVE | Noted: 2017-01-24

## 2024-07-30 LAB
ANION GAP SERPL CALC-SCNC: 13 MMOL/L (ref 10–20)
BUN SERPL-MCNC: 42 MG/DL (ref 6–23)
CALCIUM SERPL-MCNC: 9.7 MG/DL (ref 8.6–10.3)
CHLORIDE SERPL-SCNC: 104 MMOL/L (ref 98–107)
CO2 SERPL-SCNC: 29 MMOL/L (ref 21–32)
CREAT SERPL-MCNC: 1.27 MG/DL (ref 0.5–1.05)
EGFRCR SERPLBLD CKD-EPI 2021: 44 ML/MIN/1.73M*2
ERYTHROCYTE [DISTWIDTH] IN BLOOD BY AUTOMATED COUNT: 13.1 % (ref 11.5–14.5)
GLUCOSE SERPL-MCNC: 95 MG/DL (ref 74–99)
HCT VFR BLD AUTO: 41.7 % (ref 36–46)
HGB BLD-MCNC: 13.3 G/DL (ref 12–16)
MCH RBC QN AUTO: 28.2 PG (ref 26–34)
MCHC RBC AUTO-ENTMCNC: 31.9 G/DL (ref 32–36)
MCV RBC AUTO: 89 FL (ref 80–100)
NRBC BLD-RTO: 0 /100 WBCS (ref 0–0)
PLATELET # BLD AUTO: 298 X10*3/UL (ref 150–450)
POTASSIUM SERPL-SCNC: 4.8 MMOL/L (ref 3.5–5.3)
RBC # BLD AUTO: 4.71 X10*6/UL (ref 4–5.2)
SODIUM SERPL-SCNC: 141 MMOL/L (ref 136–145)
WBC # BLD AUTO: 5.8 X10*3/UL (ref 4.4–11.3)

## 2024-07-30 PROCEDURE — 99214 OFFICE O/P EST MOD 30 MIN: CPT | Performed by: INTERNAL MEDICINE

## 2024-07-30 PROCEDURE — 3074F SYST BP LT 130 MM HG: CPT | Performed by: INTERNAL MEDICINE

## 2024-07-30 PROCEDURE — 1111F DSCHRG MED/CURRENT MED MERGE: CPT | Performed by: INTERNAL MEDICINE

## 2024-07-30 PROCEDURE — 1159F MED LIST DOCD IN RCRD: CPT | Performed by: INTERNAL MEDICINE

## 2024-07-30 PROCEDURE — 36415 COLL VENOUS BLD VENIPUNCTURE: CPT

## 2024-07-30 PROCEDURE — 80048 BASIC METABOLIC PNL TOTAL CA: CPT

## 2024-07-30 PROCEDURE — 85027 COMPLETE CBC AUTOMATED: CPT

## 2024-07-30 PROCEDURE — 3078F DIAST BP <80 MM HG: CPT | Performed by: INTERNAL MEDICINE

## 2024-07-30 PROCEDURE — 1036F TOBACCO NON-USER: CPT | Performed by: INTERNAL MEDICINE

## 2024-07-30 RX ORDER — CLOPIDOGREL BISULFATE 75 MG/1
75 TABLET ORAL DAILY
Qty: 90 TABLET | Refills: 3 | Status: SHIPPED | OUTPATIENT
Start: 2024-07-30 | End: 2025-07-30

## 2024-07-30 RX ORDER — SODIUM CHLORIDE 9 MG/ML
100 INJECTION, SOLUTION INTRAVENOUS CONTINUOUS
Status: CANCELLED | OUTPATIENT
Start: 2024-07-30

## 2024-07-30 NOTE — H&P (VIEW-ONLY)
Referred by Caleb Glez MD provider found for   Chief Complaint   Patient presents with    Hospital Follow-up     Due to stroke and follow up on KACIE results        History of Present Illness  Moon Martinez is a 74 y.o. year old female patient is here for evaluation of PFO closure.  She is a patient who was admitted with TIA-like episode where she had acute numbness in the left upper extremity with weakness and numbness in the right face.  Residual neurological deficits there is some numbness and weakness in the left lower and upper extremities.  Apparently she had a similar episode a few years ago where she was also admitted to the hospital for that.  Her CT of the head showed no acute event.  She had transesophageal echo showed sizable PFO with bidirectional shunt.  EKG shows sinus rhythm and no acute changes.  I discussed with the patient in great length that the fact that she had neurological event with large PFO would be a suitable to close the PFO.  She is currently on Eliquis.  I suggest to stop Eliquis 3 days prior to procedure and start Plavix 75 mg p.o. daily.  Risk and benefit of procedure were explained to the patient.  She understood.  Based on results further recommendation will be made    Past Medical History  Past Medical History:   Diagnosis Date    Personal history of other diseases of the circulatory system     History of hypertension    Personal history of other endocrine, nutritional and metabolic disease     History of hypercholesterolemia       Social History  Social History     Tobacco Use    Smoking status: Never    Smokeless tobacco: Never   Substance Use Topics    Alcohol use: Not Currently    Drug use: Not Currently       Family History     Family History   Problem Relation Name Age of Onset    Brain Aneurysm Mother      COPD Sister         Review of Systems  As per HPI, all other systems reviewed and negative.    Allergies:  Allergies   Allergen Reactions    Hydralazine  Anaphylaxis    Hydrochlorothiazide Other    Lisinopril GI Upset and Other     Other reaction(s): GI Upset, GI Upset    Morphine Hives        Outpatient Medications:  Current Outpatient Medications   Medication Instructions    amLODIPine (NORVASC) 10 mg, oral, Daily    apixaban (ELIQUIS) 5 mg, oral, Every 12 hours    aspirin 81 mg, oral, Daily    atenolol (TENORMIN) 100 mg, oral, Daily    atorvastatin (LIPITOR) 80 mg, oral, Nightly    cholecalciferol (VITAMIN D-3) 50,000 Units, oral, Once Weekly, Sunday     diclofenac sodium (VOLTAREN) 4 g, Topical, 4 times daily PRN    losartan-hydrochlorothiazide (Hyzaar) 100-25 mg tablet 1 tablet, oral, Daily    pantoprazole (PROTONIX) 40 mg, oral, Daily before breakfast, Do not crush, chew, or split.         Vitals:  Vitals:    07/30/24 0952   BP: 108/60   Pulse: 56       Physical Exam:  Physical Exam  Vitals and nursing note reviewed.   Constitutional:       Appearance: Normal appearance. She is normal weight.   HENT:      Head: Normocephalic and atraumatic.   Eyes:      Extraocular Movements: Extraocular movements intact.      Pupils: Pupils are equal, round, and reactive to light.   Cardiovascular:      Rate and Rhythm: Normal rate and regular rhythm.      Pulses: Normal pulses.   Pulmonary:      Effort: Pulmonary effort is normal.      Breath sounds: Normal breath sounds.   Musculoskeletal:      Cervical back: Normal range of motion.      Right lower leg: No edema.      Left lower leg: No edema.   Skin:     General: Skin is warm and dry.   Neurological:      General: No focal deficit present.      Mental Status: She is alert and oriented to person, place, and time.             Assessment/Plan   Diagnoses and all orders for this visit:  PFO (patent foramen ovale) ([facility])  -     Referral to Cardiology  Primary hypertension  Mixed hyperlipidemia  BMI 26.0-26.9,adult  TIA (transient ischemic attack)  Never smoked tobacco          Umer Mejía MD [facility]  Interventional  Cardiology   of [facility]     Thank you for allowing me to participate in the care of this patient. Please do not hesitate to contact me with any further questions or concerns.

## 2024-07-30 NOTE — PATIENT INSTRUCTIONS
Patient to plan PFO closure with Dr. Umer Mejía MD [facility] in near future.   Please HOLD Eliquis x3 days prior to procedure, at that point you will discontinue entirely.   Please START Clopidogrel (Plavix) x3 days prior to procedure, continue this thru the procedure  Please continue all of your other current medications, including Plavix,  the day of the procedure. This is important.     No other changes today.   Continue same medications and treatments.   Patient educated on proper medication use.   Patient educated on risk factor modification.   Please bring any lab results from other providers / physicians to your next appointment.     Please bring all medicines, vitamins, and herbal supplements with you when you come to the office.     Prescriptions will not be filled unless you are compliant with your follow up appointments or have a follow up appointment scheduled as per instruction of your physician. Refills should be requested at the time of your visit.    Stanley FRANCO RN am scribing for and in the presence of Dr. Umer Mejía MD [facility]

## 2024-07-30 NOTE — PROGRESS NOTES
Referred by Caleb Glez MD provider found for   Chief Complaint   Patient presents with    Hospital Follow-up     Due to stroke and follow up on KACIE results        History of Present Illness  Moon Martinez is a 74 y.o. year old female patient is here for evaluation of PFO closure.  She is a patient who was admitted with TIA-like episode where she had acute numbness in the left upper extremity with weakness and numbness in the right face.  Residual neurological deficits there is some numbness and weakness in the left lower and upper extremities.  Apparently she had a similar episode a few years ago where she was also admitted to the hospital for that.  Her CT of the head showed no acute event.  She had transesophageal echo showed sizable PFO with bidirectional shunt.  EKG shows sinus rhythm and no acute changes.  I discussed with the patient in great length that the fact that she had neurological event with large PFO would be a suitable to close the PFO.  She is currently on Eliquis.  I suggest to stop Eliquis 3 days prior to procedure and start Plavix 75 mg p.o. daily.  Risk and benefit of procedure were explained to the patient.  She understood.  Based on results further recommendation will be made    Past Medical History  Past Medical History:   Diagnosis Date    Personal history of other diseases of the circulatory system     History of hypertension    Personal history of other endocrine, nutritional and metabolic disease     History of hypercholesterolemia       Social History  Social History     Tobacco Use    Smoking status: Never    Smokeless tobacco: Never   Substance Use Topics    Alcohol use: Not Currently    Drug use: Not Currently       Family History     Family History   Problem Relation Name Age of Onset    Brain Aneurysm Mother      COPD Sister         Review of Systems  As per HPI, all other systems reviewed and negative.    Allergies:  Allergies   Allergen Reactions    Hydralazine  Anaphylaxis    Hydrochlorothiazide Other    Lisinopril GI Upset and Other     Other reaction(s): GI Upset, GI Upset    Morphine Hives        Outpatient Medications:  Current Outpatient Medications   Medication Instructions    amLODIPine (NORVASC) 10 mg, oral, Daily    apixaban (ELIQUIS) 5 mg, oral, Every 12 hours    aspirin 81 mg, oral, Daily    atenolol (TENORMIN) 100 mg, oral, Daily    atorvastatin (LIPITOR) 80 mg, oral, Nightly    cholecalciferol (VITAMIN D-3) 50,000 Units, oral, Once Weekly, Sunday     diclofenac sodium (VOLTAREN) 4 g, Topical, 4 times daily PRN    losartan-hydrochlorothiazide (Hyzaar) 100-25 mg tablet 1 tablet, oral, Daily    pantoprazole (PROTONIX) 40 mg, oral, Daily before breakfast, Do not crush, chew, or split.         Vitals:  Vitals:    07/30/24 0952   BP: 108/60   Pulse: 56       Physical Exam:  Physical Exam  Vitals and nursing note reviewed.   Constitutional:       Appearance: Normal appearance. She is normal weight.   HENT:      Head: Normocephalic and atraumatic.   Eyes:      Extraocular Movements: Extraocular movements intact.      Pupils: Pupils are equal, round, and reactive to light.   Cardiovascular:      Rate and Rhythm: Normal rate and regular rhythm.      Pulses: Normal pulses.   Pulmonary:      Effort: Pulmonary effort is normal.      Breath sounds: Normal breath sounds.   Musculoskeletal:      Cervical back: Normal range of motion.      Right lower leg: No edema.      Left lower leg: No edema.   Skin:     General: Skin is warm and dry.   Neurological:      General: No focal deficit present.      Mental Status: She is alert and oriented to person, place, and time.             Assessment/Plan   Diagnoses and all orders for this visit:  PFO (patent foramen ovale) ([facility])  -     Referral to Cardiology  Primary hypertension  Mixed hyperlipidemia  BMI 26.0-26.9,adult  TIA (transient ischemic attack)  Never smoked tobacco          Umer Mejía MD [facility]  Interventional  Cardiology   of [facility]     Thank you for allowing me to participate in the care of this patient. Please do not hesitate to contact me with any further questions or concerns.

## 2024-07-31 ENCOUNTER — TELEPHONE (OUTPATIENT)
Dept: PRIMARY CARE | Facility: CLINIC | Age: 74
End: 2024-07-31
Payer: COMMERCIAL

## 2024-08-01 ENCOUNTER — APPOINTMENT (OUTPATIENT)
Dept: PRIMARY CARE | Facility: CLINIC | Age: 74
End: 2024-08-01
Payer: COMMERCIAL

## 2024-08-07 ENCOUNTER — HOSPITAL ENCOUNTER (OUTPATIENT)
Facility: HOSPITAL | Age: 74
Setting detail: OUTPATIENT SURGERY
Discharge: HOME | End: 2024-08-07
Attending: INTERNAL MEDICINE | Admitting: INTERNAL MEDICINE
Payer: COMMERCIAL

## 2024-08-07 ENCOUNTER — TELEPHONE (OUTPATIENT)
Dept: PRIMARY CARE | Facility: CLINIC | Age: 74
End: 2024-08-07
Payer: COMMERCIAL

## 2024-08-07 ENCOUNTER — APPOINTMENT (OUTPATIENT)
Dept: CARDIOLOGY | Facility: HOSPITAL | Age: 74
End: 2024-08-07
Payer: COMMERCIAL

## 2024-08-07 VITALS
OXYGEN SATURATION: 100 % | RESPIRATION RATE: 14 BRPM | HEART RATE: 92 BPM | TEMPERATURE: 96.8 F | BODY MASS INDEX: 24.63 KG/M2 | WEIGHT: 125.44 LBS | HEIGHT: 60 IN | DIASTOLIC BLOOD PRESSURE: 71 MMHG | SYSTOLIC BLOOD PRESSURE: 129 MMHG

## 2024-08-07 DIAGNOSIS — G45.9 TIA (TRANSIENT ISCHEMIC ATTACK): ICD-10-CM

## 2024-08-07 DIAGNOSIS — R00.1 BRADYCARDIA: ICD-10-CM

## 2024-08-07 DIAGNOSIS — Q21.12 PFO (PATENT FORAMEN OVALE) (HHS-HCC): Primary | ICD-10-CM

## 2024-08-07 DIAGNOSIS — Z87.74 S/P PATENT FORAMEN OVALE CLOSURE: ICD-10-CM

## 2024-08-07 DIAGNOSIS — I10 HTN (HYPERTENSION): ICD-10-CM

## 2024-08-07 LAB
ATRIAL RATE: 46 BPM
ATRIAL RATE: 48 BPM
ATRIAL RATE: 50 BPM
GLUCOSE BLD MANUAL STRIP-MCNC: 108 MG/DL (ref 74–99)
P AXIS: 56 DEGREES
P AXIS: 62 DEGREES
P AXIS: 65 DEGREES
P OFFSET: 192 MS
P OFFSET: 195 MS
P OFFSET: 199 MS
P ONSET: 138 MS
P ONSET: 141 MS
P ONSET: 145 MS
PR INTERVAL: 158 MS
PR INTERVAL: 166 MS
PR INTERVAL: 172 MS
Q ONSET: 224 MS
QRS COUNT: 7 BEATS
QRS COUNT: 8 BEATS
QRS COUNT: 8 BEATS
QRS DURATION: 102 MS
QRS DURATION: 102 MS
QRS DURATION: 96 MS
QT INTERVAL: 496 MS
QT INTERVAL: 500 MS
QT INTERVAL: 528 MS
QTC CALCULATION(BAZETT): 443 MS
QTC CALCULATION(BAZETT): 455 MS
QTC CALCULATION(BAZETT): 462 MS
QTC FREDERICIA: 460 MS
QTC FREDERICIA: 470 MS
QTC FREDERICIA: 483 MS
R AXIS: 14 DEGREES
R AXIS: 17 DEGREES
R AXIS: 24 DEGREES
T AXIS: 109 DEGREES
T AXIS: 116 DEGREES
T AXIS: 140 DEGREES
T OFFSET: 472 MS
T OFFSET: 474 MS
T OFFSET: 488 MS
VENTRICULAR RATE: 46 BPM
VENTRICULAR RATE: 48 BPM
VENTRICULAR RATE: 50 BPM

## 2024-08-07 PROCEDURE — 85347 COAGULATION TIME ACTIVATED: CPT | Performed by: INTERNAL MEDICINE

## 2024-08-07 PROCEDURE — 99153 MOD SED SAME PHYS/QHP EA: CPT | Performed by: INTERNAL MEDICINE

## 2024-08-07 PROCEDURE — 93005 ELECTROCARDIOGRAM TRACING: CPT

## 2024-08-07 PROCEDURE — 85347 COAGULATION TIME ACTIVATED: CPT

## 2024-08-07 PROCEDURE — 82947 ASSAY GLUCOSE BLOOD QUANT: CPT

## 2024-08-07 PROCEDURE — 93580 TRANSCATH CLOSURE OF ASD: CPT | Performed by: INTERNAL MEDICINE

## 2024-08-07 PROCEDURE — 2720000007 HC OR 272 NO HCPCS: Performed by: INTERNAL MEDICINE

## 2024-08-07 PROCEDURE — C1769 GUIDE WIRE: HCPCS | Performed by: INTERNAL MEDICINE

## 2024-08-07 PROCEDURE — 99291 CRITICAL CARE FIRST HOUR: CPT | Performed by: NURSE PRACTITIONER

## 2024-08-07 PROCEDURE — 2500000004 HC RX 250 GENERAL PHARMACY W/ HCPCS (ALT 636 FOR OP/ED): Performed by: INTERNAL MEDICINE

## 2024-08-07 PROCEDURE — 7100000010 HC PHASE TWO TIME - EACH INCREMENTAL 1 MINUTE: Performed by: INTERNAL MEDICINE

## 2024-08-07 PROCEDURE — G0278 ILIAC ART ANGIO,CARDIAC CATH: HCPCS | Performed by: INTERNAL MEDICINE

## 2024-08-07 PROCEDURE — 93010 ELECTROCARDIOGRAM REPORT: CPT | Performed by: INTERNAL MEDICINE

## 2024-08-07 PROCEDURE — C1894 INTRO/SHEATH, NON-LASER: HCPCS | Performed by: INTERNAL MEDICINE

## 2024-08-07 PROCEDURE — 7100000009 HC PHASE TWO TIME - INITIAL BASE CHARGE: Performed by: INTERNAL MEDICINE

## 2024-08-07 PROCEDURE — 2500000001 HC RX 250 WO HCPCS SELF ADMINISTERED DRUGS (ALT 637 FOR MEDICARE OP): Performed by: NURSE PRACTITIONER

## 2024-08-07 PROCEDURE — 93662 INTRACARDIAC ECG (ICE): CPT | Performed by: INTERNAL MEDICINE

## 2024-08-07 PROCEDURE — 93005 ELECTROCARDIOGRAM TRACING: CPT | Mod: 59

## 2024-08-07 PROCEDURE — 99152 MOD SED SAME PHYS/QHP 5/>YRS: CPT | Performed by: INTERNAL MEDICINE

## 2024-08-07 PROCEDURE — 2500000005 HC RX 250 GENERAL PHARMACY W/O HCPCS: Performed by: INTERNAL MEDICINE

## 2024-08-07 PROCEDURE — C1759 CATH, INTRA ECHOCARDIOGRAPHY: HCPCS | Performed by: INTERNAL MEDICINE

## 2024-08-07 PROCEDURE — 2780000003 HC OR 278 NO HCPCS: Performed by: INTERNAL MEDICINE

## 2024-08-07 PROCEDURE — 99024 POSTOP FOLLOW-UP VISIT: CPT | Performed by: NURSE PRACTITIONER

## 2024-08-07 PROCEDURE — C1817 SEPTAL DEFECT IMP SYS: HCPCS | Performed by: INTERNAL MEDICINE

## 2024-08-07 PROCEDURE — 51701 INSERT BLADDER CATHETER: CPT | Mod: 59

## 2024-08-07 PROCEDURE — 2500000004 HC RX 250 GENERAL PHARMACY W/ HCPCS (ALT 636 FOR OP/ED): Performed by: NURSE PRACTITIONER

## 2024-08-07 DEVICE — MULTIFENESTRATED SEPTAL OCCLUDER CRIBRIFORM
Type: IMPLANTABLE DEVICE | Site: CORONARY | Status: FUNCTIONAL
Brand: AMPLATZER™

## 2024-08-07 RX ORDER — CLOPIDOGREL BISULFATE 75 MG/1
75 TABLET ORAL DAILY
Status: DISCONTINUED | OUTPATIENT
Start: 2024-08-08 | End: 2024-08-16 | Stop reason: HOSPADM

## 2024-08-07 RX ORDER — ASPIRIN 325 MG
325 TABLET ORAL ONCE
Status: COMPLETED | OUTPATIENT
Start: 2024-08-07 | End: 2024-08-07

## 2024-08-07 RX ORDER — NAPROXEN SODIUM 220 MG/1
81 TABLET, FILM COATED ORAL DAILY
Status: DISCONTINUED | OUTPATIENT
Start: 2024-08-08 | End: 2024-08-16 | Stop reason: HOSPADM

## 2024-08-07 RX ORDER — HEPARIN SODIUM 1000 [USP'U]/ML
INJECTION, SOLUTION INTRAVENOUS; SUBCUTANEOUS AS NEEDED
Status: DISCONTINUED | OUTPATIENT
Start: 2024-08-07 | End: 2024-08-07 | Stop reason: HOSPADM

## 2024-08-07 RX ORDER — MIDAZOLAM HYDROCHLORIDE 1 MG/ML
INJECTION, SOLUTION INTRAMUSCULAR; INTRAVENOUS AS NEEDED
Status: DISCONTINUED | OUTPATIENT
Start: 2024-08-07 | End: 2024-08-07 | Stop reason: HOSPADM

## 2024-08-07 RX ORDER — ACETAMINOPHEN 325 MG/1
650 TABLET ORAL EVERY 6 HOURS PRN
Status: DISCONTINUED | OUTPATIENT
Start: 2024-08-07 | End: 2024-08-16 | Stop reason: HOSPADM

## 2024-08-07 RX ORDER — LIDOCAINE HYDROCHLORIDE 20 MG/ML
INJECTION, SOLUTION INFILTRATION; PERINEURAL AS NEEDED
Status: DISCONTINUED | OUTPATIENT
Start: 2024-08-07 | End: 2024-08-07 | Stop reason: HOSPADM

## 2024-08-07 RX ORDER — ATENOLOL 100 MG/1
50 TABLET ORAL DAILY
Start: 2024-08-07

## 2024-08-07 RX ORDER — ATROPINE SULFATE 0.1 MG/ML
1 INJECTION INTRAVENOUS ONCE
Status: COMPLETED | OUTPATIENT
Start: 2024-08-07 | End: 2024-08-07

## 2024-08-07 RX ORDER — SODIUM CHLORIDE 9 MG/ML
100 INJECTION, SOLUTION INTRAVENOUS CONTINUOUS
Status: DISCONTINUED | OUTPATIENT
Start: 2024-08-07 | End: 2024-08-07

## 2024-08-07 RX ORDER — FENTANYL CITRATE 50 UG/ML
INJECTION, SOLUTION INTRAMUSCULAR; INTRAVENOUS AS NEEDED
Status: DISCONTINUED | OUTPATIENT
Start: 2024-08-07 | End: 2024-08-07 | Stop reason: HOSPADM

## 2024-08-07 ASSESSMENT — PAIN SCALES - GENERAL

## 2024-08-07 ASSESSMENT — COLUMBIA-SUICIDE SEVERITY RATING SCALE - C-SSRS
2. HAVE YOU ACTUALLY HAD ANY THOUGHTS OF KILLING YOURSELF?: NO
1. IN THE PAST MONTH, HAVE YOU WISHED YOU WERE DEAD OR WISHED YOU COULD GO TO SLEEP AND NOT WAKE UP?: NO
6. HAVE YOU EVER DONE ANYTHING, STARTED TO DO ANYTHING, OR PREPARED TO DO ANYTHING TO END YOUR LIFE?: NO

## 2024-08-07 ASSESSMENT — PAIN - FUNCTIONAL ASSESSMENT: PAIN_FUNCTIONAL_ASSESSMENT: 0-10

## 2024-08-07 NOTE — NURSING NOTE
HOB increased 30 degrees.  No change in bilateral groin sites with position change.  Patient provided with water but did no want anything else at this time.

## 2024-08-07 NOTE — PROGRESS NOTES
Cardiology Progress Note  Patient: Moon Martinez  [facility]  YOB: 1950  MRN: [id number]  Acct: [id number]   Admitting Diagnosis: PFO (patent foramen ovale) ([facility]) [Q21.12]  TIA (transient ischemic attack) [G45.9]  Date:  8/7/2024  Hospital Day: 0  Attending: Umer Mejía MD   Rounding PIOL/Cardiologist:  Marisabel Jonas, APRN-CNP      SUBJECTIVE    Patient developed symptomatic bradycardia at approximately 1454 with heart rates in the low 30s.  She became lightheaded, nauseous and diaphoretic as well as hypotensive with blood pressure 99/54.  Bilateral groin sites remain intact, soft with no drainage, hematoma or bruit.  Stat ECG obtained.  She was given atropine 0.5 mg IV x 1 and IV fluid bolus.  Heart rate improved up to 60's.  No further junctional rhythm.  Blood pressure up to 124/72.  Patient c/o needing to void and pass gas.  She was unable to go on a bedpan.  Bladder scan indicated 354cc.  Patient straight cathed for comfort with approximately 400cc out.  She states she feel better.  Will continue to monitor closely.    VITALS   Visit Vitals  /71   Pulse 92   Temp 36 °C (96.8 °F) (Temporal)   Resp 14     Allergies:  Allergies   Allergen Reactions    Hydralazine Anaphylaxis    Hydrochlorothiazide Other    Lisinopril GI Upset and Other     Other reaction(s): GI Upset, GI Upset    Morphine Hives      PHYSICAL EXAM   Physical Exam  Vitals reviewed.   Constitutional:       Appearance: Normal appearance. She is diaphoretic.      Comments: Lethargic   HENT:      Head: Normocephalic and atraumatic.      Nose: Nose normal.      Mouth/Throat:      Mouth: Mucous membranes are moist.      Pharynx: Oropharynx is clear.   Eyes:      Pupils: Pupils are equal, round, and reactive to light.   Cardiovascular:      Rate and Rhythm: Regular rhythm. Bradycardia present.      Pulses: Normal pulses.      Heart sounds: Normal heart sounds.   Pulmonary:      Effort: Pulmonary effort is  normal.      Breath sounds: Normal breath sounds.   Abdominal:      General: Bowel sounds are normal.      Palpations: Abdomen is soft.   Musculoskeletal:         General: Normal range of motion.      Cervical back: Normal range of motion and neck supple.   Skin:     General: Skin is warm.      Comments: Bilateral groin venous access sites are soft with no drainage, hematoma or bruit.   Neurological:      General: No focal deficit present.      Mental Status: She is oriented to person, place, and time.   Psychiatric:         Mood and Affect: Mood normal.         Behavior: Behavior normal.       LABS     ECG 12 Lead  Result Date: 8/7/2024  Sinus bradycardia Voltage criteria for left ventricular hypertrophy ST & Marked T wave abnormality, consider anterolateral ischemia Abnormal ECG When compared with ECG of 07-AUG-2024 12:43, (unconfirmed) No significant change was found    Cardiac Catheterization Procedure  Result Date: 8/7/2024  CONCLUSIONS:  1. Successful closure of PFO using Amplatzer closure device under intracardiac echo guidance. ICD 10 Codes: Patent foramen ovale-Q21.12  CPT Codes: Percutaneous transcatheter closure of patent ductus arteriosus (PDA)-51831; Moderate Sedation Services 1st additional 15 minutes patient >5 years-11974; Moderate Sedation Services 2nd additional 15 minutes patient >5 years-93472; Intracardiac Echo during intervention-97729  [id number] Mejía MD Performing Physician Electronically signed by Alex Mejía MD on 8/7/2024 at 12:50:06 PM  ** Final **     Electrocardiogram 12 Lead  Result Date: 8/7/2024  Sinus bradycardia Left ventricular hypertrophy with repolarization abnormality Abnormal ECG When compared with ECG of 07-AUG-2024 09:04, (unconfirmed) No significant change was found    ECG 12 lead STAT  Result Date: 8/7/2024  Sinus bradycardia with sinus arrhythmia Voltage criteria for left ventricular hypertrophy ST & Marked T wave abnormality, consider anterolateral ischemia  Abnormal ECG When compared with ECG of 10-JUL-2024 11:29, Criteria for Septal infarct are no longer Present     Encounter Date: 08/07/24   ECG 12 Lead   Result Value    Ventricular Rate 46    Atrial Rate 46    SC Interval 172    QRS Duration 96    QT Interval 528    QTC Calculation(Bazett) 462    P Axis 65    R Axis 17    T Axis 109    QRS Count 7    Q Onset 224    P Onset 138    P Offset 192    T Offset 488    QTC Fredericia 483    Narrative    Sinus bradycardia  Voltage criteria for left ventricular hypertrophy  ST & Marked T wave abnormality, consider anterolateral ischemia  Abnormal ECG  When compared with ECG of 07-AUG-2024 12:43, (unconfirmed)  No significant change was found        Tele Monitoring: Junctional bradycardia 30-40's    Assessment/Plan:   Symptomatic Bradycardia/Hypotension/Junctional Rhythm (3 hours post PFO closure)   -Atropine 0.5 mg IV x 1 dose given as well as IV fluids with good response.   -Patient's baseline heart rates have been upper 40s and 50s.   -Will decrease atenolol 100 mg daily down to 50 mg daily.  PFO - s/p PFO closure today    -Patient to remain on DAPT with aspirin 81 mg daily and Plavix 75 mg daily  3.   Benign essential hypertension   -Currently hypotensive   -Patient states normally hypertensive at home   -Responded well to IV fluids  4.   TIA  5.    Hyperlipidemia   -On statin therapy    Continue to monitor closely on telemetry.  Plan for discharge to home later today barring no further issues.  Outpatient follow-up with an echocardiogram in 4 weeks and follow-up with Dr. Mejía shortly thereafter has been arranged.      Electronically signed by MILADIS French on 8/7/2024 at 3:47 PM

## 2024-08-07 NOTE — NURSING NOTE
"Discharge instructions given via \"teach back\" method to patient and her son.  Instructions included site care, restrictions, discharge medications and follow-up appointments.  In addition PFO closure device card was given. Patient and son verbally stated understanding and all follow-up questions were answered correctly.  Bilateral groin sites were stable on discharge.  Patient was discharged to car by wheelchair.   "

## 2024-08-07 NOTE — POST-PROCEDURE NOTE
Physician Transition of Care Summary  Invasive Cardiovascular Lab    Procedure Date: 8/7/2024  Attending:    * Umer Mejía - Primary  Resident/Fellow/Other Assistant: Surgeons and Role:  * No surgeons found with a matching role *    Indications:   Pre-op Diagnosis      * PFO (patent foramen ovale) (HHS-HCC) [Q21.12]     * TIA (transient ischemic attack) [G45.9]    Post-procedure diagnosis:   Post-op Diagnosis     * PFO (patent foramen ovale) (HHS-HCC) [Q21.12]     * TIA (transient ischemic attack) [G45.9]    Procedure(s):   PFO Closure - Patent Foramen Ovale Closure  93732 - VA PRQ TCAT CLSR CGEN INTRATRL COMUNICAJ W/IMPLT        Procedure Findings:   Tunnel PFO with bidirectional shunt, successful closure of PFO for using 25 mm Amplatzer closure device    Description of the Procedure:   PFO closure using intracardiac echo guidance    Complications:   None    Stents/Implants:   Implants       Implant    Occluder, Amplatzer, 25mm, 8f, 45 Curve, Cform, Septal - [id number] - Implanted        Inventory item: OCCLUDER, AMPLATZER, 25MM, 8F, 45 CURVE, CFORM, SEPTAL Model/Cat number: [id number]    : [employer] Lot number: [id number]    Device identifier: [id number]        As of 8/7/2024       Status: Implanted                              Anticoagulation/Antiplatelet Plan:   Intravenous heparin, Plavix load    Estimated Blood Loss:   1 mL    Anesthesia: Moderate Sedation Anesthesia Staff: No anesthesia staff entered.    Any Specimen(s) Removed:   No specimens collected during this procedure.    Disposition:   Dual antiplatelet therapy okay to discharge today      Electronically signed by: Umer Mejía MD, 8/7/2024 11:53 AM

## 2024-08-07 NOTE — NURSING NOTE
Patient ambulated in halls at 1700.  No complaints of dizziness or light-headedness after ambulation.  Bilateral groin sites remain stable after ambulating.  Patient provided with water, ginger-summer, veronika doone cookies and a boxed meal (turkey sandwich and baked chips).  Patient denies any other needs at this time.

## 2024-08-07 NOTE — DISCHARGE INSTRUCTIONS
PFO CLOSURE  DISCHARGE INSTRUCTIONS     FOR SUDDEN AND SEVERE CHEST PAIN, SHORTNESS OF BREATH, EXCESSIVE BLEEDING, SIGNS OF STROKE, OR CHANGES IN MENTAL STATUS YOU SHOULD CALL 911 IMMEDIATELY.     If your provider has prescribed aspirin and/or clopidogrel (Plavix), or prasugrel (Effient), or ticagrelor (Brilinta), DO NOT STOP THESE MEDICATIONS for any reason without talking to your cardiologist first. If any of these were prescribed, you must take them every day without missing a single dose. If you are getting low on these medications, contact your provider immediately for a refill.     FOR NEXT 24 HOURS    - Upon discharge, you should return home and rest for the remainder of the day and evening. You do not have to stay on bed rest but should not be very active.  It is recommended a responsible adult be with you for the first 24 hours after the procedure.    - No driving for 24 hours after procedure. Please arrange for someone to drive you home from the hospital today.     - Do not operate machinery or use power tools for 24 hours after your procedure.     - Do not make any legal decisions for 24 hours after your procedure.     - Do not drink alcoholic beverages for 24 hours after your procedure.    WOUND CARE   *FOR FEMORAL (LEG) ACCESS*  ·      Avoid heavy lifting (over 10 pounds) for 3 days, squatting or excessive bending for 3 days, and strenuous exercise for 3 days.  ·      No submerged bathing, swimming, or hot tubs for the next 3 days, or until fully healed.  ·      Avoid sexual activity for 3-4 days until any groin discomfort has ceased.    - The transparent dressing should be removed from the site 24 hours after the procedure.   Bilateral dressings can come off 8/8/24 any time after 12:00 noon.    - You may shower the day after your procedure. Wash the site gently with soap and water. Rinse well and pat dry. Keep the area clean and dry. Avoid lotions, ointments, or powders until fully healed.      - It is normal to notice a small bruise around the puncture site and/or a small grape sized or smaller lump. Any large bruising or large lump warrants a call to the office.     - If bleeding should occur, lay down and apply pressure to the affected area for 10 minutes.  If the bleeding stops notify your physician.  If there is a large amount of bleeding or spurting of blood CALL 911 immediately.  DO NOT drive yourself to the hospital.    - You may experience some tenderness, bruising or minimal inflammation.  If you have any concerns or if any of these symptoms become excessive, contact your cardiologist or go to the emergency room.     OTHER INSTRUCTIONS    - You may take acetaminophen (Tylenol) as directed for discomfort.  If pain is not relieved with acetaminophen (Tylenol), contact your doctor.    - If you notice or experience any of the following, you should notify your doctor or seek medical attention  Chest pain or discomfort  Change in mental status or weakness in extremities.  Dizziness, light headedness, or feeling faint.  Change in the site where the procedure was performed, such as bleeding or an increased area of bruising or swelling.  Tingling, numbness, pain, or coolness in the leg/arm beyond the site where the procedure was performed.  Signs of infection (i.e. shaking chills, temperature > 100 degrees Fahrenheit, warmth, redness) in the leg/arm area where the procedure was performed.  Changes in urination   Bloody or black stools  Vomiting blood  Severe nose bleeds  Any excessive bleeding    - If you DO NOT have an appointment with your cardiologist within 2-4 weeks following your procedure, please contact their office.

## 2024-08-07 NOTE — PROGRESS NOTES
Patient is stable status post PFO closure under the care of Dr. Mejía.  Discussed results of procedure with patient.  Pictures provided.  Patient underwent successful implantation of a 25 mm Amplatzer septal closure device using intracardiac echo guidance.  She tolerated the procedure well.  Please see procedural report for complete details.  Patient's Eliquis will be discontinued indefinitely.  She will continue DAPT with aspirin 81 mg daily and Plavix 75 mg daily.  She will be discharged to home later today barring no complications.  She will be scheduled for an echocardiogram with bubble study to evaluate patency of the PFO closure in 1 month with a follow-up shortly thereafter with Dr. Mejía.

## 2024-08-07 NOTE — NURSING NOTE
Patient arrived back to [facility] from Cath Lab s/p PFO Closure.  On arrival focused assessment completed and WDL.  Right and Left Femoral venous sheath puncture sites are stable.  Dressings x 2 bilaterally are clean/dry/intact; sites are soft with no oozing or hematomas noted.  Patient's family is bedside.  Patient has no needs at this time.

## 2024-08-07 NOTE — NURSING NOTE
At 1450 went in patient room and she was lying on her side and c/o of nausea.  Repositioned patient back on to her back.  Gave patient emesis bag; applied cold rag and put on a fan facing patient.  Patient stated she thought she needed to pee.  Took VS and patient's HR was in the mid 30s and BP had dropped.  Set VS to cycle every 5 minutes. Placed patient on bed pan at 1455 but patient was unable to void.  Another RN went go get MI Jonas CNP and apprised her of the situation.  At 1458 POC glucose was taken and result was 108.  IVF were restarted wide open at 1501.  At 1505 12 Lead ECG confirmed SB with HR of 46 At 1509 0.5 mg atropine given.  At 1510 bladder scan showed 345 ml urine in bladder. Patient was straight cathed at 1520 and had output of 400 ml.  Pt's HR after atroprine increased to the low 60s.  BP improved with IVF.  Dr. Mejía came down and saw patient at 1525.  Per Dr. Mejía  we will keep patient a couple more hours as well as make some changes to her discharge medications.  At 1530 increased HOB back to 30 degrees.  As long as patient stays stable, the plan will be to keep her in bed until 1700 and dc home one hour after ambulation.

## 2024-08-08 ENCOUNTER — APPOINTMENT (OUTPATIENT)
Dept: PRIMARY CARE | Facility: CLINIC | Age: 74
End: 2024-08-08
Payer: COMMERCIAL

## 2024-08-12 LAB — ACT BLD: 252 SEC (ref 89–169)

## 2024-08-21 ENCOUNTER — APPOINTMENT (OUTPATIENT)
Dept: PRIMARY CARE | Facility: CLINIC | Age: 74
End: 2024-08-21
Payer: COMMERCIAL

## 2024-08-21 VITALS
DIASTOLIC BLOOD PRESSURE: 70 MMHG | HEIGHT: 60 IN | BODY MASS INDEX: 24.74 KG/M2 | SYSTOLIC BLOOD PRESSURE: 122 MMHG | WEIGHT: 126 LBS | TEMPERATURE: 97.8 F | HEART RATE: 64 BPM

## 2024-08-21 DIAGNOSIS — I10 PRIMARY HYPERTENSION: Primary | ICD-10-CM

## 2024-08-21 DIAGNOSIS — Q21.12 PFO (PATENT FORAMEN OVALE) (HHS-HCC): ICD-10-CM

## 2024-08-21 DIAGNOSIS — K21.9 GERD WITHOUT ESOPHAGITIS: ICD-10-CM

## 2024-08-21 DIAGNOSIS — R53.83 FATIGUE, UNSPECIFIED TYPE: ICD-10-CM

## 2024-08-21 DIAGNOSIS — R29.90 STROKE-LIKE SYMPTOMS: ICD-10-CM

## 2024-08-21 DIAGNOSIS — E78.2 MIXED HYPERLIPIDEMIA: ICD-10-CM

## 2024-08-21 DIAGNOSIS — N18.31 STAGE 3A CHRONIC KIDNEY DISEASE (MULTI): ICD-10-CM

## 2024-08-21 PROCEDURE — 3078F DIAST BP <80 MM HG: CPT | Performed by: INTERNAL MEDICINE

## 2024-08-21 PROCEDURE — 1036F TOBACCO NON-USER: CPT | Performed by: INTERNAL MEDICINE

## 2024-08-21 PROCEDURE — 99203 OFFICE O/P NEW LOW 30 MIN: CPT | Performed by: INTERNAL MEDICINE

## 2024-08-21 PROCEDURE — 1159F MED LIST DOCD IN RCRD: CPT | Performed by: INTERNAL MEDICINE

## 2024-08-21 PROCEDURE — 3074F SYST BP LT 130 MM HG: CPT | Performed by: INTERNAL MEDICINE

## 2024-08-21 PROCEDURE — 1160F RVW MEDS BY RX/DR IN RCRD: CPT | Performed by: INTERNAL MEDICINE

## 2024-08-21 RX ORDER — PANTOPRAZOLE SODIUM 40 MG/1
40 TABLET, DELAYED RELEASE ORAL 2 TIMES DAILY
Qty: 60 TABLET | Refills: 1 | Status: SHIPPED | OUTPATIENT
Start: 2024-08-21 | End: 2024-10-20

## 2024-08-21 RX ORDER — ATORVASTATIN CALCIUM 80 MG/1
80 TABLET, FILM COATED ORAL DAILY
Qty: 100 TABLET | Refills: 1 | Status: SHIPPED | OUTPATIENT
Start: 2024-08-21 | End: 2025-09-25

## 2024-08-21 ASSESSMENT — ENCOUNTER SYMPTOMS
RESPIRATORY NEGATIVE: 1
HEMATOLOGIC/LYMPHATIC NEGATIVE: 1
PSYCHIATRIC NEGATIVE: 1
MUSCULOSKELETAL NEGATIVE: 1
GASTROINTESTINAL NEGATIVE: 1
FATIGUE: 1
CARDIOVASCULAR NEGATIVE: 1

## 2024-08-21 NOTE — PROGRESS NOTES
Subjective   Patient ID: Moon Martinez is a 74 y.o. female who presents for Establish Care (Pt here to  establish Hospital follow up  TIA/Stroke and PFO closure).    HPI   Patient is here to get established she was admitted for strokelike symptoms with left arm numbness and right facial droop she had workup done MRI was negative for acute stroke echocardiogram showed a large PFO which was closed by Dr. Mejía now he is on aspirin and Plavix  Review of Systems   Constitutional:  Positive for fatigue.   HENT: Negative.     Respiratory: Negative.     Cardiovascular: Negative.    Gastrointestinal: Negative.    Genitourinary: Negative.    Musculoskeletal: Negative.    Neurological:         See HPI   Hematological: Negative.    Psychiatric/Behavioral: Negative.     All other systems reviewed and are negative.      Objective   /70   Pulse 64   Temp 36.6 °C (97.8 °F) (Temporal)   Ht 1.524 m (5')   Wt 57.2 kg (126 lb)   BMI 24.61 kg/m²     Physical Exam  Vitals reviewed.   Constitutional:       Appearance: Normal appearance.   HENT:      Head: Normocephalic.   Eyes:      Pupils: Pupils are equal, round, and reactive to light.   Neck:      Vascular: No carotid bruit.   Cardiovascular:      Rate and Rhythm: Normal rate and regular rhythm.   Pulmonary:      Effort: Pulmonary effort is normal.      Breath sounds: Normal breath sounds.   Abdominal:      Palpations: Abdomen is soft.   Lymphadenopathy:      Cervical: No cervical adenopathy.   Neurological:      General: No focal deficit present.      Mental Status: She is alert.         Assessment/Plan   Problem List Items Addressed This Visit             ICD-10-CM    Stroke-like symptoms R29.90    Relevant Medications    atorvastatin (Lipitor) 80 mg tablet    HTN (hypertension) - Primary I10    Relevant Orders    Comprehensive Metabolic Panel    Hyperlipidemia E78.5    PFO (patent foramen ovale) (HHS-HCC) Q21.12    Stage 3a chronic kidney disease (Multi) N18.31     Other  Visit Diagnoses         Codes    GERD without esophagitis     K21.9    Relevant Medications    pantoprazole (ProtoNix) 40 mg EC tablet    Fatigue, unspecified type     R53.83    Relevant Orders    CBC and Auto Differential    TSH with reflex to Free T4 if abnormal        Patient is doing well but is complaining of a lot of fatigue we will check TSH CBC on her.  I reviewed her hospitalization and MRI results.  She is on losartan hydrochlorothiazide for hypertension she has osteoarthritis and is on diclofenac gel.  She is on pantoprazole for GERD.  She will continue beta-blocker aspirin statin and Plavix with history of TIA and stroke she will continue amlodipine along with losartan hydrochlorothiazide for hypertension.  She has chronic kidney disease stage III will monitor her renal functions closely she should not use any oral nonsteroidals.

## 2024-08-23 DIAGNOSIS — K21.9 GERD WITHOUT ESOPHAGITIS: ICD-10-CM

## 2024-08-23 RX ORDER — PANTOPRAZOLE SODIUM 40 MG/1
40 TABLET, DELAYED RELEASE ORAL
Qty: 30 TABLET | Refills: 0 | Status: SHIPPED | OUTPATIENT
Start: 2024-08-23 | End: 2024-09-22

## 2024-08-28 ENCOUNTER — PATIENT OUTREACH (OUTPATIENT)
Dept: CARDIOLOGY | Facility: CLINIC | Age: 74
End: 2024-08-28
Payer: COMMERCIAL

## 2024-08-28 NOTE — PROGRESS NOTES
Unable to reach patient for discharge follow up call.   LVM with call back number for patient to call if needed to assist with any questions or concerns patient may have.      As noted during call prep- out pt procedure done since last call-8/7/24 PFO closure under the care of Dr Mejía

## 2024-09-05 ENCOUNTER — LAB (OUTPATIENT)
Dept: LAB | Facility: LAB | Age: 74
End: 2024-09-05
Payer: COMMERCIAL

## 2024-09-05 DIAGNOSIS — I10 PRIMARY HYPERTENSION: ICD-10-CM

## 2024-09-05 DIAGNOSIS — R53.83 FATIGUE, UNSPECIFIED TYPE: ICD-10-CM

## 2024-09-05 LAB
ALBUMIN SERPL BCP-MCNC: 4.4 G/DL (ref 3.4–5)
ALP SERPL-CCNC: 80 U/L (ref 33–136)
ALT SERPL W P-5'-P-CCNC: 16 U/L (ref 7–45)
ANION GAP SERPL CALC-SCNC: 12 MMOL/L (ref 10–20)
AST SERPL W P-5'-P-CCNC: 19 U/L (ref 9–39)
BASOPHILS # BLD AUTO: 0.05 X10*3/UL (ref 0–0.1)
BASOPHILS NFR BLD AUTO: 0.9 %
BILIRUB SERPL-MCNC: 0.8 MG/DL (ref 0–1.2)
BUN SERPL-MCNC: 30 MG/DL (ref 6–23)
CALCIUM SERPL-MCNC: 9.2 MG/DL (ref 8.6–10.3)
CHLORIDE SERPL-SCNC: 107 MMOL/L (ref 98–107)
CO2 SERPL-SCNC: 26 MMOL/L (ref 21–32)
CREAT SERPL-MCNC: 1.08 MG/DL (ref 0.5–1.05)
EGFRCR SERPLBLD CKD-EPI 2021: 54 ML/MIN/1.73M*2
EOSINOPHIL # BLD AUTO: 0.15 X10*3/UL (ref 0–0.4)
EOSINOPHIL NFR BLD AUTO: 2.7 %
ERYTHROCYTE [DISTWIDTH] IN BLOOD BY AUTOMATED COUNT: 13.7 % (ref 11.5–14.5)
GLUCOSE SERPL-MCNC: 97 MG/DL (ref 74–99)
HCT VFR BLD AUTO: 35 % (ref 36–46)
HGB BLD-MCNC: 11.5 G/DL (ref 12–16)
IMM GRANULOCYTES # BLD AUTO: 0.02 X10*3/UL (ref 0–0.5)
IMM GRANULOCYTES NFR BLD AUTO: 0.4 % (ref 0–0.9)
LYMPHOCYTES # BLD AUTO: 2.2 X10*3/UL (ref 0.8–3)
LYMPHOCYTES NFR BLD AUTO: 39.6 %
MCH RBC QN AUTO: 28.8 PG (ref 26–34)
MCHC RBC AUTO-ENTMCNC: 32.9 G/DL (ref 32–36)
MCV RBC AUTO: 88 FL (ref 80–100)
MONOCYTES # BLD AUTO: 0.54 X10*3/UL (ref 0.05–0.8)
MONOCYTES NFR BLD AUTO: 9.7 %
NEUTROPHILS # BLD AUTO: 2.6 X10*3/UL (ref 1.6–5.5)
NEUTROPHILS NFR BLD AUTO: 46.7 %
NRBC BLD-RTO: 0 /100 WBCS (ref 0–0)
PLATELET # BLD AUTO: 249 X10*3/UL (ref 150–450)
POTASSIUM SERPL-SCNC: 4.4 MMOL/L (ref 3.5–5.3)
PROT SERPL-MCNC: 7.6 G/DL (ref 6.4–8.2)
RBC # BLD AUTO: 4 X10*6/UL (ref 4–5.2)
SODIUM SERPL-SCNC: 141 MMOL/L (ref 136–145)
TSH SERPL-ACNC: 2 MIU/L (ref 0.44–3.98)
WBC # BLD AUTO: 5.6 X10*3/UL (ref 4.4–11.3)

## 2024-09-05 PROCEDURE — 80053 COMPREHEN METABOLIC PANEL: CPT

## 2024-09-05 PROCEDURE — 84443 ASSAY THYROID STIM HORMONE: CPT

## 2024-09-05 PROCEDURE — 85025 COMPLETE CBC W/AUTO DIFF WBC: CPT

## 2024-09-05 PROCEDURE — 36415 COLL VENOUS BLD VENIPUNCTURE: CPT

## 2024-09-10 ENCOUNTER — HOSPITAL ENCOUNTER (OUTPATIENT)
Dept: CARDIOLOGY | Facility: CLINIC | Age: 74
Discharge: HOME | End: 2024-09-10
Payer: COMMERCIAL

## 2024-09-10 DIAGNOSIS — Q21.12 PFO (PATENT FORAMEN OVALE) (HHS-HCC): ICD-10-CM

## 2024-09-10 DIAGNOSIS — Z87.74 S/P PATENT FORAMEN OVALE CLOSURE: ICD-10-CM

## 2024-09-10 LAB
AORTIC VALVE MEAN GRADIENT: 11 MMHG
AORTIC VALVE PEAK VELOCITY: 2.39 M/S
AV PEAK GRADIENT: 22.8 MMHG
AVA (PEAK VEL): 1.5 CM2
AVA (VTI): 1.38 CM2
EJECTION FRACTION APICAL 4 CHAMBER: 63.6
EJECTION FRACTION: 64 %
LEFT VENTRICLE INTERNAL DIMENSION DIASTOLE: 4.5 CM (ref 3.5–6)
LEFT VENTRICULAR OUTFLOW TRACT DIAMETER: 1.7 CM
LV EJECTION FRACTION BIPLANE: 64 %
MITRAL VALVE E/A RATIO: 0.84
MITRAL VALVE E/E' RATIO: 7.5
RIGHT VENTRICLE PEAK SYSTOLIC PRESSURE: 17.9 MMHG

## 2024-09-10 PROCEDURE — 93306 TTE W/DOPPLER COMPLETE: CPT

## 2024-09-10 PROCEDURE — 93306 TTE W/DOPPLER COMPLETE: CPT | Performed by: INTERNAL MEDICINE

## 2024-09-12 DIAGNOSIS — K21.9 GERD WITHOUT ESOPHAGITIS: ICD-10-CM

## 2024-09-12 RX ORDER — PANTOPRAZOLE SODIUM 40 MG/1
40 TABLET, DELAYED RELEASE ORAL 2 TIMES DAILY
Qty: 180 TABLET | Refills: 1 | Status: SHIPPED | OUTPATIENT
Start: 2024-09-12

## 2024-09-17 ENCOUNTER — APPOINTMENT (OUTPATIENT)
Dept: CARDIOLOGY | Facility: CLINIC | Age: 74
End: 2024-09-17
Payer: COMMERCIAL

## 2024-09-17 VITALS
SYSTOLIC BLOOD PRESSURE: 162 MMHG | HEART RATE: 68 BPM | BODY MASS INDEX: 24.77 KG/M2 | DIASTOLIC BLOOD PRESSURE: 84 MMHG | HEIGHT: 60 IN | WEIGHT: 126.2 LBS

## 2024-09-17 DIAGNOSIS — G45.9 TIA (TRANSIENT ISCHEMIC ATTACK): ICD-10-CM

## 2024-09-17 DIAGNOSIS — Z78.9 NEVER SMOKED TOBACCO: ICD-10-CM

## 2024-09-17 DIAGNOSIS — N18.31 STAGE 3A CHRONIC KIDNEY DISEASE (MULTI): ICD-10-CM

## 2024-09-17 DIAGNOSIS — I10 PRIMARY HYPERTENSION: ICD-10-CM

## 2024-09-17 DIAGNOSIS — Q21.12 PFO (PATENT FORAMEN OVALE) (HHS-HCC): ICD-10-CM

## 2024-09-17 DIAGNOSIS — E78.2 MIXED HYPERLIPIDEMIA: ICD-10-CM

## 2024-09-17 PROCEDURE — 1159F MED LIST DOCD IN RCRD: CPT | Performed by: INTERNAL MEDICINE

## 2024-09-17 PROCEDURE — 1036F TOBACCO NON-USER: CPT | Performed by: INTERNAL MEDICINE

## 2024-09-17 PROCEDURE — 3079F DIAST BP 80-89 MM HG: CPT | Performed by: INTERNAL MEDICINE

## 2024-09-17 PROCEDURE — 3077F SYST BP >= 140 MM HG: CPT | Performed by: INTERNAL MEDICINE

## 2024-09-17 PROCEDURE — 99214 OFFICE O/P EST MOD 30 MIN: CPT | Performed by: INTERNAL MEDICINE

## 2024-09-17 RX ORDER — CLOPIDOGREL BISULFATE 75 MG/1
75 TABLET ORAL DAILY
Qty: 90 TABLET | Refills: 3 | Status: SHIPPED | OUTPATIENT
Start: 2024-09-17 | End: 2025-09-17

## 2024-09-17 NOTE — PROGRESS NOTES
Referred by Dr. Christy ref. provider found provider found for   Chief Complaint   Patient presents with    Follow-up     Patient is present to review echo done 9/10. Patient states that she has a present cough that comes and go's         History of Present Illness  Moon Martinez is a 74 y.o. year old female patient post PFO closure.  Echo showed adequately positioned device.  There was no shunt.  However she did have Valsalva induced outflow tract gradient about 40 mm.  No gradient at rest.  Results were discussed with the patient she is asymptomatic at this point she will continue current medication will call for any problem.  Apparently she did not take her medication this morning therefore blood pressure was elevated.  I told her to take the medication specifically the beta-blockers.  She will follow-up as scheduled    Past Medical History  Past Medical History:   Diagnosis Date    Hyperlipidemia     Hypertension     Personal history of other diseases of the circulatory system     History of hypertension    Personal history of other endocrine, nutritional and metabolic disease     History of hypercholesterolemia    TIA (transient ischemic attack)        Social History  Social History     Tobacco Use    Smoking status: Never    Smokeless tobacco: Never   Vaping Use    Vaping status: Never Used   Substance Use Topics    Alcohol use: Never    Drug use: Never       Family History     Family History   Problem Relation Name Age of Onset    Brain Aneurysm Mother      COPD Sister         Review of Systems  As per HPI, all other systems reviewed and negative.    Allergies:  Allergies   Allergen Reactions    Hydralazine Anaphylaxis    Hydrochlorothiazide Other    Lisinopril GI Upset and Other     Other reaction(s): GI Upset, GI Upset    Morphine Hives        Outpatient Medications:  Current Outpatient Medications   Medication Instructions    amLODIPine (NORVASC) 10 mg, oral, Daily    aspirin 81 mg, oral, Daily    atenolol  (TENORMIN) 50 mg, oral, Daily    atorvastatin (LIPITOR) 80 mg, oral, Nightly    cholecalciferol (VITAMIN D-3) 50,000 Units, oral, Once Weekly, Sunday     clopidogrel (PLAVIX) 75 mg, oral, Daily    diclofenac sodium (VOLTAREN) 4 g, Topical, 4 times daily PRN    losartan-hydrochlorothiazide (Hyzaar) 100-25 mg tablet 1 tablet, oral, Daily    pantoprazole (PROTONIX) 40 mg, oral, Daily before breakfast, Do not crush, chew, or split.         Vitals:  Vitals:    09/17/24 1243   BP: 162/84   Pulse: 68       Physical Exam:  Physical Exam  Vitals and nursing note reviewed.   Constitutional:       Appearance: Normal appearance. She is normal weight.   HENT:      Head: Normocephalic and atraumatic.   Eyes:      Extraocular Movements: Extraocular movements intact.      Pupils: Pupils are equal, round, and reactive to light.   Cardiovascular:      Rate and Rhythm: Normal rate and regular rhythm.      Pulses: Normal pulses.   Pulmonary:      Effort: Pulmonary effort is normal.      Breath sounds: Normal breath sounds.   Musculoskeletal:      Cervical back: Normal range of motion.      Right lower leg: No edema.      Left lower leg: No edema.   Skin:     General: Skin is warm and dry.   Neurological:      General: No focal deficit present.      Mental Status: She is alert and oriented to person, place, and time.             Assessment/Plan   Diagnoses and all orders for this visit:  Primary hypertension  Mixed hyperlipidemia  PFO (patent foramen ovale) ([facility])  BMI 24.0-24.9, adult  Stage 3a chronic kidney disease (Multi)  TIA (transient ischemic attack)  Never smoked tobacco          Umer Mejía MD [facility]  Interventional Cardiology   of [facility]     Thank you for allowing me to participate in the care of this patient. Please do not hesitate to contact me with any further questions or concerns.

## 2024-09-17 NOTE — PATIENT INSTRUCTIONS
Patient to follow up in 6 months with Dr. Umer Mejía MD [facility]     No other changes today.   Continue same medications and treatments.   Patient educated on proper medication use.   Patient educated on risk factor modification.   Please bring any lab results from other providers / physicians to your next appointment.     Please bring all medicines, vitamins, and herbal supplements with you when you come to the office.     Prescriptions will not be filled unless you are compliant with your follow up appointments or have a follow up appointment scheduled as per instruction of your physician. Refills should be requested at the time of your visit.    Stanley FRANCO RN am scribing for and in the presence of Dr. Umer Mejía MD [facility]

## 2024-10-03 ENCOUNTER — APPOINTMENT (OUTPATIENT)
Dept: PRIMARY CARE | Facility: CLINIC | Age: 74
End: 2024-10-03
Payer: COMMERCIAL

## 2024-10-03 ENCOUNTER — LAB (OUTPATIENT)
Dept: LAB | Facility: LAB | Age: 74
End: 2024-10-03
Payer: COMMERCIAL

## 2024-10-03 VITALS
HEIGHT: 60 IN | WEIGHT: 127 LBS | SYSTOLIC BLOOD PRESSURE: 118 MMHG | DIASTOLIC BLOOD PRESSURE: 74 MMHG | TEMPERATURE: 97.9 F | BODY MASS INDEX: 24.94 KG/M2 | HEART RATE: 68 BPM

## 2024-10-03 DIAGNOSIS — N18.31 STAGE 3A CHRONIC KIDNEY DISEASE (MULTI): ICD-10-CM

## 2024-10-03 DIAGNOSIS — Z00.00 MEDICARE ANNUAL WELLNESS VISIT, SUBSEQUENT: ICD-10-CM

## 2024-10-03 DIAGNOSIS — M19.90 ARTHRITIS: ICD-10-CM

## 2024-10-03 DIAGNOSIS — Z00.00 ROUTINE GENERAL MEDICAL EXAMINATION AT HEALTH CARE FACILITY: Primary | ICD-10-CM

## 2024-10-03 DIAGNOSIS — D64.9 ANEMIA, UNSPECIFIED TYPE: ICD-10-CM

## 2024-10-03 DIAGNOSIS — G45.9 TIA (TRANSIENT ISCHEMIC ATTACK): ICD-10-CM

## 2024-10-03 LAB
ALBUMIN SERPL BCP-MCNC: 4.6 G/DL (ref 3.4–5)
ANION GAP SERPL CALC-SCNC: 13 MMOL/L (ref 10–20)
BUN SERPL-MCNC: 28 MG/DL (ref 6–23)
CALCIUM SERPL-MCNC: 9.8 MG/DL (ref 8.6–10.3)
CHLORIDE SERPL-SCNC: 105 MMOL/L (ref 98–107)
CO2 SERPL-SCNC: 29 MMOL/L (ref 21–32)
CREAT SERPL-MCNC: 1.09 MG/DL (ref 0.5–1.05)
EGFRCR SERPLBLD CKD-EPI 2021: 53 ML/MIN/1.73M*2
ERYTHROCYTE [DISTWIDTH] IN BLOOD BY AUTOMATED COUNT: 14.3 % (ref 11.5–14.5)
FERRITIN SERPL-MCNC: 310 NG/ML (ref 8–150)
GLUCOSE SERPL-MCNC: 93 MG/DL (ref 74–99)
HAPTOGLOB SERPL NEPH-MCNC: 229 MG/DL (ref 30–200)
HCT VFR BLD AUTO: 37 % (ref 36–46)
HGB BLD-MCNC: 12 G/DL (ref 12–16)
HGB RETIC QN: 33 PG (ref 28–38)
IMMATURE RETIC FRACTION: 5.3 %
IRON SATN MFR SERPL: 24 % (ref 25–45)
IRON SERPL-MCNC: 76 UG/DL (ref 35–150)
MCH RBC QN AUTO: 28.9 PG (ref 26–34)
MCHC RBC AUTO-ENTMCNC: 32.4 G/DL (ref 32–36)
MCV RBC AUTO: 89 FL (ref 80–100)
NRBC BLD-RTO: 0 /100 WBCS (ref 0–0)
PHOSPHATE SERPL-MCNC: 4.5 MG/DL (ref 2.5–4.9)
PLATELET # BLD AUTO: 299 X10*3/UL (ref 150–450)
POTASSIUM SERPL-SCNC: 4.6 MMOL/L (ref 3.5–5.3)
RBC # BLD AUTO: 4.15 X10*6/UL (ref 4–5.2)
RETICS #: 0.07 X10*6/UL (ref 0.02–0.11)
RETICS/RBC NFR AUTO: 1.7 % (ref 0.5–2)
SODIUM SERPL-SCNC: 142 MMOL/L (ref 136–145)
TIBC SERPL-MCNC: 323 UG/DL (ref 240–445)
UIBC SERPL-MCNC: 247 UG/DL (ref 110–370)
VIT B12 SERPL-MCNC: 276 PG/ML (ref 211–911)
WBC # BLD AUTO: 6.6 X10*3/UL (ref 4.4–11.3)

## 2024-10-03 PROCEDURE — 83550 IRON BINDING TEST: CPT

## 2024-10-03 PROCEDURE — 1158F ADVNC CARE PLAN TLK DOCD: CPT | Performed by: INTERNAL MEDICINE

## 2024-10-03 PROCEDURE — 1036F TOBACCO NON-USER: CPT | Performed by: INTERNAL MEDICINE

## 2024-10-03 PROCEDURE — 82607 VITAMIN B-12: CPT

## 2024-10-03 PROCEDURE — 36415 COLL VENOUS BLD VENIPUNCTURE: CPT

## 2024-10-03 PROCEDURE — 1123F ACP DISCUSS/DSCN MKR DOCD: CPT | Performed by: INTERNAL MEDICINE

## 2024-10-03 PROCEDURE — 3074F SYST BP LT 130 MM HG: CPT | Performed by: INTERNAL MEDICINE

## 2024-10-03 PROCEDURE — G0439 PPPS, SUBSEQ VISIT: HCPCS | Performed by: INTERNAL MEDICINE

## 2024-10-03 PROCEDURE — 82728 ASSAY OF FERRITIN: CPT

## 2024-10-03 PROCEDURE — 85045 AUTOMATED RETICULOCYTE COUNT: CPT

## 2024-10-03 PROCEDURE — 80069 RENAL FUNCTION PANEL: CPT

## 2024-10-03 PROCEDURE — 1159F MED LIST DOCD IN RCRD: CPT | Performed by: INTERNAL MEDICINE

## 2024-10-03 PROCEDURE — 83010 ASSAY OF HAPTOGLOBIN QUANT: CPT

## 2024-10-03 PROCEDURE — 3078F DIAST BP <80 MM HG: CPT | Performed by: INTERNAL MEDICINE

## 2024-10-03 PROCEDURE — 1160F RVW MEDS BY RX/DR IN RCRD: CPT | Performed by: INTERNAL MEDICINE

## 2024-10-03 PROCEDURE — 83540 ASSAY OF IRON: CPT

## 2024-10-03 PROCEDURE — 85027 COMPLETE CBC AUTOMATED: CPT

## 2024-10-03 PROCEDURE — 99397 PER PM REEVAL EST PAT 65+ YR: CPT | Performed by: INTERNAL MEDICINE

## 2024-10-03 RX ORDER — CLOPIDOGREL BISULFATE 75 MG/1
75 TABLET ORAL DAILY
Qty: 30 TABLET | Refills: 5 | Status: SHIPPED | OUTPATIENT
Start: 2024-10-03 | End: 2025-04-01

## 2024-10-03 RX ORDER — DICLOFENAC SODIUM 10 MG/G
4 GEL TOPICAL 4 TIMES DAILY PRN
Qty: 100 G | Refills: 2 | Status: SHIPPED | OUTPATIENT
Start: 2024-10-03

## 2024-10-03 ASSESSMENT — PATIENT HEALTH QUESTIONNAIRE - PHQ9
SUM OF ALL RESPONSES TO PHQ9 QUESTIONS 1 & 2: 0
1. LITTLE INTEREST OR PLEASURE IN DOING THINGS: NOT AT ALL
2. FEELING DOWN, DEPRESSED OR HOPELESS: NOT AT ALL

## 2024-10-03 ASSESSMENT — ENCOUNTER SYMPTOMS
NEUROLOGICAL NEGATIVE: 1
CONSTITUTIONAL NEGATIVE: 1
EYES NEGATIVE: 1
HEMATOLOGIC/LYMPHATIC NEGATIVE: 1
CARDIOVASCULAR NEGATIVE: 1
RESPIRATORY NEGATIVE: 1
PSYCHIATRIC NEGATIVE: 1

## 2024-10-03 NOTE — PROGRESS NOTES
Subjective   Reason for Visit: Moon Martinez is an 74 y.o. female here for a Medicare Wellness visit.          Reviewed all medications by prescribing practitioner or clinical pharmacist (such as prescriptions, OTCs, herbal therapies and supplements) and documented in the medical record.    HPI patient here for office visit and Medicare wellness.  She has a history of 2 TIAs couple months ago also history of large PFO which was closed by Dr. Mejía she is doing well she is on DAPT.    Patient Care Team:  Claire Watson MD as PCP - General (Internal Medicine)  Liliana Miller LPN as Care Manager (Case Management)  Umer Mejía MD as Cardiologist (Cardiology)     Review of Systems   Constitutional: Negative.    HENT: Negative.     Eyes: Negative.    Respiratory: Negative.     Cardiovascular: Negative.    Genitourinary: Negative.    Neurological: Negative.    Hematological: Negative.    Psychiatric/Behavioral: Negative.     All other systems reviewed and are negative.      Objective   Vitals:  /74   Pulse 68   Temp 36.6 °C (97.9 °F) (Temporal)   Ht 1.524 m (5')   Wt 57.6 kg (127 lb)   BMI 24.80 kg/m²       Physical Exam  Vitals reviewed.   Constitutional:       Appearance: Normal appearance.   HENT:      Head: Normocephalic.   Cardiovascular:      Rate and Rhythm: Normal rate and regular rhythm.      Pulses: Normal pulses.      Heart sounds: Normal heart sounds.   Pulmonary:      Effort: Pulmonary effort is normal.      Breath sounds: Normal breath sounds.   Musculoskeletal:      Right lower leg: No edema.      Left lower leg: No edema.   Neurological:      Mental Status: She is alert.   Psychiatric:         Mood and Affect: Mood normal.         Behavior: Behavior normal.         Thought Content: Thought content normal.       Assessment & Plan  TIA (transient ischemic attack)    Orders:    clopidogrel (Plavix) 75 mg tablet; Take 1 tablet (75 mg) by mouth once daily.    Arthritis    Orders:     diclofenac sodium (Voltaren Arthritis Pain) 1 % gel; Apply 4.5 inches (4 g) topically 4 times a day as needed (apply 4.5 inches (4g) topically QID as needed).    Routine general medical examination at health care facility Medicare annual wellness visit, subsequent         Anemia, unspecified type    Orders:    CBC; Future    Reticulocytes; Future    Iron and TIBC; Future    Haptoglobin; Future    Ferritin; Future    Vitamin B12; Future    Stage 3a chronic kidney disease (Multi)    Orders:    Renal function panel; Future    CBC renal function test will be checked anemia workup will be done she never had colonoscopy but she may need EGD and colonoscopy if she is low in iron continue losartan hydrochlorothiazide and amlodipine for hypertension continue statins and DAPT with history of 2- TIAs

## 2024-10-04 ENCOUNTER — PATIENT OUTREACH (OUTPATIENT)
Dept: PRIMARY CARE | Facility: CLINIC | Age: 74
End: 2024-10-04
Payer: COMMERCIAL

## 2024-10-04 NOTE — PROGRESS NOTES
If unable to contact patient:  Final call back to assess needs post discharge. Left voicemail for patient. Contact information provided.

## 2024-10-09 DIAGNOSIS — Z00.00 HEALTHCARE MAINTENANCE: ICD-10-CM

## 2024-10-09 RX ORDER — ASPIRIN 81 MG/1
81 TABLET ORAL DAILY
Qty: 90 TABLET | Refills: 1 | Status: SHIPPED | OUTPATIENT
Start: 2024-10-09

## 2024-10-13 DIAGNOSIS — K21.9 GERD WITHOUT ESOPHAGITIS: ICD-10-CM

## 2024-10-14 RX ORDER — PANTOPRAZOLE SODIUM 40 MG/1
TABLET, DELAYED RELEASE ORAL
Qty: 90 TABLET | Refills: 1 | Status: SHIPPED | OUTPATIENT
Start: 2024-10-14

## 2024-12-02 ENCOUNTER — TELEPHONE (OUTPATIENT)
Dept: PRIMARY CARE | Facility: CLINIC | Age: 74
End: 2024-12-02
Payer: COMMERCIAL

## 2025-01-10 ENCOUNTER — APPOINTMENT (OUTPATIENT)
Dept: PRIMARY CARE | Facility: CLINIC | Age: 75
End: 2025-01-10
Payer: COMMERCIAL

## 2025-01-22 DIAGNOSIS — Z00.00 HEALTHCARE MAINTENANCE: ICD-10-CM

## 2025-01-22 DIAGNOSIS — K21.9 GERD WITHOUT ESOPHAGITIS: ICD-10-CM

## 2025-01-22 DIAGNOSIS — G45.9 TIA (TRANSIENT ISCHEMIC ATTACK): ICD-10-CM

## 2025-01-22 DIAGNOSIS — R29.90 STROKE-LIKE SYMPTOMS: ICD-10-CM

## 2025-01-22 RX ORDER — LOSARTAN POTASSIUM AND HYDROCHLOROTHIAZIDE 25; 100 MG/1; MG/1
1 TABLET ORAL DAILY
Qty: 90 TABLET | Refills: 1 | Status: SHIPPED | OUTPATIENT
Start: 2025-01-22

## 2025-01-22 RX ORDER — AMLODIPINE BESYLATE 10 MG/1
10 TABLET ORAL DAILY
Qty: 90 TABLET | Refills: 1 | Status: SHIPPED | OUTPATIENT
Start: 2025-01-22

## 2025-01-22 RX ORDER — ATORVASTATIN CALCIUM 80 MG/1
80 TABLET, FILM COATED ORAL NIGHTLY
Qty: 90 TABLET | Refills: 1 | Status: SHIPPED | OUTPATIENT
Start: 2025-01-22 | End: 2025-07-21

## 2025-01-22 RX ORDER — ASPIRIN 81 MG/1
81 TABLET ORAL DAILY
Qty: 90 TABLET | Refills: 1 | Status: SHIPPED | OUTPATIENT
Start: 2025-01-22

## 2025-01-22 RX ORDER — PANTOPRAZOLE SODIUM 40 MG/1
40 TABLET, DELAYED RELEASE ORAL
Qty: 90 TABLET | Refills: 1 | Status: SHIPPED | OUTPATIENT
Start: 2025-01-22

## 2025-01-22 RX ORDER — CLOPIDOGREL BISULFATE 75 MG/1
75 TABLET ORAL DAILY
Qty: 30 TABLET | Refills: 5 | Status: SHIPPED | OUTPATIENT
Start: 2025-01-22 | End: 2025-07-21

## 2025-02-18 ENCOUNTER — TELEPHONE (OUTPATIENT)
Dept: PRIMARY CARE | Facility: CLINIC | Age: 75
End: 2025-02-18
Payer: COMMERCIAL

## 2025-02-19 ENCOUNTER — APPOINTMENT (OUTPATIENT)
Dept: PRIMARY CARE | Facility: CLINIC | Age: 75
End: 2025-02-19
Payer: COMMERCIAL

## 2025-02-19 VITALS — HEART RATE: 72 BPM | DIASTOLIC BLOOD PRESSURE: 76 MMHG | RESPIRATION RATE: 16 BRPM | SYSTOLIC BLOOD PRESSURE: 132 MMHG

## 2025-02-19 DIAGNOSIS — M25.511 PAIN OF BOTH SHOULDER JOINTS: Primary | ICD-10-CM

## 2025-02-19 DIAGNOSIS — N18.30 STAGE 3 CHRONIC KIDNEY DISEASE, UNSPECIFIED WHETHER STAGE 3A OR 3B CKD (MULTI): ICD-10-CM

## 2025-02-19 DIAGNOSIS — R53.83 FATIGUE, UNSPECIFIED TYPE: ICD-10-CM

## 2025-02-19 DIAGNOSIS — E03.9 HYPOTHYROIDISM, UNSPECIFIED TYPE: ICD-10-CM

## 2025-02-19 DIAGNOSIS — M25.512 PAIN OF BOTH SHOULDER JOINTS: Primary | ICD-10-CM

## 2025-02-19 DIAGNOSIS — R29.90 STROKE-LIKE SYMPTOMS: ICD-10-CM

## 2025-02-19 PROCEDURE — 1123F ACP DISCUSS/DSCN MKR DOCD: CPT | Performed by: INTERNAL MEDICINE

## 2025-02-19 PROCEDURE — 1036F TOBACCO NON-USER: CPT | Performed by: INTERNAL MEDICINE

## 2025-02-19 PROCEDURE — 99214 OFFICE O/P EST MOD 30 MIN: CPT | Performed by: INTERNAL MEDICINE

## 2025-02-19 PROCEDURE — 3075F SYST BP GE 130 - 139MM HG: CPT | Performed by: INTERNAL MEDICINE

## 2025-02-19 PROCEDURE — 1158F ADVNC CARE PLAN TLK DOCD: CPT | Performed by: INTERNAL MEDICINE

## 2025-02-19 PROCEDURE — 1159F MED LIST DOCD IN RCRD: CPT | Performed by: INTERNAL MEDICINE

## 2025-02-19 PROCEDURE — 3078F DIAST BP <80 MM HG: CPT | Performed by: INTERNAL MEDICINE

## 2025-02-19 ASSESSMENT — ENCOUNTER SYMPTOMS
EYE ITCHING: 0
CARDIOVASCULAR NEGATIVE: 1
CONSTITUTIONAL NEGATIVE: 1
RESPIRATORY NEGATIVE: 1
EYE DISCHARGE: 0

## 2025-02-19 ASSESSMENT — PATIENT HEALTH QUESTIONNAIRE - PHQ9
1. LITTLE INTEREST OR PLEASURE IN DOING THINGS: NOT AT ALL
SUM OF ALL RESPONSES TO PHQ9 QUESTIONS 1 & 2: 0
2. FEELING DOWN, DEPRESSED OR HOPELESS: NOT AT ALL

## 2025-02-19 NOTE — PROGRESS NOTES
Subjective   Patient ID: Moon Martinez is a 74 y.o. female who presents for Follow-up (Pt here for follow  up body/ shoulders pain  kimberly extremities tingling, her ears peoples voices sound blurry and a sensation going up side of head, eyes blurry very fatigued slight dizzy and  frequent urination).    HPI patient has been having some plugging of her ears and gets some blurry vision at times on physical examination ears are clean she may need to see ENT she will was also advised to see her ophthalmologist she is sometimes feels tired and gets dizzy.  She does have component of neuropathy also    Review of Systems   Constitutional: Negative.    HENT: Negative.     Eyes:  Negative for discharge and itching.   Respiratory: Negative.     Cardiovascular: Negative.    Genitourinary: Negative.    All other systems reviewed and are negative.      Objective   /76   Pulse 72   Resp 16     Physical Exam  HENT:      Head: Normocephalic and atraumatic.   Cardiovascular:      Rate and Rhythm: Normal rate and regular rhythm.   Pulmonary:      Effort: Pulmonary effort is normal.      Breath sounds: Normal breath sounds.   Musculoskeletal:      Right lower leg: No edema.      Left lower leg: No edema.   Neurological:      General: No focal deficit present.      Mental Status: She is oriented to person, place, and time.         Assessment/Plan   Problem List Items Addressed This Visit       Stroke-like symptoms    Chronic kidney disease    Relevant Orders    Comprehensive Metabolic Panel    Urine Protein Electrophoresis    Albumin-Creatinine Ratio, Urine Random     Other Visit Diagnoses       Pain of both shoulder joints    -  Primary    Relevant Orders    Sedimentation Rate    Fatigue, unspecified type        Relevant Orders    CBC and Auto Differential    Hypothyroidism, unspecified type        Relevant Orders    TSH with reflex to Free T4 if abnormal           Blood work ordered including a sed rate due to pain in both  shoulders.  Also will check TSH because of fatigue.  She will continue losartan hydrochlorothiazide for hypertension.  Patient does not have strokelike symptoms.  After the blood work will determine the best course of action.  We need to rule out hypothyroidism we need to rule out peripheral neuropathy and polymyalgia rheumatica.  She is on DAPT because of surgery for patent foramen ovale

## 2025-03-06 ENCOUNTER — LAB (OUTPATIENT)
Dept: LAB | Facility: HOSPITAL | Age: 75
End: 2025-03-06
Payer: COMMERCIAL

## 2025-03-06 PROCEDURE — 84166 PROTEIN E-PHORESIS/URINE/CSF: CPT

## 2025-03-06 PROCEDURE — 84156 ASSAY OF PROTEIN URINE: CPT

## 2025-03-07 LAB
ALBUMIN/CREAT UR: 8 MG/G CREAT
BASOPHILS # BLD AUTO: 60 CELLS/UL (ref 0–200)
BASOPHILS NFR BLD AUTO: 0.9 %
CREAT UR-MCNC: 284 MG/DL (ref 20–275)
EOSINOPHIL # BLD AUTO: 114 CELLS/UL (ref 15–500)
EOSINOPHIL NFR BLD AUTO: 1.7 %
ERYTHROCYTE [DISTWIDTH] IN BLOOD BY AUTOMATED COUNT: 13.5 % (ref 11–15)
ERYTHROCYTE [SEDIMENTATION RATE] IN BLOOD BY WESTERGREN METHOD: 19 MM/H
HCT VFR BLD AUTO: 36.2 % (ref 35–45)
HGB BLD-MCNC: 11.8 G/DL (ref 11.7–15.5)
LYMPHOCYTES # BLD AUTO: 2660 CELLS/UL (ref 850–3900)
LYMPHOCYTES NFR BLD AUTO: 39.7 %
MCH RBC QN AUTO: 28.9 PG (ref 27–33)
MCHC RBC AUTO-ENTMCNC: 32.6 G/DL (ref 32–36)
MCV RBC AUTO: 88.5 FL (ref 80–100)
MICROALBUMIN UR-MCNC: 2.4 MG/DL
MONOCYTES # BLD AUTO: 838 CELLS/UL (ref 200–950)
MONOCYTES NFR BLD AUTO: 12.5 %
NEUTROPHILS # BLD AUTO: 3028 CELLS/UL (ref 1500–7800)
NEUTROPHILS NFR BLD AUTO: 45.2 %
PLATELET # BLD AUTO: 276 THOUSAND/UL (ref 140–400)
PMV BLD REES-ECKER: 9.4 FL (ref 7.5–12.5)
PROT UR-ACNC: 17 MG/DL (ref 5–25)
RBC # BLD AUTO: 4.09 MILLION/UL (ref 3.8–5.1)
TSH SERPL-ACNC: 3.38 MIU/L (ref 0.4–4.5)
WBC # BLD AUTO: 6.7 THOUSAND/UL (ref 3.8–10.8)

## 2025-03-08 LAB
ALBUMIN MFR UR ELPH: 41.3 %
ALPHA1 GLOB MFR UR ELPH: 8.8 %
ALPHA2 GLOB MFR UR ELPH: 16.2 %
B-GLOBULIN MFR UR ELPH: 19.6 %
GAMMA GLOB MFR UR ELPH: 14.1 %
PATH REVIEW-URINE PROTEIN ELECTROPHORESIS: NORMAL
URINE ELECTROPHORESIS COMMENT: NORMAL

## 2025-03-17 DIAGNOSIS — R00.1 BRADYCARDIA: ICD-10-CM

## 2025-03-17 DIAGNOSIS — I10 HTN (HYPERTENSION): ICD-10-CM

## 2025-03-17 DIAGNOSIS — E55.9 VITAMIN D DEFICIENCY: Primary | ICD-10-CM

## 2025-03-17 RX ORDER — ASPIRIN 325 MG
50000 TABLET, DELAYED RELEASE (ENTERIC COATED) ORAL
Qty: 30 CAPSULE | Refills: 0 | Status: SHIPPED | OUTPATIENT
Start: 2025-03-17

## 2025-03-17 RX ORDER — ATENOLOL 100 MG/1
50 TABLET ORAL DAILY
Qty: 90 TABLET | Refills: 0 | Status: SHIPPED | OUTPATIENT
Start: 2025-03-17

## 2025-03-18 ENCOUNTER — APPOINTMENT (OUTPATIENT)
Dept: CARDIOLOGY | Facility: CLINIC | Age: 75
End: 2025-03-18
Payer: COMMERCIAL

## 2025-03-18 VITALS
HEIGHT: 58 IN | WEIGHT: 129.2 LBS | BODY MASS INDEX: 27.12 KG/M2 | SYSTOLIC BLOOD PRESSURE: 122 MMHG | DIASTOLIC BLOOD PRESSURE: 66 MMHG | HEART RATE: 56 BPM

## 2025-03-18 DIAGNOSIS — Z87.74 S/P PATENT FORAMEN OVALE CLOSURE: ICD-10-CM

## 2025-03-18 DIAGNOSIS — Q21.12 PFO (PATENT FORAMEN OVALE) (HHS-HCC): ICD-10-CM

## 2025-03-18 DIAGNOSIS — Z78.9 NEVER SMOKED TOBACCO: ICD-10-CM

## 2025-03-18 DIAGNOSIS — N18.31 STAGE 3A CHRONIC KIDNEY DISEASE (MULTI): ICD-10-CM

## 2025-03-18 DIAGNOSIS — G45.9 TIA (TRANSIENT ISCHEMIC ATTACK): ICD-10-CM

## 2025-03-18 DIAGNOSIS — I10 PRIMARY HYPERTENSION: ICD-10-CM

## 2025-03-18 DIAGNOSIS — E78.2 MIXED HYPERLIPIDEMIA: ICD-10-CM

## 2025-03-18 PROBLEM — N18.9 CHRONIC KIDNEY DISEASE: Status: RESOLVED | Noted: 2024-07-30 | Resolved: 2025-03-18

## 2025-03-18 PROCEDURE — 1036F TOBACCO NON-USER: CPT | Performed by: INTERNAL MEDICINE

## 2025-03-18 PROCEDURE — 3078F DIAST BP <80 MM HG: CPT | Performed by: INTERNAL MEDICINE

## 2025-03-18 PROCEDURE — 99214 OFFICE O/P EST MOD 30 MIN: CPT | Performed by: INTERNAL MEDICINE

## 2025-03-18 PROCEDURE — 1159F MED LIST DOCD IN RCRD: CPT | Performed by: INTERNAL MEDICINE

## 2025-03-18 PROCEDURE — 3074F SYST BP LT 130 MM HG: CPT | Performed by: INTERNAL MEDICINE

## 2025-03-18 PROCEDURE — 1123F ACP DISCUSS/DSCN MKR DOCD: CPT | Performed by: INTERNAL MEDICINE

## 2025-03-18 NOTE — PROGRESS NOTES
Referred by Dr. Christy ref. provider found provider found for   Chief Complaint   Patient presents with    Follow-up     6 month follow-up on hypertension & hyperlipidemia & management of PFO.          Chief complaint:   Chief Complaint   Patient presents with    Follow-up     6 month follow-up on hypertension & hyperlipidemia & management of PFO.          History of Present Illness  Moon Martinez is a 75 y.o. year old female patient here for follow-up.  Status post previous PFO closure doing well no complaint no symptoms of chest pain or shortness of breath.  Still taking Plavix.  The blood pressure is adequately controlled.  Discussed with the patient that we will go ahead and stop Plavix and continue other medication.  She will continue baby aspirin.  Will call for any problem and follow-up as scheduled    Past Medical History  Past Medical History:   Diagnosis Date    Hyperlipidemia     Hypertension     Personal history of other diseases of the circulatory system     History of hypertension    Personal history of other endocrine, nutritional and metabolic disease     History of hypercholesterolemia    TIA (transient ischemic attack)        Social History  Social History     Tobacco Use    Smoking status: Never    Smokeless tobacco: Never   Vaping Use    Vaping status: Never Used   Substance Use Topics    Alcohol use: Never    Drug use: Never       Family History     Family History   Problem Relation Name Age of Onset    Brain Aneurysm Mother      COPD Sister         Review of Systems  As per HPI, all other systems reviewed and negative.    Allergies:  Allergies   Allergen Reactions    Hydralazine Anaphylaxis    Hydrochlorothiazide Other    Lisinopril GI Upset and Other     Other reaction(s): GI Upset, GI Upset    Morphine Hives        Outpatient Medications:  Current Outpatient Medications   Medication Instructions    amLODIPine (NORVASC) 10 mg, oral, Daily    aspirin 81 mg, oral, Daily    atenolol (TENORMIN) 50 mg,  oral, Daily    atorvastatin (LIPITOR) 80 mg, oral, Nightly    cholecalciferol (VITAMIN D-3) 50,000 Units, oral, Once Weekly, Sunday    diclofenac sodium (VOLTAREN ARTHRITIS PAIN) 4 g, Topical, 4 times daily PRN    losartan-hydrochlorothiazide (Hyzaar) 100-25 mg tablet 1 tablet, oral, Daily    pantoprazole (PROTONIX) 40 mg, oral, Daily before breakfast, Do not crush, chew, or split.         Vitals:  Vitals:    03/18/25 1101   BP: 122/66   Pulse: 56       Physical Exam:  Physical Exam  Vitals and nursing note reviewed.   Constitutional:       Appearance: Normal appearance. She is normal weight.   HENT:      Head: Normocephalic and atraumatic.   Eyes:      Extraocular Movements: Extraocular movements intact.      Pupils: Pupils are equal, round, and reactive to light.   Cardiovascular:      Rate and Rhythm: Normal rate and regular rhythm.      Pulses: Normal pulses.   Pulmonary:      Effort: Pulmonary effort is normal.      Breath sounds: Normal breath sounds.   Musculoskeletal:      Cervical back: Normal range of motion.      Right lower leg: No edema.      Left lower leg: No edema.   Skin:     General: Skin is warm and dry.   Neurological:      General: No focal deficit present.      Mental Status: She is alert and oriented to person, place, and time.             Assessment/Plan   Diagnoses and all orders for this visit:  PFO (patent foramen ovale) ([facility])  S/P patent foramen ovale closure  Mixed hyperlipidemia  Primary hypertension  BMI 27.0-27.9,adult  Stage 3a chronic kidney disease (Multi)  TIA (transient ischemic attack)  Never smoked tobacco          IStanley RN   am scribing for, and in the presence of Dr. Umer Mejía MD [facility] .    I, Dr. Umer Mejía MD [facility] , personally performed the services described in the documentation as scribed by Stanley Torres RN   in my presence, and confirm it is both accurate and complete.      Umer Mejía MD [facility]  Interventional Cardiology  Thank you for allowing me to  participate in the care of this patient. Please do not hesitate to contact me with any further questions or concerns.

## 2025-04-02 ENCOUNTER — TELEPHONE (OUTPATIENT)
Dept: PRIMARY CARE | Facility: CLINIC | Age: 75
End: 2025-04-02
Payer: COMMERCIAL

## 2025-04-03 ENCOUNTER — APPOINTMENT (OUTPATIENT)
Dept: PRIMARY CARE | Facility: CLINIC | Age: 75
End: 2025-04-03
Payer: COMMERCIAL

## 2025-04-03 VITALS
HEART RATE: 60 BPM | TEMPERATURE: 97.8 F | WEIGHT: 130 LBS | SYSTOLIC BLOOD PRESSURE: 116 MMHG | HEIGHT: 58 IN | DIASTOLIC BLOOD PRESSURE: 62 MMHG | BODY MASS INDEX: 27.29 KG/M2

## 2025-04-03 DIAGNOSIS — M54.2 NECK PAIN, BILATERAL: ICD-10-CM

## 2025-04-03 DIAGNOSIS — Z00.00 ROUTINE GENERAL MEDICAL EXAMINATION AT HEALTH CARE FACILITY: Primary | ICD-10-CM

## 2025-04-03 DIAGNOSIS — Z12.11 COLON CANCER SCREENING: ICD-10-CM

## 2025-04-03 DIAGNOSIS — Z12.31 ENCOUNTER FOR SCREENING MAMMOGRAM FOR MALIGNANT NEOPLASM OF BREAST: ICD-10-CM

## 2025-04-03 DIAGNOSIS — L65.9 ALOPECIA: ICD-10-CM

## 2025-04-03 DIAGNOSIS — D64.9 ANEMIA, UNSPECIFIED TYPE: ICD-10-CM

## 2025-04-03 DIAGNOSIS — M79.10 MYALGIA: ICD-10-CM

## 2025-04-03 DIAGNOSIS — E83.10 DISORDER OF IRON METABOLISM, UNSPECIFIED: ICD-10-CM

## 2025-04-03 DIAGNOSIS — R53.83 FATIGUE, UNSPECIFIED TYPE: ICD-10-CM

## 2025-04-03 PROCEDURE — 1123F ACP DISCUSS/DSCN MKR DOCD: CPT | Performed by: INTERNAL MEDICINE

## 2025-04-03 PROCEDURE — 1159F MED LIST DOCD IN RCRD: CPT | Performed by: INTERNAL MEDICINE

## 2025-04-03 PROCEDURE — G0439 PPPS, SUBSEQ VISIT: HCPCS | Performed by: INTERNAL MEDICINE

## 2025-04-03 PROCEDURE — 3078F DIAST BP <80 MM HG: CPT | Performed by: INTERNAL MEDICINE

## 2025-04-03 PROCEDURE — 1036F TOBACCO NON-USER: CPT | Performed by: INTERNAL MEDICINE

## 2025-04-03 PROCEDURE — 99397 PER PM REEVAL EST PAT 65+ YR: CPT | Performed by: INTERNAL MEDICINE

## 2025-04-03 PROCEDURE — 3074F SYST BP LT 130 MM HG: CPT | Performed by: INTERNAL MEDICINE

## 2025-04-03 RX ORDER — CYCLOBENZAPRINE HCL 5 MG
5 TABLET ORAL 3 TIMES DAILY PRN
Qty: 30 TABLET | Refills: 0 | Status: SHIPPED | OUTPATIENT
Start: 2025-04-03 | End: 2025-06-02

## 2025-04-03 RX ORDER — MINOXIDIL 50 MG/G
1 AEROSOL, FOAM TOPICAL DAILY
Qty: 60 G | Refills: 1 | Status: SHIPPED | OUTPATIENT
Start: 2025-04-03

## 2025-04-03 ASSESSMENT — ENCOUNTER SYMPTOMS
DIZZINESS: 0
EYES NEGATIVE: 1
SEIZURES: 0
RESPIRATORY NEGATIVE: 1
GASTROINTESTINAL NEGATIVE: 1
HEADACHES: 0
ENDOCRINE NEGATIVE: 1
MUSCULOSKELETAL NEGATIVE: 1
CONSTITUTIONAL NEGATIVE: 1

## 2025-04-03 NOTE — PROGRESS NOTES
"Subjective   Reason for Visit: Moon Martinez is an 75 y.o. female here for a Medicare Wellness visit.          Reviewed all medications by prescribing practitioner or clinical pharmacist (such as prescriptions, OTCs, herbal therapies and supplements) and documented in the medical record.    Pt here for medicare wellness. She is concerned about hair loss.Labs reviewed.  Patient has history of PFO closure she was on DAPT and now she has been taken off of clopidogrel and now she is only on low-dose aspirin.        Patient Care Team:  Claire Watson MD as PCP - General (Internal Medicine)  Umer Mejía MD as Cardiologist (Cardiology)     Review of Systems   Constitutional: Negative.    HENT: Negative.     Eyes: Negative.    Respiratory: Negative.     Gastrointestinal: Negative.    Endocrine: Negative.    Genitourinary: Negative.    Musculoskeletal: Negative.    Neurological:  Negative for dizziness, seizures, syncope and headaches.        C/o pain in the right leg and right forearm most likely secondary to neuropathy   All other systems reviewed and are negative.      Objective   Vitals:  /62   Pulse 60   Temp 36.6 °C (97.8 °F) (Temporal)   Ht 1.473 m (4' 10\")   Wt 59 kg (130 lb)   BMI 27.17 kg/m²       Physical Exam  Vitals reviewed.   Constitutional:       Appearance: Normal appearance.   HENT:      Head: Normocephalic and atraumatic.   Eyes:      Conjunctiva/sclera: Conjunctivae normal.   Cardiovascular:      Rate and Rhythm: Normal rate and regular rhythm.      Pulses: Normal pulses.      Heart sounds: Normal heart sounds.   Musculoskeletal:      Right lower leg: No edema.      Left lower leg: No edema.   Lymphadenopathy:      Cervical: No cervical adenopathy.   Neurological:      General: No focal deficit present.      Mental Status: She is alert and oriented to person, place, and time.      Cranial Nerves: No cranial nerve deficit.   Psychiatric:         Mood and Affect: Mood normal. "         Assessment & Plan  Routine general medical examination at health care facility         Anemia, unspecified type    Orders:    Ferritin; Future    Neck pain, bilateral    Orders:    Iron and TIBC; Future    cyclobenzaprine (Flexeril) 5 mg tablet; Take 1 tablet (5 mg) by mouth 3 times a day as needed for muscle spasms.    Fatigue, unspecified type    Orders:    TSH with reflex to Free T4 if abnormal; Future    Myalgia    Orders:    TSH with reflex to Free T4 if abnormal; Future    CK; Future    Alopecia    Orders:    minoxidiL (Rogaine) 5 % foam; Apply 1 Application (50 mg) topically once daily.    Disorder of iron metabolism, unspecified    Orders:    Iron and TIBC; Future    Colon cancer screening    Orders:    Cologuard® colon cancer screening; Future    Encounter for screening mammogram for malignant neoplasm of breast    Orders:    BI mammo bilateral screening tomosynthesis; Future  Patient will be given blood work for alopecia including iron TSH ferritin.  Regarding colon cancer screening she never had colonoscopy she does not want to get colonoscopy she is agreeable to getting Cologuard.  Patient has history of stroke and is status post PFO closure now is on aspirin and statin.  Follow-up blood pressure she is on losartan hydrochlorothiazide atenolol amlodipine.  She was given minoxidil foam prescription for alopecia.  She takes pantoprazole for acid reflux.

## 2025-04-13 LAB — NONINV COLON CA DNA+OCC BLD SCRN STL QL: NEGATIVE

## 2025-04-29 ENCOUNTER — HOSPITAL ENCOUNTER (OUTPATIENT)
Dept: RADIOLOGY | Facility: HOSPITAL | Age: 75
Discharge: HOME | End: 2025-04-29
Payer: COMMERCIAL

## 2025-04-29 DIAGNOSIS — Z12.31 ENCOUNTER FOR SCREENING MAMMOGRAM FOR MALIGNANT NEOPLASM OF BREAST: ICD-10-CM

## 2025-04-29 PROCEDURE — 77067 SCR MAMMO BI INCL CAD: CPT | Performed by: RADIOLOGY

## 2025-04-29 PROCEDURE — 77063 BREAST TOMOSYNTHESIS BI: CPT | Performed by: RADIOLOGY

## 2025-04-29 PROCEDURE — 77067 SCR MAMMO BI INCL CAD: CPT

## 2025-04-30 ENCOUNTER — HOSPITAL ENCOUNTER (OUTPATIENT)
Dept: RADIOLOGY | Facility: EXTERNAL LOCATION | Age: 75
Discharge: HOME | End: 2025-04-30

## 2025-04-30 DIAGNOSIS — Z12.31 ENCOUNTER FOR SCREENING MAMMOGRAM FOR MALIGNANT NEOPLASM OF BREAST: ICD-10-CM

## 2025-04-30 LAB
CK SERPL-CCNC: 131 U/L (ref 18–225)
FERRITIN SERPL-MCNC: 185 NG/ML (ref 16–288)
IRON SATN MFR SERPL: 25 % (CALC) (ref 16–45)
IRON SERPL-MCNC: 80 MCG/DL (ref 45–160)
TIBC SERPL-MCNC: 323 MCG/DL (CALC) (ref 250–450)
TSH SERPL-ACNC: 2.79 MIU/L (ref 0.4–4.5)

## 2025-05-19 DIAGNOSIS — N18.30 STAGE 3 CHRONIC KIDNEY DISEASE, UNSPECIFIED WHETHER STAGE 3A OR 3B CKD (MULTI): ICD-10-CM

## 2025-08-05 ENCOUNTER — APPOINTMENT (OUTPATIENT)
Dept: PRIMARY CARE | Facility: CLINIC | Age: 75
End: 2025-08-05
Payer: COMMERCIAL

## 2025-08-22 ENCOUNTER — OFFICE VISIT (OUTPATIENT)
Dept: PRIMARY CARE | Facility: CLINIC | Age: 75
End: 2025-08-22
Payer: COMMERCIAL

## 2025-08-22 VITALS
HEART RATE: 74 BPM | TEMPERATURE: 97.8 F | WEIGHT: 128 LBS | SYSTOLIC BLOOD PRESSURE: 136 MMHG | BODY MASS INDEX: 26.87 KG/M2 | OXYGEN SATURATION: 96 % | DIASTOLIC BLOOD PRESSURE: 68 MMHG | HEIGHT: 58 IN

## 2025-08-22 DIAGNOSIS — R05.2 SUBACUTE COUGH: Primary | ICD-10-CM

## 2025-08-22 DIAGNOSIS — R52 GENERALIZED BODY ACHES: ICD-10-CM

## 2025-08-22 DIAGNOSIS — R09.82 POST-NASAL DRIP: ICD-10-CM

## 2025-08-22 PROCEDURE — 1159F MED LIST DOCD IN RCRD: CPT | Performed by: INTERNAL MEDICINE

## 2025-08-22 PROCEDURE — G2211 COMPLEX E/M VISIT ADD ON: HCPCS | Performed by: INTERNAL MEDICINE

## 2025-08-22 PROCEDURE — 3075F SYST BP GE 130 - 139MM HG: CPT | Performed by: INTERNAL MEDICINE

## 2025-08-22 PROCEDURE — 99214 OFFICE O/P EST MOD 30 MIN: CPT | Performed by: INTERNAL MEDICINE

## 2025-08-22 PROCEDURE — 1036F TOBACCO NON-USER: CPT | Performed by: INTERNAL MEDICINE

## 2025-08-22 PROCEDURE — 3078F DIAST BP <80 MM HG: CPT | Performed by: INTERNAL MEDICINE

## 2025-08-22 RX ORDER — FLUTICASONE PROPIONATE 50 MCG
1 SPRAY, SUSPENSION (ML) NASAL DAILY
Qty: 16 G | Refills: 2 | Status: SHIPPED | OUTPATIENT
Start: 2025-08-22 | End: 2026-08-22

## 2025-08-22 ASSESSMENT — COLUMBIA-SUICIDE SEVERITY RATING SCALE - C-SSRS
1. IN THE PAST MONTH, HAVE YOU WISHED YOU WERE DEAD OR WISHED YOU COULD GO TO SLEEP AND NOT WAKE UP?: NO
6. HAVE YOU EVER DONE ANYTHING, STARTED TO DO ANYTHING, OR PREPARED TO DO ANYTHING TO END YOUR LIFE?: NO
2. HAVE YOU ACTUALLY HAD ANY THOUGHTS OF KILLING YOURSELF?: NO

## 2025-08-22 ASSESSMENT — ENCOUNTER SYMPTOMS
FATIGUE: 1
EYES NEGATIVE: 1
COUGH: 1
ARTHRALGIAS: 1
CARDIOVASCULAR NEGATIVE: 1
PSYCHIATRIC NEGATIVE: 1
ENDOCRINE NEGATIVE: 1
WHEEZING: 0
HEMATOLOGIC/LYMPHATIC NEGATIVE: 1

## 2025-08-23 LAB
ALBUMIN SERPL-MCNC: 4.5 G/DL (ref 3.6–5.1)
ALP SERPL-CCNC: 72 U/L (ref 37–153)
ALT SERPL-CCNC: 17 U/L (ref 6–29)
ANION GAP SERPL CALCULATED.4IONS-SCNC: 9 MMOL/L (CALC) (ref 7–17)
AST SERPL-CCNC: 17 U/L (ref 10–35)
BASOPHILS # BLD AUTO: 73 CELLS/UL (ref 0–200)
BASOPHILS NFR BLD AUTO: 1 %
BILIRUB SERPL-MCNC: 0.3 MG/DL (ref 0.2–1.2)
BUN SERPL-MCNC: 33 MG/DL (ref 7–25)
CALCIUM SERPL-MCNC: 9.6 MG/DL (ref 8.6–10.4)
CHLORIDE SERPL-SCNC: 106 MMOL/L (ref 98–110)
CO2 SERPL-SCNC: 27 MMOL/L (ref 20–32)
CREAT SERPL-MCNC: 1.12 MG/DL (ref 0.6–1)
CRP SERPL-MCNC: NORMAL MG/L
EGFRCR SERPLBLD CKD-EPI 2021: 51 ML/MIN/1.73M2
EOSINOPHIL # BLD AUTO: 153 CELLS/UL (ref 15–500)
EOSINOPHIL NFR BLD AUTO: 2.1 %
ERYTHROCYTE [DISTWIDTH] IN BLOOD BY AUTOMATED COUNT: 13.5 % (ref 11–15)
ERYTHROCYTE [SEDIMENTATION RATE] IN BLOOD BY WESTERGREN METHOD: 28 MM/H
GLUCOSE SERPL-MCNC: 96 MG/DL (ref 65–99)
HCT VFR BLD AUTO: 39 % (ref 35–45)
HGB BLD-MCNC: 12.7 G/DL (ref 11.7–15.5)
LYMPHOCYTES # BLD AUTO: 2716 CELLS/UL (ref 850–3900)
LYMPHOCYTES NFR BLD AUTO: 37.2 %
MCH RBC QN AUTO: 28.9 PG (ref 27–33)
MCHC RBC AUTO-ENTMCNC: 32.6 G/DL (ref 32–36)
MCV RBC AUTO: 88.6 FL (ref 80–100)
MONOCYTES # BLD AUTO: 752 CELLS/UL (ref 200–950)
MONOCYTES NFR BLD AUTO: 10.3 %
NEUTROPHILS # BLD AUTO: 3606 CELLS/UL (ref 1500–7800)
NEUTROPHILS NFR BLD AUTO: 49.4 %
PLATELET # BLD AUTO: 299 THOUSAND/UL (ref 140–400)
PMV BLD REES-ECKER: 9.5 FL (ref 7.5–12.5)
POTASSIUM SERPL-SCNC: 4.6 MMOL/L (ref 3.5–5.3)
PROT SERPL-MCNC: 7.8 G/DL (ref 6.1–8.1)
RBC # BLD AUTO: 4.4 MILLION/UL (ref 3.8–5.1)
SODIUM SERPL-SCNC: 142 MMOL/L (ref 135–146)
WBC # BLD AUTO: 7.3 THOUSAND/UL (ref 3.8–10.8)

## 2025-08-25 LAB
ALBUMIN SERPL-MCNC: 4.5 G/DL (ref 3.6–5.1)
ALP SERPL-CCNC: 72 U/L (ref 37–153)
ALT SERPL-CCNC: 17 U/L (ref 6–29)
ANION GAP SERPL CALCULATED.4IONS-SCNC: 9 MMOL/L (CALC) (ref 7–17)
AST SERPL-CCNC: 17 U/L (ref 10–35)
BASOPHILS # BLD AUTO: 73 CELLS/UL (ref 0–200)
BASOPHILS NFR BLD AUTO: 1 %
BILIRUB SERPL-MCNC: 0.3 MG/DL (ref 0.2–1.2)
BUN SERPL-MCNC: 33 MG/DL (ref 7–25)
CALCIUM SERPL-MCNC: 9.6 MG/DL (ref 8.6–10.4)
CHLORIDE SERPL-SCNC: 106 MMOL/L (ref 98–110)
CO2 SERPL-SCNC: 27 MMOL/L (ref 20–32)
CREAT SERPL-MCNC: 1.12 MG/DL (ref 0.6–1)
CRP SERPL-MCNC: 3.5 MG/L
EGFRCR SERPLBLD CKD-EPI 2021: 51 ML/MIN/1.73M2
EOSINOPHIL # BLD AUTO: 153 CELLS/UL (ref 15–500)
EOSINOPHIL NFR BLD AUTO: 2.1 %
ERYTHROCYTE [DISTWIDTH] IN BLOOD BY AUTOMATED COUNT: 13.5 % (ref 11–15)
ERYTHROCYTE [SEDIMENTATION RATE] IN BLOOD BY WESTERGREN METHOD: 28 MM/H
GLUCOSE SERPL-MCNC: 96 MG/DL (ref 65–99)
HCT VFR BLD AUTO: 39 % (ref 35–45)
HGB BLD-MCNC: 12.7 G/DL (ref 11.7–15.5)
LYMPHOCYTES # BLD AUTO: 2716 CELLS/UL (ref 850–3900)
LYMPHOCYTES NFR BLD AUTO: 37.2 %
MCH RBC QN AUTO: 28.9 PG (ref 27–33)
MCHC RBC AUTO-ENTMCNC: 32.6 G/DL (ref 32–36)
MCV RBC AUTO: 88.6 FL (ref 80–100)
MONOCYTES # BLD AUTO: 752 CELLS/UL (ref 200–950)
MONOCYTES NFR BLD AUTO: 10.3 %
NEUTROPHILS # BLD AUTO: 3606 CELLS/UL (ref 1500–7800)
NEUTROPHILS NFR BLD AUTO: 49.4 %
PLATELET # BLD AUTO: 299 THOUSAND/UL (ref 140–400)
PMV BLD REES-ECKER: 9.5 FL (ref 7.5–12.5)
POTASSIUM SERPL-SCNC: 4.6 MMOL/L (ref 3.5–5.3)
PROT SERPL-MCNC: 7.8 G/DL (ref 6.1–8.1)
RBC # BLD AUTO: 4.4 MILLION/UL (ref 3.8–5.1)
SODIUM SERPL-SCNC: 142 MMOL/L (ref 135–146)
WBC # BLD AUTO: 7.3 THOUSAND/UL (ref 3.8–10.8)

## 2025-08-27 ENCOUNTER — HOSPITAL ENCOUNTER (OUTPATIENT)
Dept: RADIOLOGY | Facility: HOSPITAL | Age: 75
Discharge: HOME | End: 2025-08-27
Payer: COMMERCIAL

## 2025-08-27 DIAGNOSIS — R05.2 SUBACUTE COUGH: ICD-10-CM

## 2025-08-27 PROCEDURE — 71046 X-RAY EXAM CHEST 2 VIEWS: CPT | Performed by: RADIOLOGY

## 2025-08-27 PROCEDURE — 71046 X-RAY EXAM CHEST 2 VIEWS: CPT

## 2025-10-03 ENCOUNTER — APPOINTMENT (OUTPATIENT)
Dept: PRIMARY CARE | Facility: CLINIC | Age: 75
End: 2025-10-03
Payer: COMMERCIAL

## 2026-03-17 ENCOUNTER — APPOINTMENT (OUTPATIENT)
Dept: CARDIOLOGY | Facility: CLINIC | Age: 76
End: 2026-03-17
Payer: COMMERCIAL

## (undated) DEVICE — SHEATH, PINNACLE, W/.038 GUIDEWIRE, 10 CM,  6FR INTRODUCER, 6FR DIA, 2.5 CM DIALATOR

## (undated) DEVICE — TUBING, MANIFOLD, LOW PRESSURE

## (undated) DEVICE — Device

## (undated) DEVICE — GLIDEWIRE, STIFF SHAFT, ANGLE TIP, .035 DIA, 180 CM,  3 CM TIP"

## (undated) DEVICE — GUIDEWIRE, AMPLATZER, 0.035 X 1.5MM X 260CM, SUPER STIFF

## (undated) DEVICE — ACCESS KIT, S-MAK MINI, 5FR 10CM 0.018IN 40CM, SS/SS, ECHO ENHANCE NEEDLE

## (undated) DEVICE — CATHETER, ACUNAV ULTRASOUND, 8FR 90CM, GE

## (undated) DEVICE — CATHETER, ANGIO, INFINITI, MPA2, 5 FR X 100 CM

## (undated) DEVICE — SHEATH, PINNACLE, W/.038 GUIDEWIRE, 10 CM,  9FR INTRODUCER, 9FR DIA, 2.5 CM DIALATOR

## (undated) DEVICE — BANDAGE, QUIKCLOT, INTERVENTIONAL HEMO, W/O SLIT